# Patient Record
Sex: MALE | Race: WHITE | NOT HISPANIC OR LATINO | Employment: FULL TIME | URBAN - METROPOLITAN AREA
[De-identification: names, ages, dates, MRNs, and addresses within clinical notes are randomized per-mention and may not be internally consistent; named-entity substitution may affect disease eponyms.]

---

## 2021-06-16 PROBLEM — R10.9 ABDOMINAL PAIN: Status: ACTIVE | Noted: 2017-09-08

## 2024-01-03 ENCOUNTER — TRANSFERRED RECORDS (OUTPATIENT)
Dept: HEALTH INFORMATION MANAGEMENT | Facility: CLINIC | Age: 39
End: 2024-01-03

## 2024-09-23 ENCOUNTER — HOSPITAL ENCOUNTER (EMERGENCY)
Facility: CLINIC | Age: 39
Discharge: HOME | End: 2024-09-24
Attending: EMERGENCY MEDICINE | Admitting: EMERGENCY MEDICINE

## 2024-09-23 DIAGNOSIS — R11.2 NAUSEA AND VOMITING, UNSPECIFIED VOMITING TYPE: ICD-10-CM

## 2024-09-23 DIAGNOSIS — Z87.19 HISTORY OF CROHN'S DISEASE: ICD-10-CM

## 2024-09-23 DIAGNOSIS — R10.9 ABDOMINAL PAIN, UNSPECIFIED ABDOMINAL LOCATION: ICD-10-CM

## 2024-09-23 DIAGNOSIS — R45.851 PASSIVE SUICIDAL IDEATIONS: ICD-10-CM

## 2024-09-23 LAB
ALBUMIN SERPL BCG-MCNC: 4.2 G/DL (ref 3.5–5.2)
ALP SERPL-CCNC: 83 U/L (ref 40–150)
ALT SERPL W P-5'-P-CCNC: 11 U/L (ref 0–70)
ANION GAP SERPL CALCULATED.3IONS-SCNC: 15 MMOL/L (ref 7–15)
AST SERPL W P-5'-P-CCNC: 18 U/L (ref 0–45)
BASOPHILS # BLD AUTO: 0 10E3/UL (ref 0–0.2)
BASOPHILS NFR BLD AUTO: 0 %
BILIRUB SERPL-MCNC: 0.7 MG/DL
BUN SERPL-MCNC: 14.2 MG/DL (ref 6–20)
CALCIUM SERPL-MCNC: 9.3 MG/DL (ref 8.8–10.4)
CHLORIDE SERPL-SCNC: 104 MMOL/L (ref 98–107)
CREAT SERPL-MCNC: 1 MG/DL (ref 0.67–1.17)
EGFRCR SERPLBLD CKD-EPI 2021: >90 ML/MIN/1.73M2
EOSINOPHIL # BLD AUTO: 0 10E3/UL (ref 0–0.7)
EOSINOPHIL NFR BLD AUTO: 0 %
ERYTHROCYTE [DISTWIDTH] IN BLOOD BY AUTOMATED COUNT: 18.4 % (ref 10–15)
GLUCOSE SERPL-MCNC: 126 MG/DL (ref 70–99)
HCO3 SERPL-SCNC: 21 MMOL/L (ref 22–29)
HCT VFR BLD AUTO: 34.3 % (ref 40–53)
HGB BLD-MCNC: 10.3 G/DL (ref 13.3–17.7)
HOLD SPECIMEN: NORMAL
HOLD SPECIMEN: NORMAL
IMM GRANULOCYTES # BLD: 0 10E3/UL
IMM GRANULOCYTES NFR BLD: 0 %
LACTATE SERPL-SCNC: 1.3 MMOL/L (ref 0.7–2)
LYMPHOCYTES # BLD AUTO: 0.3 10E3/UL (ref 0.8–5.3)
LYMPHOCYTES NFR BLD AUTO: 2 %
MCH RBC QN AUTO: 21.8 PG (ref 26.5–33)
MCHC RBC AUTO-ENTMCNC: 30 G/DL (ref 31.5–36.5)
MCV RBC AUTO: 73 FL (ref 78–100)
MONOCYTES # BLD AUTO: 0.7 10E3/UL (ref 0–1.3)
MONOCYTES NFR BLD AUTO: 5 %
NEUTROPHILS # BLD AUTO: 12.1 10E3/UL (ref 1.6–8.3)
NEUTROPHILS NFR BLD AUTO: 92 %
NRBC # BLD AUTO: 0 10E3/UL
NRBC BLD AUTO-RTO: 0 /100
PLATELET # BLD AUTO: 476 10E3/UL (ref 150–450)
POTASSIUM SERPL-SCNC: 3.9 MMOL/L (ref 3.4–5.3)
PROT SERPL-MCNC: 7.5 G/DL (ref 6.4–8.3)
RBC # BLD AUTO: 4.72 10E6/UL (ref 4.4–5.9)
SODIUM SERPL-SCNC: 140 MMOL/L (ref 135–145)
WBC # BLD AUTO: 13.1 10E3/UL (ref 4–11)

## 2024-09-23 PROCEDURE — 85025 COMPLETE CBC W/AUTO DIFF WBC: CPT | Performed by: EMERGENCY MEDICINE

## 2024-09-23 PROCEDURE — 96361 HYDRATE IV INFUSION ADD-ON: CPT

## 2024-09-23 PROCEDURE — 83605 ASSAY OF LACTIC ACID: CPT | Performed by: STUDENT IN AN ORGANIZED HEALTH CARE EDUCATION/TRAINING PROGRAM

## 2024-09-23 PROCEDURE — 36415 COLL VENOUS BLD VENIPUNCTURE: CPT | Performed by: STUDENT IN AN ORGANIZED HEALTH CARE EDUCATION/TRAINING PROGRAM

## 2024-09-23 PROCEDURE — 36415 COLL VENOUS BLD VENIPUNCTURE: CPT | Performed by: EMERGENCY MEDICINE

## 2024-09-23 PROCEDURE — 84460 ALANINE AMINO (ALT) (SGPT): CPT | Performed by: EMERGENCY MEDICINE

## 2024-09-23 PROCEDURE — 99285 EMERGENCY DEPT VISIT HI MDM: CPT | Mod: 25

## 2024-09-23 PROCEDURE — 84155 ASSAY OF PROTEIN SERUM: CPT | Performed by: STUDENT IN AN ORGANIZED HEALTH CARE EDUCATION/TRAINING PROGRAM

## 2024-09-23 PROCEDURE — 83605 ASSAY OF LACTIC ACID: CPT | Performed by: EMERGENCY MEDICINE

## 2024-09-23 PROCEDURE — 250N000011 HC RX IP 250 OP 636: Performed by: STUDENT IN AN ORGANIZED HEALTH CARE EDUCATION/TRAINING PROGRAM

## 2024-09-23 PROCEDURE — 96374 THER/PROPH/DIAG INJ IV PUSH: CPT | Mod: 59

## 2024-09-23 PROCEDURE — 85025 COMPLETE CBC W/AUTO DIFF WBC: CPT | Performed by: STUDENT IN AN ORGANIZED HEALTH CARE EDUCATION/TRAINING PROGRAM

## 2024-09-23 RX ORDER — ONDANSETRON 4 MG/1
4 TABLET, ORALLY DISINTEGRATING ORAL ONCE
Status: COMPLETED | OUTPATIENT
Start: 2024-09-23 | End: 2024-09-23

## 2024-09-23 RX ADMIN — ONDANSETRON 4 MG: 4 TABLET, ORALLY DISINTEGRATING ORAL at 21:27

## 2024-09-23 ASSESSMENT — COLUMBIA-SUICIDE SEVERITY RATING SCALE - C-SSRS
2. HAVE YOU ACTUALLY HAD ANY THOUGHTS OF KILLING YOURSELF IN THE PAST MONTH?: YES
3. HAVE YOU BEEN THINKING ABOUT HOW YOU MIGHT KILL YOURSELF?: NO
5. HAVE YOU STARTED TO WORK OUT OR WORKED OUT THE DETAILS OF HOW TO KILL YOURSELF? DO YOU INTEND TO CARRY OUT THIS PLAN?: NO
4. HAVE YOU HAD THESE THOUGHTS AND HAD SOME INTENTION OF ACTING ON THEM?: YES
6. HAVE YOU EVER DONE ANYTHING, STARTED TO DO ANYTHING, OR PREPARED TO DO ANYTHING TO END YOUR LIFE?: YES
1. IN THE PAST MONTH, HAVE YOU WISHED YOU WERE DEAD OR WISHED YOU COULD GO TO SLEEP AND NOT WAKE UP?: YES

## 2024-09-24 ENCOUNTER — APPOINTMENT (OUTPATIENT)
Dept: CT IMAGING | Facility: CLINIC | Age: 39
End: 2024-09-24
Attending: EMERGENCY MEDICINE

## 2024-09-24 VITALS
BODY MASS INDEX: 19.62 KG/M2 | OXYGEN SATURATION: 99 % | RESPIRATION RATE: 20 BRPM | HEIGHT: 67 IN | SYSTOLIC BLOOD PRESSURE: 94 MMHG | HEART RATE: 55 BPM | TEMPERATURE: 98.8 F | WEIGHT: 125 LBS | DIASTOLIC BLOOD PRESSURE: 59 MMHG

## 2024-09-24 LAB
ALBUMIN SERPL BCG-MCNC: 4.3 G/DL (ref 3.5–5.2)
ALP SERPL-CCNC: 83 U/L (ref 40–150)
ALT SERPL W P-5'-P-CCNC: 10 U/L (ref 0–70)
ANION GAP SERPL CALCULATED.3IONS-SCNC: 15 MMOL/L (ref 7–15)
AST SERPL W P-5'-P-CCNC: 19 U/L (ref 0–45)
BASOPHILS # BLD AUTO: 0 10E3/UL (ref 0–0.2)
BASOPHILS NFR BLD AUTO: 0 %
BILIRUB SERPL-MCNC: 0.7 MG/DL
BUN SERPL-MCNC: 15.1 MG/DL (ref 6–20)
CALCIUM SERPL-MCNC: 9.4 MG/DL (ref 8.8–10.4)
CHLORIDE SERPL-SCNC: 104 MMOL/L (ref 98–107)
CREAT SERPL-MCNC: 1.05 MG/DL (ref 0.67–1.17)
EGFRCR SERPLBLD CKD-EPI 2021: >90 ML/MIN/1.73M2
EOSINOPHIL # BLD AUTO: 0 10E3/UL (ref 0–0.7)
EOSINOPHIL NFR BLD AUTO: 0 %
ERYTHROCYTE [DISTWIDTH] IN BLOOD BY AUTOMATED COUNT: 18.9 % (ref 10–15)
GLUCOSE SERPL-MCNC: 109 MG/DL (ref 70–99)
HCO3 SERPL-SCNC: 23 MMOL/L (ref 22–29)
HCT VFR BLD AUTO: 33.9 % (ref 40–53)
HGB BLD-MCNC: 10.3 G/DL (ref 13.3–17.7)
HOLD SPECIMEN: NORMAL
IMM GRANULOCYTES # BLD: 0.1 10E3/UL
IMM GRANULOCYTES NFR BLD: 1 %
LYMPHOCYTES # BLD AUTO: 0.4 10E3/UL (ref 0.8–5.3)
LYMPHOCYTES NFR BLD AUTO: 3 %
MCH RBC QN AUTO: 22 PG (ref 26.5–33)
MCHC RBC AUTO-ENTMCNC: 30.4 G/DL (ref 31.5–36.5)
MCV RBC AUTO: 72 FL (ref 78–100)
MONOCYTES # BLD AUTO: 0.7 10E3/UL (ref 0–1.3)
MONOCYTES NFR BLD AUTO: 5 %
NEUTROPHILS # BLD AUTO: 12.9 10E3/UL (ref 1.6–8.3)
NEUTROPHILS NFR BLD AUTO: 91 %
NRBC # BLD AUTO: 0 10E3/UL
NRBC BLD AUTO-RTO: 0 /100
PLATELET # BLD AUTO: 462 10E3/UL (ref 150–450)
POTASSIUM SERPL-SCNC: 3.7 MMOL/L (ref 3.4–5.3)
PROT SERPL-MCNC: 7.6 G/DL (ref 6.4–8.3)
RBC # BLD AUTO: 4.68 10E6/UL (ref 4.4–5.9)
SODIUM SERPL-SCNC: 142 MMOL/L (ref 135–145)
WBC # BLD AUTO: 14.1 10E3/UL (ref 4–11)

## 2024-09-24 PROCEDURE — 250N000011 HC RX IP 250 OP 636: Performed by: EMERGENCY MEDICINE

## 2024-09-24 PROCEDURE — 74177 CT ABD & PELVIS W/CONTRAST: CPT

## 2024-09-24 PROCEDURE — 258N000003 HC RX IP 258 OP 636: Performed by: EMERGENCY MEDICINE

## 2024-09-24 PROCEDURE — 250N000009 HC RX 250: Performed by: EMERGENCY MEDICINE

## 2024-09-24 RX ORDER — HYDROMORPHONE HYDROCHLORIDE 1 MG/ML
0.5 INJECTION, SOLUTION INTRAMUSCULAR; INTRAVENOUS; SUBCUTANEOUS ONCE
Status: COMPLETED | OUTPATIENT
Start: 2024-09-24 | End: 2024-09-24

## 2024-09-24 RX ORDER — IOPAMIDOL 755 MG/ML
500 INJECTION, SOLUTION INTRAVASCULAR ONCE
Status: COMPLETED | OUTPATIENT
Start: 2024-09-24 | End: 2024-09-24

## 2024-09-24 RX ORDER — ONDANSETRON 4 MG/1
4 TABLET, ORALLY DISINTEGRATING ORAL EVERY 8 HOURS PRN
Qty: 4 TABLET | Refills: 0 | Status: SHIPPED | OUTPATIENT
Start: 2024-09-24

## 2024-09-24 RX ORDER — ONDANSETRON 2 MG/ML
4 INJECTION INTRAMUSCULAR; INTRAVENOUS ONCE
Status: COMPLETED | OUTPATIENT
Start: 2024-09-24 | End: 2024-09-24

## 2024-09-24 RX ADMIN — ONDANSETRON 4 MG: 2 INJECTION INTRAMUSCULAR; INTRAVENOUS at 02:08

## 2024-09-24 RX ADMIN — SODIUM CHLORIDE 56 ML: 9 INJECTION, SOLUTION INTRAVENOUS at 01:11

## 2024-09-24 RX ADMIN — HYDROMORPHONE HYDROCHLORIDE 0.5 MG: 1 INJECTION, SOLUTION INTRAMUSCULAR; INTRAVENOUS; SUBCUTANEOUS at 02:09

## 2024-09-24 RX ADMIN — SODIUM CHLORIDE 1000 ML: 9 INJECTION, SOLUTION INTRAVENOUS at 02:08

## 2024-09-24 RX ADMIN — IOPAMIDOL 63 ML: 755 INJECTION, SOLUTION INTRAVENOUS at 01:11

## 2024-09-24 ASSESSMENT — ACTIVITIES OF DAILY LIVING (ADL)
ADLS_ACUITY_SCORE: 35

## 2024-09-24 NOTE — DISCHARGE INSTRUCTIONS
Please monitor symptoms closely.  Continue with a bland diet.    Continue to follow-up with gastroenterology as you have discussed.    You may use Zofran as needed for nausea or vomiting.    Follow-up with your outpatient therapy and psychiatry appointments.    Return to the ER if you develop worsening nausea, vomiting, decreased oral intake, or develop any concerns regarding your own safety.    Discharge Instructions  Abdominal Pain    Abdominal pain (belly pain) can be caused by many things. Your evaluation today does not show the exact cause for your pain. Your provider today has decided that it is unlikely your pain is due to a life threatening problem, or a problem requiring surgery or hospital admission. Sometimes those problems cannot be found right away, so it is very important that you follow up as directed.  Sometimes only the changes which occur over time allow the cause of your pain to be found.    Generally, every Emergency Department visit should have a follow-up clinic visit with either a primary or a specialty clinic/provider. Please follow-up as instructed by your emergency provider today. With abdominal pain, we often recommend very close follow-up, such as the following day.    ADULTS:  Return to the Emergency Department right away if:    You get an oral temperature above 102oF or as directed by your provider.  You have blood in your stools. This may be bright red or appear as black, tarry stools.    You keep vomiting (throwing up) or cannot drink liquids.  You see blood when you vomit.   You cannot have a bowel movement or you cannot pass gas.  Your stomach gets bloated or bigger.  Your skin or the whites of your eyes look yellow.  You faint.  You have bloody, frequent or painful urination (peeing).  You have new symptoms or anything that worries you.    CHILDREN:  Return to the Emergency Department right away if your child has any of the above-listed symptoms or the following:    Pushes your  hand away or screams/cries when his/her belly is touched.  You notice your child is very fussy or weak.  Your child is very tired and is too tired to eat or drink.  Your child is dehydrated.  Signs of dehydration can be:  Significant change in the amount of wet diapers/urine.  Your infant or child starts to have dry mouth and lips, or no saliva (spit) or tears.    PREGNANT WOMEN:  Return to the Emergency Department right away if you have any of the above-listed symptoms or the following:    You have bleeding, leaking fluid or passing tissue from the vagina.  You have worse pain or cramping, or pain in your shoulder or back.  You have vomiting that will not stop.  You have a temperature of 100oF or more.  Your baby is not moving as much as usual.  You faint.  You get a bad headache with or without eye problems and abdominal pain.  You have a seizure.  You have unusual discharge from your vagina and abdominal pain.    Abdominal pain is pretty common during pregnancy.  Your pain may or may not be related to your pregnancy. You should follow-up closely with your OB provider so they can evaluate you and your baby.  Until you follow-up with your regular provider, do the following:     Avoid sex and do not put anything in your vagina.  Drink clear fluids.  Only take medications approved by your provider.    MORE INFORMATION:    Appendicitis:  A possible cause of abdominal pain in any person who still has their appendix is acute appendicitis. Appendicitis is often hard to diagnose.  Testing does not always rule out early appendicitis or other causes of abdominal pain. Close follow-up with your provider and re-evaluations may be needed to figure out the reason for your abdominal pain.    Follow-up:  It is very important that you make an appointment with your clinic and go to the appointment.  If you do not follow-up with your primary provider, it may result in missing an important development which could result in permanent  "injury or disability and/or lasting pain.  If there is any problem keeping your appointment, call your provider or return to the Emergency Department.    Medications:  Take your medications as directed by your provider today.  Before using over-the-counter medications, ask your provider and make sure to take the medications as directed.  If you have any questions about medications, ask your provider.    Diet:  Resume your normal diet as much as possible, but do not eat fried, fatty or spicy foods while you have pain.  Do not drink alcohol or have caffeine.  Do not smoke tobacco.    Probiotics: If you have been given an antibiotic, you may want to also take a probiotic pill or eat yogurt with live cultures. Probiotics have \"good bacteria\" to help your intestines stay healthy. Studies have shown that probiotics help prevent diarrhea (loose stools) and other intestine problems (including C. diff infection) when you take antibiotics. You can buy these without a prescription in the pharmacy section of the store.     If you were given a prescription for medicine here today, be sure to read all of the information (including the package insert) that comes with your prescription.  This will include important information about the medicine, its side effects, and any warnings that you need to know about.  The pharmacist who fills the prescription can provide more information and answer questions you may have about the medicine.  If you have questions or concerns that the pharmacist cannot address, please call or return to the Emergency Department.       Remember that you can always come back to the Emergency Department if you are not able to see your regular provider in the amount of time listed above, if you get any new symptoms, or if there is anything that worries you.  Discharge Instructions  Mental Health Concerns    You were seen today for mental health concerns, such as depression, anxiety, or suicidal thinking. Your " provider feels that you do not require hospitalization at this time. However, your symptoms may become worse, and you may need to return to the Emergency Department. Most treatments of depression and suicidal thoughts are a process rather than a single intervention.  Medications and counseling can take several weeks or more to help.    Generally, every Emergency Department visit should have a follow-up clinic visit with either a primary or a specialty clinic/provider. Please follow-up as instructed by your emergency provider today.    By accepting these discharge instructions:  You promise to not harm yourself or others.  You agree that if you feel you are becoming unable to keep that promise, you will do something to help yourself before you do anything to harm yourself or others.   You agree to keep any safety plan arranged on your visit here today.  You agree to take any medication prescribed or recommended by your provider.  If you are getting worse, you can contact a friend or a family member, contact your counselor or family provider, contact a crisis line, or other options discussed with the provider or therapist today.  At any time, you can call 911 and return to the Emergency Department for more help.  You understand that follow-up is essential to your treatment, and you will make and keep appointments recommended on your visit today.    How to improve your mental health and prevent suicide:  Involve others by letting family, friends, counselors know.  Do not isolate yourself.  Avoid alcohol or drugs. Remove weapons, poisons from your home.  Try to stick to routines for eating, sleeping and getting regular exercise.    Try to get into sunlight. Bright natural light not only treats seasonal affective disorder but also depression.  Increase safe activities that you enjoy.    If you feel worse, contact [phone] ([phone]), or call 911, or your primary provider/counselor for additional assistance.     If you were given a prescription for medicine here today, be sure to read all of the information (including the package insert) that comes with your prescription.  This will include important information about the medicine, its side effects, and any warnings that you need to know about.  The pharmacist who fills the prescription can provide more information and answer questions you may have about the medicine.  If you have questions or concerns that the pharmacist cannot address, please call or return to the Emergency Department.   Remember that you can always come back to the Emergency Department if you are not able to see your regular provider in the amount of time listed above, if you get any new symptoms, or if there is anything that worries you.

## 2024-09-24 NOTE — ED PROVIDER NOTES
"  Emergency Department Note      History of Present Illness     Chief Complaint  Abdominal Pain    HPI  Joseph R Barthel is a 38 year old male with history of Crohn's disease who presents to the ED with his mother for evaluation of abdominal pain. He reports walking up this morning at 0525 with severe middle abdominal pain and cramps along with multiple episodes of vomiting.  He attempted to go to work, though given the persistence of his symptoms, was ultimately sent home.  Upon arriving home, he continued to experience nausea and vomiting, including an episode about every 20 minutes.  The symptoms make him feel quite dehydrated, weak, with fatigue, lack of appetite, and myalgias throughout his body.  He notes that during this time, he had been feeling of helplessness, noting thoughts of \"I do not want to do this anymore.\"  He acknowledges the thoughts were out of frustration that he could not adequately control his symptoms and was not sure what to do next.  Patient does not knowledge multiple episodes of flares similar to this in the years past, though it has been over 1 year since he has had a bout.  He denies any clear provoking factors including known sick contacts or recent antibiotic use.  Of note, patient had previously been residing in [place], before recently returning to [place] where he has been living lately.  He is in the process of establishing care with [facility] for his Crohn's disease.  Past surgical history significant for colectomy.  Current pain is rated at 6/10 on the pain scale.  He denies any associated diarrhea, nor hematemesis.  He has not had significant bowel movements throughout the day, and also acknowledges not eating much throughout the day.      Independent Historian  None    Review of External Notes  I reviewed a psychiatry visit note from 9/12/2024, where patient is followed for medication management.    I reviewed family medicine visit note from 9/12/2024, where I " "learned patient is on Truvada for high risk sexual behavior.    I reviewed a telemedicine psychiatry visit note from 9/5/2024, where patient does have a history of anxiety and panic attacks, oftentimes getting worked up because he continues to experience nightly vomiting.  Past Medical History   Medical History and Problem List  Crohn's disease   Panic attacks  Chronic anemia   Ulcerative colitis    Medications  Sertraline  Clonazepam  Zofran   Trazodone  Truvada  Minoxidil    Surgical History   Colectomy  Small intestine surgery  Anal fistulotomy   Physical Exam   Patient Vitals for the past 24 hrs:   BP Temp Temp src Pulse Resp SpO2 Height Weight   09/24/24 0259 94/59 -- -- 55 -- 99 % -- --   09/24/24 0230 95/61 -- -- 52 -- 99 % -- --   09/24/24 0200 95/58 -- -- 53 -- 99 % -- --   09/24/24 0150 99/63 -- -- -- -- 98 % -- --   09/24/24 0126 91/57 -- -- -- -- 90 % -- --   09/24/24 0106 -- -- -- -- -- 98 % -- --   09/24/24 0056 93/61 -- -- -- -- 97 % -- --   09/24/24 0002 102/63 -- -- 60 20 97 % -- --   09/23/24 2122 119/76 98.8  F (37.1  C) Oral 91 24 98 % 1.702 m (5' 7\") 56.7 kg (125 lb)     Physical Exam  General:              Well-nourished              Speaking in full sentences  Eyes:              Conjunctiva without injection or scleral icterus  ENT:              Moist mucous membranes              Nares patent              Pinnae normal  Neck:              Full ROM              No stiffness appreciated  Resp:              Lungs CTAB              No crackles, wheezing or audible rubs              Good air movement  CV:                    Normal rate, regular rhythm              S1 and S2 present              No murmur, gallop or rub  GI:              BS present              Abdomen soft without distention              Diffuse tenderness throughout              No palpable mass              No guarding or rebound tenderness  Skin:              Warm, dry, well perfused              No rashes or open wounds on " exposed skin  MSK:              Moves all extremities              No focal deformities or swelling  Neuro:              Alert              Answers questions appropriately              Moves all extremities equally              Gait stable  Psych:              Normal affect, normal mood              Denies suicidal plan    Diagnostics   Lab Results   Labs Ordered and Resulted from Time of ED Arrival to Time of ED Departure   COMPREHENSIVE METABOLIC PANEL - Abnormal       Result Value    Sodium 140      Potassium 3.9      Carbon Dioxide (CO2) 21 (*)     Anion Gap 15      Urea Nitrogen 14.2      Creatinine 1.00      GFR Estimate >90      Calcium 9.3      Chloride 104      Glucose 126 (*)     Alkaline Phosphatase 83      AST 18      ALT 11      Protein Total 7.5      Albumin 4.2      Bilirubin Total 0.7     CBC WITH PLATELETS AND DIFFERENTIAL - Abnormal    WBC Count 13.1 (*)     RBC Count 4.72      Hemoglobin 10.3 (*)     Hematocrit 34.3 (*)     MCV 73 (*)     MCH 21.8 (*)     MCHC 30.0 (*)     RDW 18.4 (*)     Platelet Count 476 (*)     % Neutrophils 92      % Lymphocytes 2      % Monocytes 5      % Eosinophils 0      % Basophils 0      % Immature Granulocytes 0      NRBCs per 100 WBC 0      Absolute Neutrophils 12.1 (*)     Absolute Lymphocytes 0.3 (*)     Absolute Monocytes 0.7      Absolute Eosinophils 0.0      Absolute Basophils 0.0      Absolute Immature Granulocytes 0.0      Absolute NRBCs 0.0     COMPREHENSIVE METABOLIC PANEL - Abnormal    Sodium 142      Potassium 3.7      Carbon Dioxide (CO2) 23      Anion Gap 15      Urea Nitrogen 15.1      Creatinine 1.05      GFR Estimate >90      Calcium 9.4      Chloride 104      Glucose 109 (*)     Alkaline Phosphatase 83      AST 19      ALT 10      Protein Total 7.6      Albumin 4.3      Bilirubin Total 0.7     CBC WITH PLATELETS AND DIFFERENTIAL - Abnormal    WBC Count 14.1 (*)     RBC Count 4.68      Hemoglobin 10.3 (*)     Hematocrit 33.9 (*)     MCV 72 (*)     MCH  22.0 (*)     MCHC 30.4 (*)     RDW 18.9 (*)     Platelet Count 462 (*)     % Neutrophils 91      % Lymphocytes 3      % Monocytes 5      % Eosinophils 0      % Basophils 0      % Immature Granulocytes 1      NRBCs per 100 WBC 0      Absolute Neutrophils 12.9 (*)     Absolute Lymphocytes 0.4 (*)     Absolute Monocytes 0.7      Absolute Eosinophils 0.0      Absolute Basophils 0.0      Absolute Immature Granulocytes 0.1      Absolute NRBCs 0.0     LACTIC ACID WHOLE BLOOD - Normal    Lactic Acid 1.3       Imaging  CT Abdomen Pelvis w Contrast   Final Result   IMPRESSION:    1.  No acute findings or inflammatory changes in the abdomen or pelvis.        Report per radiology    Independent Interpretation  None  ED Course    Medications Administered  Medications   ondansetron (ZOFRAN ODT) ODT tab 4 mg (4 mg Oral $Given 9/23/24 2127)   sodium chloride 0.9% BOLUS 1,000 mL (0 mLs Intravenous Stopped 9/24/24 0329)   HYDROmorphone (PF) (DILAUDID) injection 0.5 mg (0.5 mg Intravenous $Given 9/24/24 0209)   ondansetron (ZOFRAN) injection 4 mg (4 mg Intravenous $Given 9/24/24 0208)   iopamidol (ISOVUE-370) solution 500 mL (63 mLs Intravenous $Given 9/24/24 0111)   CT scan flush (56 mLs Intravenous $Given 9/24/24 0111)     Procedures  Procedures     Discussion of Management  None    Optional/Additional Documentation  None    ED Course  ED Course as of 09/24/24 0330   Mon Sep 23, 2024   2355 I obtained history and examined the patient as noted above.    e Sep 24, 2024   0314 I rechecked the patient and explained findings. Patient is no longer suicidal. We discussed being evaluated by DEC in the later morning but patient declined. He reports being well established and sees therapy every Friday. He can be safe at home.    0329 I prepared the patient to be discharged home.      Medical Decision Making / Diagnosis   CMS Diagnoses: None    MIPS     None    [facility]  Joseph Barthel is a 38-year-old male presenting to the ER for evaluation of  "abdominal pain, nausea, and vomiting.  VS on presentation reveal no acute abnormalities.  History obtained from patient as well as review of EMR.  Patient presenting with recurrent nausea and nonbloody emesis.  He was provided IV fluids and symptomatic cares, noting improvements in symptoms.  He was without ongoing vomiting during his ED course.  He does acknowledge a prior history of flares similar to this in the past, though it has been sometime.  History of Crohn's disease, as well as significant vomiting, advanced imaging was pursued.  Fortunately, no evidence of bowel obstruction or other acute intra-abdominal pathology is identified.  His laboratory evaluation does reveal unremarkable metabolic panel including LFTs, as well as mild leukocytosis and thrombocytosis which I suspect to be secondary to recurrent nausea and vomiting.  He does have mild anemia, though has had this in the past and is improved compared with most recent value available for comparison from 2017.  By history, no signs or symptoms of acute blood loss anemia.    From a mental health standpoint, patient did screen positive during the triage suicide screen questionnaire.  When questioned regarding suicidal ideation, patient responded by saying \"yes, I just wanted to stop\", adding that he is \"50/50\" referring to suicidal ideation.  In further discussion with the patient, he denies any active plan or true intent.  After his symptoms were controlled following the above interventions, he is feeling markedly improved and no longer having any thoughts of suicide.  He acknowledges that the stress of his recurrent nausea and vomiting ultimately led to increased levels of anxiety and likely a panic attack, contributing to these feelings.  I did offer for the patient to speak with DEC during his ED course.  Unfortunately, they were quite backed up and would not be available to meet with the patient till 8/10 in the morning.  On reassessment, patient is " wishing to return home and defer on the crisis team assessment.  He notes that he is well-established in the outpatient environment, seeing a therapist every Friday, as well as regular follow-up with a psychiatrist for medication management.  With reasonable clinical certainty I feel this to be a reasonable plan of care.  He does not appear to be an imminent threat to his own safety or the safety of others in the outpatient environment.  He is understanding that he may return to the ER in the meantime with any new or troubling symptoms such as recurrent nausea, vomiting, bloody stool, fevers, increasing abdominal pain or any other concerns.  All of his questions have been answered prior to discharge.  Mother at bedside and in agreement with plan of care.    Disposition  The patient was discharged.     ICD-10 Codes:    ICD-10-CM    1. Abdominal pain, unspecified abdominal location  R10.9       2. History of Crohn's disease  Z87.19       3. Nausea and vomiting, unspecified vomiting type  R11.2       4. Passive suicidal ideations  R45.851            Discharge Medications  New Prescriptions    ONDANSETRON (ZOFRAN ODT) 4 MG ODT TAB    Take 1 tablet (4 mg) by mouth every 8 hours as needed.     Scribe Disclosure:  I, Anna Pedro, am serving as a scribe at 12:11 AM on 9/24/2024 to document services personally performed by Maurilio Bolivar MD based on my observations and the provider's statements to me.      Maurilio Bolivar MD  09/24/24 4216

## 2024-09-24 NOTE — ED TRIAGE NOTES
"Patient arrives complaining of abdominal pain and vomiting. Pt states after the pain the panic attacks all day have been become worse. History of chrohn's and panic attacks. Pt is anxious and tearful in triage. When asked suicidal questions \"yes, I just want it to stop.\" When asked about suicide he said, \"50/50.\" \"I'm tired, thirsty and in pain.\"        "

## 2024-10-30 ENCOUNTER — OFFICE VISIT (OUTPATIENT)
Dept: URGENT CARE | Facility: URGENT CARE | Age: 39
End: 2024-10-30
Payer: COMMERCIAL

## 2024-10-30 ENCOUNTER — HOSPITAL ENCOUNTER (EMERGENCY)
Facility: CLINIC | Age: 39
Discharge: HOME | End: 2024-10-30
Attending: EMERGENCY MEDICINE | Admitting: EMERGENCY MEDICINE
Payer: COMMERCIAL

## 2024-10-30 VITALS
SYSTOLIC BLOOD PRESSURE: 121 MMHG | OXYGEN SATURATION: 95 % | HEIGHT: 66 IN | HEART RATE: 63 BPM | RESPIRATION RATE: 12 BRPM | BODY MASS INDEX: 18.81 KG/M2 | DIASTOLIC BLOOD PRESSURE: 72 MMHG | WEIGHT: 117.06 LBS | TEMPERATURE: 98.5 F

## 2024-10-30 VITALS
RESPIRATION RATE: 20 BRPM | OXYGEN SATURATION: 99 % | HEART RATE: 112 BPM | SYSTOLIC BLOOD PRESSURE: 118 MMHG | BODY MASS INDEX: 18.62 KG/M2 | DIASTOLIC BLOOD PRESSURE: 64 MMHG | HEIGHT: 67 IN | WEIGHT: 118.6 LBS | TEMPERATURE: 98.5 F

## 2024-10-30 DIAGNOSIS — K50.919 CROHN'S DISEASE WITH COMPLICATION, UNSPECIFIED GASTROINTESTINAL TRACT LOCATION (H): ICD-10-CM

## 2024-10-30 DIAGNOSIS — R11.2 NAUSEA AND VOMITING, UNSPECIFIED VOMITING TYPE: ICD-10-CM

## 2024-10-30 DIAGNOSIS — K50.90 ACUTE CROHN'S DISEASE WITHOUT COMPLICATION (H): ICD-10-CM

## 2024-10-30 DIAGNOSIS — F41.9 ANXIETY: ICD-10-CM

## 2024-10-30 DIAGNOSIS — R10.84 GENERALIZED ABDOMINAL PAIN: ICD-10-CM

## 2024-10-30 DIAGNOSIS — R11.10 ACUTE VOMITING: Primary | ICD-10-CM

## 2024-10-30 PROBLEM — F41.1 GENERALIZED ANXIETY DISORDER: Status: ACTIVE | Noted: 2023-11-02

## 2024-10-30 PROBLEM — D64.9 ANEMIA: Status: ACTIVE | Noted: 2017-08-25

## 2024-10-30 PROBLEM — K51.90 ULCERATIVE COLITIS (H): Status: ACTIVE | Noted: 2017-03-19

## 2024-10-30 PROBLEM — K52.9 INFLAMMATORY BOWEL DISEASE: Status: ACTIVE | Noted: 2023-09-07

## 2024-10-30 LAB
ALBUMIN SERPL BCG-MCNC: 4.2 G/DL (ref 3.5–5.2)
ALP SERPL-CCNC: 81 U/L (ref 40–150)
ALT SERPL W P-5'-P-CCNC: 13 U/L (ref 0–70)
ANION GAP SERPL CALCULATED.3IONS-SCNC: 12 MMOL/L (ref 7–15)
AST SERPL W P-5'-P-CCNC: 18 U/L (ref 0–45)
BASOPHILS # BLD AUTO: 0 10E3/UL (ref 0–0.2)
BASOPHILS NFR BLD AUTO: 0 %
BILIRUB SERPL-MCNC: 0.8 MG/DL
BUN SERPL-MCNC: 10.6 MG/DL (ref 6–20)
CALCIUM SERPL-MCNC: 9.3 MG/DL (ref 8.8–10.4)
CHLORIDE SERPL-SCNC: 100 MMOL/L (ref 98–107)
CREAT SERPL-MCNC: 0.98 MG/DL (ref 0.67–1.17)
EGFRCR SERPLBLD CKD-EPI 2021: >90 ML/MIN/1.73M2
EOSINOPHIL # BLD AUTO: 0.5 10E3/UL (ref 0–0.7)
EOSINOPHIL NFR BLD AUTO: 4 %
ERYTHROCYTE [DISTWIDTH] IN BLOOD BY AUTOMATED COUNT: 19.2 % (ref 10–15)
GLUCOSE SERPL-MCNC: 102 MG/DL (ref 70–99)
HCO3 SERPL-SCNC: 25 MMOL/L (ref 22–29)
HCT VFR BLD AUTO: 33.7 % (ref 40–53)
HGB BLD-MCNC: 10.2 G/DL (ref 13.3–17.7)
HOLD SPECIMEN: NORMAL
IMM GRANULOCYTES # BLD: 0.1 10E3/UL
IMM GRANULOCYTES NFR BLD: 1 %
LIPASE SERPL-CCNC: 23 U/L (ref 13–60)
LYMPHOCYTES # BLD AUTO: 0.4 10E3/UL (ref 0.8–5.3)
LYMPHOCYTES NFR BLD AUTO: 3 %
MCH RBC QN AUTO: 22.1 PG (ref 26.5–33)
MCHC RBC AUTO-ENTMCNC: 30.3 G/DL (ref 31.5–36.5)
MCV RBC AUTO: 73 FL (ref 78–100)
MONOCYTES # BLD AUTO: 0.7 10E3/UL (ref 0–1.3)
MONOCYTES NFR BLD AUTO: 6 %
NEUTROPHILS # BLD AUTO: 10.8 10E3/UL (ref 1.6–8.3)
NEUTROPHILS NFR BLD AUTO: 86 %
NRBC # BLD AUTO: 0 10E3/UL
NRBC BLD AUTO-RTO: 0 /100
PLATELET # BLD AUTO: 416 10E3/UL (ref 150–450)
POTASSIUM SERPL-SCNC: 4 MMOL/L (ref 3.4–5.3)
PROT SERPL-MCNC: 7.1 G/DL (ref 6.4–8.3)
RBC # BLD AUTO: 4.61 10E6/UL (ref 4.4–5.9)
SODIUM SERPL-SCNC: 137 MMOL/L (ref 135–145)
WBC # BLD AUTO: 12.5 10E3/UL (ref 4–11)

## 2024-10-30 PROCEDURE — 36415 COLL VENOUS BLD VENIPUNCTURE: CPT | Performed by: EMERGENCY MEDICINE

## 2024-10-30 PROCEDURE — 96375 TX/PRO/DX INJ NEW DRUG ADDON: CPT

## 2024-10-30 PROCEDURE — 99203 OFFICE O/P NEW LOW 30 MIN: CPT | Performed by: PHYSICIAN ASSISTANT

## 2024-10-30 PROCEDURE — 250N000011 HC RX IP 250 OP 636: Performed by: EMERGENCY MEDICINE

## 2024-10-30 PROCEDURE — 96374 THER/PROPH/DIAG INJ IV PUSH: CPT

## 2024-10-30 PROCEDURE — 99284 EMERGENCY DEPT VISIT MOD MDM: CPT | Mod: 25

## 2024-10-30 PROCEDURE — 82947 ASSAY GLUCOSE BLOOD QUANT: CPT | Performed by: EMERGENCY MEDICINE

## 2024-10-30 PROCEDURE — 93005 ELECTROCARDIOGRAM TRACING: CPT

## 2024-10-30 PROCEDURE — 85025 COMPLETE CBC W/AUTO DIFF WBC: CPT | Performed by: EMERGENCY MEDICINE

## 2024-10-30 PROCEDURE — 83690 ASSAY OF LIPASE: CPT | Performed by: EMERGENCY MEDICINE

## 2024-10-30 RX ORDER — ONDANSETRON 4 MG/1
4 TABLET, ORALLY DISINTEGRATING ORAL EVERY 8 HOURS PRN
Qty: 12 TABLET | Refills: 0 | Status: SHIPPED | OUTPATIENT
Start: 2024-10-30 | End: 2024-11-08

## 2024-10-30 RX ORDER — KETOROLAC TROMETHAMINE 15 MG/ML
15 INJECTION, SOLUTION INTRAMUSCULAR; INTRAVENOUS ONCE
Status: COMPLETED | OUTPATIENT
Start: 2024-10-30 | End: 2024-10-30

## 2024-10-30 RX ORDER — DIPHENHYDRAMINE HYDROCHLORIDE 50 MG/ML
25 INJECTION INTRAMUSCULAR; INTRAVENOUS ONCE
Status: COMPLETED | OUTPATIENT
Start: 2024-10-30 | End: 2024-10-30

## 2024-10-30 RX ORDER — DROPERIDOL 2.5 MG/ML
0.62 INJECTION, SOLUTION INTRAMUSCULAR; INTRAVENOUS ONCE
Status: COMPLETED | OUTPATIENT
Start: 2024-10-30 | End: 2024-10-30

## 2024-10-30 RX ORDER — LORAZEPAM 2 MG/ML
1 INJECTION INTRAMUSCULAR ONCE
Status: COMPLETED | OUTPATIENT
Start: 2024-10-30 | End: 2024-10-30

## 2024-10-30 RX ORDER — KETOROLAC TROMETHAMINE 10 MG/1
10 TABLET, FILM COATED ORAL EVERY 6 HOURS PRN
Qty: 12 TABLET | Refills: 0 | Status: SHIPPED | OUTPATIENT
Start: 2024-10-30 | End: 2024-11-08

## 2024-10-30 RX ADMIN — LORAZEPAM 1 MG: 2 INJECTION INTRAMUSCULAR; INTRAVENOUS at 20:12

## 2024-10-30 RX ADMIN — DROPERIDOL 0.62 MG: 2.5 INJECTION, SOLUTION INTRAMUSCULAR; INTRAVENOUS at 19:09

## 2024-10-30 RX ADMIN — KETOROLAC TROMETHAMINE 15 MG: 15 INJECTION, SOLUTION INTRAMUSCULAR; INTRAVENOUS at 20:12

## 2024-10-30 RX ADMIN — DIPHENHYDRAMINE HYDROCHLORIDE 25 MG: 50 INJECTION, SOLUTION INTRAMUSCULAR; INTRAVENOUS at 19:07

## 2024-10-30 ASSESSMENT — ACTIVITIES OF DAILY LIVING (ADL)
ADLS_ACUITY_SCORE: 0

## 2024-10-30 ASSESSMENT — ENCOUNTER SYMPTOMS
BLOOD IN STOOL: 0
DIARRHEA: 0
FEVER: 0
COUGH: 0
VOMITING: 1
ABDOMINAL PAIN: 1
RHINORRHEA: 0
NAUSEA: 1
SORE THROAT: 0

## 2024-10-30 ASSESSMENT — COLUMBIA-SUICIDE SEVERITY RATING SCALE - C-SSRS
6. HAVE YOU EVER DONE ANYTHING, STARTED TO DO ANYTHING, OR PREPARED TO DO ANYTHING TO END YOUR LIFE?: NO
2. HAVE YOU ACTUALLY HAD ANY THOUGHTS OF KILLING YOURSELF IN THE PAST MONTH?: NO
1. IN THE PAST MONTH, HAVE YOU WISHED YOU WERE DEAD OR WISHED YOU COULD GO TO SLEEP AND NOT WAKE UP?: NO

## 2024-10-30 NOTE — ED TRIAGE NOTES
Pt arrives for nausea/vomiting and generalized abdominal cramping starting this afternoon. Hx crohns. Pt concerned about dehydration. He reports he ate fruit and salad at lunch that was too fibrous and upset his crohns. Pt does not have a colon. Took zofran at home around 1600 but threw up immediately. Pt appears very uncomfortable in triage.

## 2024-10-30 NOTE — PROGRESS NOTES
Assessment & Plan:        ICD-10-CM    1. Acute vomiting  R11.10       2. Crohn's disease with complication, unspecified gastrointestinal tract location (H)  K50.919             Plan/Clinical Decision Making:    Patient presents to clinic with vomiting and persistent heaving. Has hx of Crohn's. Patient has concerns about dehydration. Previous issues in past with acute vomiting episode. Has generalized abdominal pain. Not severe. Discussed being seen in ED for IV fluids, labs and further treatment.   Establishing with PCP soon and hoping to get connected with GI.      Patient understood and agreed to plan. Patient was stable to go directly to ED for treatment.         Arlette Porter PA-C on 10/30/2024 at 5:22 PM          Subjective:     HPI:    Truman is a 39 year old male who presents to clinic today for the following health issues:  Chief Complaint   Patient presents with    Urgent Care     Vomiting and abdominal cramping since 2 pm this afternoon.  He vomited a dozen times .  He has chrones and he is worried about inflammation and dehydration     HPI    Patient with history of Crohn's disease presents with persistent vomiting since 2pm.   Vomiting started after lunch today. No blood in vomit or stool. Having generalized abdominal pain. Afebrile. No diarrhea. He was worried about getting dehydrated.   Patient has upcoming appointment with new PCP on 11/8/24.   Took a Zofran at 2pm today and not improving symptoms.     Seen in ED on 9/24/24 for abdominal pain. Has has repeated issues in past.     Past surgical history of colectomy.       On Sunday morning had some sweats. Had some vomiting at that time. Had symptoms Sunday night into Monday. That resolved. Did take home covid test and negative.       Review of Systems   Constitutional:  Negative for fever.   HENT:  Negative for congestion, rhinorrhea and sore throat.    Respiratory:  Negative for cough.    Gastrointestinal:  Positive for abdominal pain  "(generalized), nausea and vomiting. Negative for blood in stool and diarrhea.         Patient Active Problem List   Diagnosis    Abdominal pain    Anemia    Generalized anxiety disorder    Inflammatory bowel disease    Ulcerative colitis (H)        No past medical history on file.    Social History     Tobacco Use    Smoking status: Not on file    Smokeless tobacco: Not on file   Substance Use Topics    Alcohol use: Not on file             Objective:     Vitals:    10/30/24 1710   BP: 118/64   Pulse: 112   Resp: 20   Temp: 98.5  F (36.9  C)   TempSrc: Tympanic   SpO2: 99%   Weight: 53.8 kg (118 lb 9.6 oz)   Height: 1.702 m (5' 7\")         Physical Exam   EXAM:   Pleasant, alert, appropriate appearance. Appears in mild distress with heaving and vomiting.   Head Exam: Normocephalic, atraumatic.  Eye Exam:   non icteric/injection.    Ext/musculoskeletal: Grossly intact,   Neuro: CN II-XII intact grossly intact.  Skin: no rash or lesion.      Results:  Results for orders placed or performed during the hospital encounter of 10/30/24   Comprehensive metabolic panel     Status: Abnormal   Result Value Ref Range    Sodium 137 135 - 145 mmol/L    Potassium 4.0 3.4 - 5.3 mmol/L    Carbon Dioxide (CO2) 25 22 - 29 mmol/L    Anion Gap 12 7 - 15 mmol/L    Urea Nitrogen 10.6 6.0 - 20.0 mg/dL    Creatinine 0.98 0.67 - 1.17 mg/dL    GFR Estimate >90 >60 mL/min/1.73m2    Calcium 9.3 8.8 - 10.4 mg/dL    Chloride 100 98 - 107 mmol/L    Glucose 102 (H) 70 - 99 mg/dL    Alkaline Phosphatase 81 40 - 150 U/L    AST 18 0 - 45 U/L    ALT 13 0 - 70 U/L    Protein Total 7.1 6.4 - 8.3 g/dL    Albumin 4.2 3.5 - 5.2 g/dL    Bilirubin Total 0.8 <=1.2 mg/dL   [place] Draw     Status: None    Narrative    The following orders were created for panel order [place] Draw.  Procedure                               Abnormality         Status                     ---------                               -----------         ------                     Extra " Blue Top Tube[[id number]]                              Final result               Extra Red Top Tube[[id number]]                                                            Please view results for these tests on the individual orders.   CBC with platelets and differential     Status: Abnormal   Result Value Ref Range    WBC Count 12.5 (H) 4.0 - 11.0 10e3/uL    RBC Count 4.61 4.40 - 5.90 10e6/uL    Hemoglobin 10.2 (L) 13.3 - 17.7 g/dL    Hematocrit 33.7 (L) 40.0 - 53.0 %    MCV 73 (L) 78 - 100 fL    MCH 22.1 (L) 26.5 - 33.0 pg    MCHC 30.3 (L) 31.5 - 36.5 g/dL    RDW 19.2 (H) 10.0 - 15.0 %    Platelet Count 416 150 - 450 10e3/uL    % Neutrophils 86 %    % Lymphocytes 3 %    % Monocytes 6 %    % Eosinophils 4 %    % Basophils 0 %    % Immature Granulocytes 1 %    NRBCs per 100 WBC 0 <1 /100    Absolute Neutrophils 10.8 (H) 1.6 - 8.3 10e3/uL    Absolute Lymphocytes 0.4 (L) 0.8 - 5.3 10e3/uL    Absolute Monocytes 0.7 0.0 - 1.3 10e3/uL    Absolute Eosinophils 0.5 0.0 - 0.7 10e3/uL    Absolute Basophils 0.0 0.0 - 0.2 10e3/uL    Absolute Immature Granulocytes 0.1 <=0.4 10e3/uL    Absolute NRBCs 0.0 10e3/uL   Extra Blue Top Tube     Status: None   Result Value Ref Range    Hold Specimen JIC    Lipase     Status: Normal   Result Value Ref Range    Lipase 23 13 - 60 U/L   EKG 12-lead, tracing only     Status: None   Result Value Ref Range    Systolic Blood Pressure  mmHg    Diastolic Blood Pressure  mmHg    Ventricular Rate 67 BPM    Atrial Rate 67 BPM    DE Interval 96 ms    QRS Duration 84 ms     ms    QTc 429 ms    P Axis 55 degrees    R AXIS 81 degrees    T Axis 77 degrees    Interpretation ECG       Sinus rhythm with short DE  Otherwise normal ECG  No previous ECGs available  Unconfirmed report - interpretation of this ECG is computer generated - see medical record for final interpretation  Confirmed by - EMERGENCY ROOM, PHYSICIAN ([id number]),  SANDRA GILL ([id number]) on 10/31/2024 7:12:31 AM     CBC with Platelets &  Differential     Status: Abnormal    Narrative    The following orders were created for panel order CBC with Platelets & Differential.  Procedure                               Abnormality         Status                     ---------                               -----------         ------                     CBC with platelets and d...[[id number]]  Abnormal            Final result                 Please view results for these tests on the individual orders.

## 2024-10-31 LAB
ATRIAL RATE - MUSE: 67 BPM
DIASTOLIC BLOOD PRESSURE - MUSE: NORMAL MMHG
INTERPRETATION ECG - MUSE: NORMAL
P AXIS - MUSE: 55 DEGREES
PR INTERVAL - MUSE: 96 MS
QRS DURATION - MUSE: 84 MS
QT - MUSE: 406 MS
QTC - MUSE: 429 MS
R AXIS - MUSE: 81 DEGREES
SYSTOLIC BLOOD PRESSURE - MUSE: NORMAL MMHG
T AXIS - MUSE: 77 DEGREES
VENTRICULAR RATE- MUSE: 67 BPM

## 2024-10-31 NOTE — ED PROVIDER NOTES
Emergency Department Note      History of Present Illness     Chief Complaint   Abdominal Pain and Nausea & Vomiting      HPI   Joseph R Barthel is a 39 year old male with a history of Crohn's disease as well as chronic nausea and vomiting who presents with abdominal discomfort which is generalized with vomiting.  He is very anxious in his appearance as well.  He states that sometimes he gets cottony cycles.  This does not feel like his Crohn's flare.  He states he had a bowel obstruction before does not feel similar to that either.  He has had multiple CT scans and is looking for symptom relief at this time.  No recent fevers.  He does have loose stool output which is normal.  No cough, cold, runny nose or sore throat.    Independent Historian   None    Review of External Notes   I reviewed his urgent care visit from earlier today.    Past Medical History     Medical History and Problem List   No past medical history on file.    Medications   ketorolac (TORADOL) 10 MG tablet  ondansetron (ZOFRAN ODT) 4 MG ODT tab  acetaminophen (TYLENOL) 500 MG tablet  adalimumab (HUMIRA) 40 mg/0.8 mL injection  ferrous gluconate 324 mg (37.5 mg iron) Tab  HYDROmorphone (DILAUDID) 2 MG tablet  methotrexate 2.5 MG tablet  nortriptyline (PAMELOR) 25 MG capsule  ondansetron (ZOFRAN ODT) 4 MG ODT tab  prenatal vitamin iron-folic acid 27mg-0.8mg (PRENATAL S) 27 mg iron- 800 mcg Tab tablet        Surgical History   No past surgical history on file.    Physical Exam     Patient Vitals for the past 24 hrs:   BP Temp Temp src Pulse Resp SpO2 Height Weight   10/30/24 2154 -- -- -- -- -- 95 % -- --   10/30/24 2152 -- -- -- -- -- 95 % -- --   10/30/24 2151 -- -- -- -- -- 95 % -- --   10/30/24 2150 -- -- -- -- -- 95 % -- --   10/30/24 2148 -- -- -- -- -- 95 % -- --   10/30/24 2032 -- -- -- 63 12 95 % -- --   10/30/24 2030 121/72 -- -- 63 -- -- -- --   10/30/24 2014 -- -- -- 93 21 99 % -- --   10/30/24 2004 130/80 -- -- 98 27 100 % -- --  "  10/30/24 1954 -- -- -- 67 -- 99 % -- --   10/30/24 1944 -- -- -- 63 22 94 % -- --   10/30/24 1934 117/76 -- -- 64 23 94 % -- --   10/30/24 1924 -- -- -- 64 10 100 % -- --   10/30/24 1923 -- -- -- 61 18 100 % -- --   10/30/24 1922 -- -- -- 71 22 100 % -- --   10/30/24 1903 118/72 -- -- -- -- 100 % -- --   10/30/24 1812 130/85 98.5  F (36.9  C) Temporal 115 20 100 % 1.676 m (5' 6\") 53.1 kg (117 lb 1 oz)     Physical Exam  Constitutional: Vital signs reviewed as above  General: Alert  HEENT: Moist mucous membranes  Eyes: Conjunctiva normal.   Neck: Normal range of motion  Cardiovascular: Tachycardic rate, Regular rhythm and normal heart sounds.  No MRG  Pulmonary/Chest: Effort normal and breath sounds normal. No respiratory distress. Patient has no wheezes. Patient has no rales.   Abdominal: Soft.  Mild diffuse tenderness.  No masses rebound or guarding.  Musculoskeletal/Extremities: Full ROM.  Endo: No pitting edema  Neurological: Alert, no focal deficits.  Skin: Skin is warm and dry.   Psychiatric: Anxious      Diagnostics     Lab Results   Labs Ordered and Resulted from Time of ED Arrival to Time of ED Departure   COMPREHENSIVE METABOLIC PANEL - Abnormal       Result Value    Sodium 137      Potassium 4.0      Carbon Dioxide (CO2) 25      Anion Gap 12      Urea Nitrogen 10.6      Creatinine 0.98      GFR Estimate >90      Calcium 9.3      Chloride 100      Glucose 102 (*)     Alkaline Phosphatase 81      AST 18      ALT 13      Protein Total 7.1      Albumin 4.2      Bilirubin Total 0.8     CBC WITH PLATELETS AND DIFFERENTIAL - Abnormal    WBC Count 12.5 (*)     RBC Count 4.61      Hemoglobin 10.2 (*)     Hematocrit 33.7 (*)     MCV 73 (*)     MCH 22.1 (*)     MCHC 30.3 (*)     RDW 19.2 (*)     Platelet Count 416      % Neutrophils 86      % Lymphocytes 3      % Monocytes 6      % Eosinophils 4      % Basophils 0      % Immature Granulocytes 1      NRBCs per 100 WBC 0      Absolute Neutrophils 10.8 (*)     " Absolute Lymphocytes 0.4 (*)     Absolute Monocytes 0.7      Absolute Eosinophils 0.5      Absolute Basophils 0.0      Absolute Immature Granulocytes 0.1      Absolute NRBCs 0.0     LIPASE - Normal    Lipase 23         Imaging   No orders to display       EKG   ECG results from 10/30/24   EKG 12-lead, tracing only     Value    Systolic Blood Pressure     Diastolic Blood Pressure     Ventricular Rate 67    Atrial Rate 67    CT Interval 96    QRS Duration 84        QTc 429    P Axis 55    R AXIS 81    T Axis 77    Interpretation ECG      Sinus rhythm with short CT  Otherwise normal ECG  No previous ECGs available             Independent Interpretation   EKG from today shows sinus rhythm, rate of 67, no ischemic changes    ED Course      Medications Administered   Medications   droPERidol (INAPSINE) injection 0.625 mg (0.625 mg Intravenous $Given 10/30/24 1909)   diphenhydrAMINE (BENADRYL) injection 25 mg (25 mg Intravenous $Given 10/30/24 1907)   ketorolac (TORADOL) injection 15 mg (15 mg Intravenous $Given 10/30/24 2012)   LORazepam (ATIVAN) injection 1 mg (1 mg Intravenous $Given 10/30/24 2012)       Procedures   Procedures     Discussion of Management   None    ED Course        Additional Documentation  None    Medical Decision Making / Diagnosis     CMS Diagnoses: None    MIPS       None    MDM   Joseph R Barthel is a 39 year old male with history of Crohn's who presents with above-mentioned concerns.  He is have this happen several times.  He does not feel that this is consistent with a Crohn's flare or bowel obstruction.  His abdominal exam here is benign.  He is quite anxious.  He was initially given droperidol and Benadryl and the nausea was much better.  He continued to have some abdominal pain.  I then gave him Toradol and Ativan and he is resting comfortably.  Repeat abdominal exam is completely benign.  We will continue to forego CT imaging as this is unlikely to  given the fact that  his belly exam is unremarkable and his labs as above were also nonconcerning.  He does have a leukocytosis of 12.5 which is likely from the vomiting.  This actually lower than it has been the last few times has been here.  I will discharge her with Toradol and Zofran.  Follow-up as needed.    Disposition   The patient was discharged.     Diagnosis     ICD-10-CM    1. Generalized abdominal pain  R10.84       2. Nausea and vomiting, unspecified vomiting type  R11.2       3. Anxiety  F41.9       4. Acute Crohn's disease without complication (H)  K50.90            Discharge Medications   Discharge Medication List as of 10/30/2024 10:19 PM            MD Donte Schuster Brent Aaron, MD  10/30/24 [id number]

## 2024-11-08 ENCOUNTER — OFFICE VISIT (OUTPATIENT)
Dept: FAMILY MEDICINE | Facility: CLINIC | Age: 39
End: 2024-11-08
Payer: COMMERCIAL

## 2024-11-08 VITALS
SYSTOLIC BLOOD PRESSURE: 125 MMHG | RESPIRATION RATE: 14 BRPM | HEART RATE: 100 BPM | BODY MASS INDEX: 17.89 KG/M2 | TEMPERATURE: 97.1 F | WEIGHT: 114 LBS | OXYGEN SATURATION: 100 % | HEIGHT: 67 IN | DIASTOLIC BLOOD PRESSURE: 76 MMHG

## 2024-11-08 DIAGNOSIS — R53.83 OTHER FATIGUE: ICD-10-CM

## 2024-11-08 DIAGNOSIS — K50.119 CROHN'S DISEASE OF LARGE INTESTINE WITH COMPLICATION (H): ICD-10-CM

## 2024-11-08 DIAGNOSIS — Z13.1 SCREENING FOR DIABETES MELLITUS: ICD-10-CM

## 2024-11-08 DIAGNOSIS — Z11.4 SCREENING FOR HIV (HUMAN IMMUNODEFICIENCY VIRUS): ICD-10-CM

## 2024-11-08 DIAGNOSIS — Z00.00 ROUTINE GENERAL MEDICAL EXAMINATION AT A HEALTH CARE FACILITY: Primary | ICD-10-CM

## 2024-11-08 DIAGNOSIS — L65.9 HAIR LOSS: ICD-10-CM

## 2024-11-08 DIAGNOSIS — Z11.59 NEED FOR HEPATITIS C SCREENING TEST: ICD-10-CM

## 2024-11-08 DIAGNOSIS — K52.9 INFLAMMATORY BOWEL DISEASE: ICD-10-CM

## 2024-11-08 DIAGNOSIS — R68.82 DECREASED LIBIDO: ICD-10-CM

## 2024-11-08 DIAGNOSIS — Z72.51 HIGH RISK SEXUAL BEHAVIOR, UNSPECIFIED TYPE: ICD-10-CM

## 2024-11-08 PROBLEM — R11.0 CHRONIC NAUSEA: Status: ACTIVE | Noted: 2017-03-19

## 2024-11-08 PROBLEM — R11.10 CHRONIC VOMITING: Status: ACTIVE | Noted: 2017-03-19

## 2024-11-08 PROBLEM — K51.90 ULCERATIVE COLITIS (H): Status: RESOLVED | Noted: 2017-03-19 | Resolved: 2024-11-08

## 2024-11-08 LAB
ERYTHROCYTE [DISTWIDTH] IN BLOOD BY AUTOMATED COUNT: 19.8 % (ref 10–15)
FASTING STATUS PATIENT QL REPORTED: YES
FERRITIN SERPL-MCNC: 8 NG/ML (ref 31–409)
GLUCOSE SERPL-MCNC: 90 MG/DL (ref 70–99)
HCT VFR BLD AUTO: 36.3 % (ref 40–53)
HGB BLD-MCNC: 10.7 G/DL (ref 13.3–17.7)
HIV 1+2 AB+HIV1 P24 AG SERPL QL IA: NONREACTIVE
HOLD SPECIMEN: NORMAL
IRON BINDING CAPACITY (ROCHE): 469 UG/DL (ref 240–430)
IRON SATN MFR SERPL: 4 % (ref 15–46)
IRON SERPL-MCNC: 18 UG/DL (ref 61–157)
MCH RBC QN AUTO: 22.3 PG (ref 26.5–33)
MCHC RBC AUTO-ENTMCNC: 29.5 G/DL (ref 31.5–36.5)
MCV RBC AUTO: 76 FL (ref 78–100)
PLATELET # BLD AUTO: 450 10E3/UL (ref 150–450)
RBC # BLD AUTO: 4.8 10E6/UL (ref 4.4–5.9)
SHBG SERPL-SCNC: 72 NMOL/L (ref 11–80)
TSH SERPL DL<=0.005 MIU/L-ACNC: 1.09 UIU/ML (ref 0.3–4.2)
WBC # BLD AUTO: 10.6 10E3/UL (ref 4–11)

## 2024-11-08 PROCEDURE — 99395 PREV VISIT EST AGE 18-39: CPT

## 2024-11-08 PROCEDURE — 82728 ASSAY OF FERRITIN: CPT

## 2024-11-08 PROCEDURE — 87389 HIV-1 AG W/HIV-1&-2 AB AG IA: CPT

## 2024-11-08 PROCEDURE — 85027 COMPLETE CBC AUTOMATED: CPT

## 2024-11-08 PROCEDURE — 84270 ASSAY OF SEX HORMONE GLOBUL: CPT

## 2024-11-08 PROCEDURE — 83540 ASSAY OF IRON: CPT

## 2024-11-08 PROCEDURE — 36415 COLL VENOUS BLD VENIPUNCTURE: CPT

## 2024-11-08 PROCEDURE — 82947 ASSAY GLUCOSE BLOOD QUANT: CPT

## 2024-11-08 PROCEDURE — 84443 ASSAY THYROID STIM HORMONE: CPT

## 2024-11-08 PROCEDURE — 83550 IRON BINDING TEST: CPT

## 2024-11-08 PROCEDURE — 99214 OFFICE O/P EST MOD 30 MIN: CPT | Mod: 25

## 2024-11-08 PROCEDURE — 84403 ASSAY OF TOTAL TESTOSTERONE: CPT

## 2024-11-08 RX ORDER — MINOXIDIL 2.5 MG/1
2.5 TABLET ORAL DAILY
COMMUNITY
Start: 2024-06-12 | End: 2024-11-08

## 2024-11-08 RX ORDER — TRAZODONE HYDROCHLORIDE 100 MG/1
100 TABLET ORAL AT BEDTIME
COMMUNITY

## 2024-11-08 RX ORDER — EMTRICITABINE AND TENOFOVIR DISOPROXIL FUMARATE 200; 300 MG/1; MG/1
1 TABLET, FILM COATED ORAL DAILY
COMMUNITY
End: 2024-11-08

## 2024-11-08 RX ORDER — EMTRICITABINE AND TENOFOVIR DISOPROXIL FUMARATE 200; 300 MG/1; MG/1
1 TABLET, FILM COATED ORAL DAILY
Qty: 90 TABLET | Refills: 2 | Status: SHIPPED | OUTPATIENT
Start: 2024-11-08

## 2024-11-08 RX ORDER — SERTRALINE HYDROCHLORIDE 100 MG/1
100 TABLET, FILM COATED ORAL DAILY
COMMUNITY

## 2024-11-08 RX ORDER — MINOXIDIL 2.5 MG/1
2.5 TABLET ORAL DAILY
Qty: 30 TABLET | Refills: 11 | Status: SHIPPED | OUTPATIENT
Start: 2024-11-08

## 2024-11-08 SDOH — HEALTH STABILITY: PHYSICAL HEALTH: ON AVERAGE, HOW MANY DAYS PER WEEK DO YOU ENGAGE IN MODERATE TO STRENUOUS EXERCISE (LIKE A BRISK WALK)?: 3 DAYS

## 2024-11-08 ASSESSMENT — SOCIAL DETERMINANTS OF HEALTH (SDOH): HOW OFTEN DO YOU GET TOGETHER WITH FRIENDS OR RELATIVES?: THREE TIMES A WEEK

## 2024-11-08 ASSESSMENT — PAIN SCALES - GENERAL: PAINLEVEL_OUTOF10: NO PAIN (0)

## 2024-11-08 NOTE — PROGRESS NOTES
Preventive Care Visit  [facility]  ZAYDA Dumont CNP, Family Medicine  Nov 8, 2024    Here to establish care. History of PrEP, good compliance  Crohn's with colectomy and J Pouch; continues to have nasuea and vomiting; goes to ER for antinausea and hydration  Assessment & Plan   Routine general medical examination   Inflammatory bowel disease  Crohn's disease of large intestine with complication (H)  J pouch, vomiting; struggles with lack of appetite  - Adult GI  Referral - Consult Only  - Adult Nutrition  Referral      Screening for diabetes mellitus  - Glucose  - Glucose    High risk sexual behavior, unspecified type  Screening for HIV (human immunodeficiency virus)  Prep, has good compliance and monitoring; plan for labs every 3 months and in person/video visit with labs every 6 months  - emtricitabine-tenofovir (TRUVADA) 200-300 MG per tablet  Dispense: 90 tablet; Refill: 2  - HIV Antigen Antibody Combo  - HIV Antigen Antibody Combo    Other fatigue  Nutrition vs hormone cause  - TSH with free T4 reflex  - CBC with Platelets and Reflex to Iron Studies  - TSH with free T4 reflex  - CBC with Platelets and Reflex to Iron Studies  - Iron & Iron Binding Capacity  - Ferritin    Hair loss  Improving with minoxidil  - minoxidil (LONITEN) 2.5 MG tablet  Dispense: 30 tablet; Refill: 11    Decreased libido  - Testosterone Free and Total  - Testosterone Free and Total    Crohn's   September ER anemia with crohns, still low  Stress related flares; vomiting at night, abdominal pain with that;   Last scopes spring 2022    Panic  Wondering if vomiting is related to anxiety and panic    Hormones  Fatigue, low sex drive  [place]-patient will have labs checked every three months with in person appointment every 6 months            MED REC REQUIRED  Post Medication Reconciliation Status:     Nicotine/Tobacco Cessation  He reports that he has been smoking cigarettes. He has never  used smokeless tobacco.  Nicotine/Tobacco Cessation Plan  Information offered: Patient not interested at this time      Counseling  Appropriate preventive services were addressed with this patient via screening, questionnaire, or discussion as appropriate for fall prevention, nutrition, physical activity, Tobacco-use cessation, social engagement, weight loss and cognition.  Checklist reviewing preventive services available has been given to the patient.  Reviewed patient's diet, addressing concerns and/or questions.   He is at risk for lack of exercise and has been provided with information to increase physical activity for the benefit of his well-being.           Marcelino Laughlin is a 39 year old, presenting for the following:  Physical (Here today for his Routine Medical Exam. Fasting for labs. Establish Care with new PCP, Needs Medications.  Requesting to have a blood panel done, along with his hormone levels. ), ER F/U (Was in the ER 2 times since September - panic attacks, vomiting, dehydration, triggers his Crohn's. Needs referral to a GI Specialist.  ), and Referral (Wondering about a referral to a Nutritionist due to poor diet due to Crohn's triggers. )        11/8/2024     1:47 PM   Additional Questions   Roomed by Palmira Quintana CMA   Accompanied by Self          HPI    Medication Follow Up  Taking Medication as prescribed: yes  Side Effects:  None  Medication Helping Symptoms:  yes      Health Care Directive  Patient does not have a Health Care Directive: Discussed advance care planning with patient; however, patient declined at this time.      11/8/2024   General Health   How would you rate your overall physical health? (!) POOR   Feel stress (tense, anxious, or unable to sleep) Very much      (!) STRESS CONCERN      11/8/2024   Nutrition   Three or more servings of calcium each day? (!) NO   Diet: Breakfast skipped    Other   If other, please elaborate: crohn's   How many servings of fruit and  vegetables per day? (!) 0-1   How many sweetened beverages each day? 0-1       Multiple values from one day are sorted in reverse-chronological order         11/8/2024   Exercise   Days per week of moderate/strenous exercise 3 days            11/8/2024   Social Factors   Frequency of gathering with friends or relatives Three times a week   Worry food won't last until get money to buy more No   Food not last or not have enough money for food? No   Do you have housing? (Housing is defined as stable permanent housing and does not include staying ouside in a car, in a tent, in an abandoned building, in an overnight shelter, or couch-surfing.) Yes   Are you worried about losing your housing? Yes   Lack of transportation? No   Unable to get utilities (heat,electricity)? No   Want help with housing or utility concern? No      (!) HOUSING CONCERN PRESENT      11/8/2024   Dental   Dentist two times every year? Yes            11/8/2024   TB Screening   Were you born outside of the [place]? No            Today's PHQ-2 Score:       11/8/2024     1:41 PM   PHQ-2 ( 1999 [employer])   Q1: Little interest or pleasure in doing things 1    Q2: Feeling down, depressed or hopeless 1    PHQ-2 Score 2    Q1: Little interest or pleasure in doing things Several days   Q2: Feeling down, depressed or hopeless Several days   PHQ-2 Score 2       Patient-reported           11/8/2024   Substance Use   Alcohol more than 3/day or more than 7/wk No   Do you use any other substances recreationally? (!) OTHER    (!) DECLINE       Multiple values from one day are sorted in reverse-chronological order     Social History     Tobacco Use    Smoking status: Some Days     Types: Cigarettes    Smokeless tobacco: Never   Vaping Use    Vaping status: Never Used             11/8/2024   One time HIV Screening   Previous HIV test? Yes          11/8/2024   STI Screening   New sexual partner(s) since last STI/HIV test? No            11/8/2024   Contraception/Family  "Planning   Questions about contraception or family planning No           Reviewed and updated as needed this visit by Provider                      Review of Systems   Constitutional:  Positive for fatigue.   Gastrointestinal:  Positive for abdominal pain, nausea (To ER multiple times for nausea, vomiting, and dehydration; would like referral for nutrition and GI) and vomiting. Negative for blood in stool.   Genitourinary:  Negative for penile pain, penile swelling, scrotal swelling and testicular pain.        Decreased libido   Psychiatric/Behavioral:  The patient is nervous/anxious (wondering if nausea is releated to anxiety; unclear if anxiety leads to nasuea or if nausea leads to anxiety).    All other systems reviewed and are negative.         Objective    Exam  /76 (BP Location: Right arm, Patient Position: Sitting, Cuff Size: Adult Regular)   Pulse 100   Temp 97.1  F (36.2  C) (Oral)   Resp 14   Ht 1.702 m (5' 7\")   Wt 51.7 kg (114 lb)   SpO2 100%   BMI 17.85 kg/m     Estimated body mass index is 17.85 kg/m  as calculated from the following:    Height as of this encounter: 1.702 m (5' 7\").    Weight as of this encounter: 51.7 kg (114 lb).    Physical Exam  Vitals and nursing note reviewed.   Constitutional:       General: He is not in acute distress.     Appearance: Normal appearance. He is normal weight. He is not ill-appearing, toxic-appearing or diaphoretic.   HENT:      Head: Normocephalic and atraumatic.      Right Ear: Tympanic membrane, ear canal and external ear normal.      Left Ear: Tympanic membrane, ear canal and external ear normal.      Nose: Nose normal.      Mouth/Throat:      Mouth: Mucous membranes are moist.      Pharynx: No oropharyngeal exudate or posterior oropharyngeal erythema.   Eyes:      Extraocular Movements: Extraocular movements intact.      Conjunctiva/sclera: Conjunctivae normal.      Pupils: Pupils are equal, round, and reactive to light.   Cardiovascular:      " Rate and Rhythm: Normal rate and regular rhythm.      Pulses: Normal pulses.      Heart sounds: Normal heart sounds.   Pulmonary:      Effort: Pulmonary effort is normal.      Breath sounds: Normal breath sounds.   Abdominal:      General: Abdomen is flat. A surgical scar is present. Bowel sounds are normal. There is no distension.      Palpations: Abdomen is soft. There is no mass.      Tenderness: There is no abdominal tenderness. There is no guarding or rebound.      Hernia: No hernia is present.   Musculoskeletal:         General: Normal range of motion.      Cervical back: Normal range of motion and neck supple.   Skin:     General: Skin is warm and dry.      Capillary Refill: Capillary refill takes less than 2 seconds.   Neurological:      General: No focal deficit present.      Mental Status: He is alert.   Psychiatric:         Mood and Affect: Mood normal.         Behavior: Behavior normal.         Thought Content: Thought content normal.         Judgment: Judgment normal.               Signed Electronically by: ZAYDA Dumont CNP

## 2024-11-08 NOTE — LETTER
November 14, 2024      Joseph R Barthel  [id number] [facility]   [facility], [place] [address]        Dear Mr.Barthel,    We are writing to inform you of your test results.    I've attached your lab results. Your iron is definitely low, but your hemoglobin is improving. Keep taking the multivitamin if you tolerate it and we'll recheck this with your next set of labs.     HIV was negative.     Your thyroid function is normal.     Your blood sugar was also normal, so no worries about diabetes at this time.     Your testosterone is in the normal range.     Reach out with questions.     Resulted Orders   HIV Antigen Antibody Combo   Result Value Ref Range    HIV Antigen Antibody Combo Nonreactive Nonreactive      Comment:      Negative HIV-1 p24 antigen and HIV-1/2 antibody screening test results usually indicate the absence of HIV-1 and HIV-2 infection. However, such negative results do not rule-out acute HIV infection.  If acute HIV-1 or HIV-2 infection is suspected, detection of HIV-1 or HIV-2 RNA  is recommended. This result is obtained using the Roche Elecsys HIV Duo method on the brett e801 immunoassay analyzer.   TSH with free T4 reflex   Result Value Ref Range    TSH 1.09 0.30 - 4.20 uIU/mL   Glucose   Result Value Ref Range    Glucose 90 70 - 99 mg/dL    Patient Fasting > 8hrs? Yes    Sex Hormone Binding Globulin   Result Value Ref Range    Sex Hormone Binding Globulin 72 11 - 80 nmol/L   Testosterone Free and Total   Result Value Ref Range    Free Testosterone Calculated 6.60 ng/dL      Comment:      Male John Ranges:  John Stage I: Less than or equal to 0.37 ng/dL  John Stage II: 0.03-2.1 ng/dL  John Stage III: 0.10-9.8 ng/dL  John Stage IV: 3.5-16.9 ng/dL  John Stage V: 4.1-23.9 ng/dL    Testosterone Total 541 240 - 950 ng/dL    Narrative    This test was developed and its performance characteristics determined by the [facility],  [facility]. It has not been  cleared or approved by the FDA. The laboratory is regulated under CLIA as qualified to perform high-complexity testing. This test is used for clinical purposes. It should not be regarded as investigational or for research.   CBC with Platelets and Reflex to Iron Studies   Result Value Ref Range    WBC Count 10.6 4.0 - 11.0 10e3/uL    RBC Count 4.80 4.40 - 5.90 10e6/uL    Hemoglobin 10.7 (L) 13.3 - 17.7 g/dL    Hematocrit 36.3 (L) 40.0 - 53.0 %    MCV 76 (L) 78 - 100 fL    MCH 22.3 (L) 26.5 - 33.0 pg    MCHC 29.5 (L) 31.5 - 36.5 g/dL    RDW 19.8 (H) 10.0 - 15.0 %    Platelet Count 450 150 - 450 10e3/uL   Extra Green Top (Lithium Heparin) Tube   Result Value Ref Range    Hold Specimen [facility]    Iron & Iron Binding Capacity   Result Value Ref Range    Iron 18 (L) 61 - 157 ug/dL    Iron Binding Capacity 469 (H) 240 - 430 ug/dL    Iron Sat Index 4 (L) 15 - 46 %   Ferritin   Result Value Ref Range    Ferritin 8 (L) 31 - 409 ng/mL       If you have any questions or concerns, please call the clinic at the number listed above.       Sincerely,      ZAYDA Jackman CNP

## 2024-11-09 ASSESSMENT — ENCOUNTER SYMPTOMS
FATIGUE: 1
VOMITING: 1
ABDOMINAL PAIN: 1
BLOOD IN STOOL: 0
NERVOUS/ANXIOUS: 1
NAUSEA: 1

## 2024-11-10 ENCOUNTER — APPOINTMENT (OUTPATIENT)
Dept: CT IMAGING | Facility: CLINIC | Age: 39
End: 2024-11-10
Attending: EMERGENCY MEDICINE
Payer: COMMERCIAL

## 2024-11-10 ENCOUNTER — HOSPITAL ENCOUNTER (EMERGENCY)
Facility: CLINIC | Age: 39
Discharge: HOME | End: 2024-11-10
Attending: EMERGENCY MEDICINE | Admitting: EMERGENCY MEDICINE
Payer: COMMERCIAL

## 2024-11-10 VITALS
HEART RATE: 77 BPM | SYSTOLIC BLOOD PRESSURE: 109 MMHG | RESPIRATION RATE: 22 BRPM | HEIGHT: 67 IN | TEMPERATURE: 98.4 F | BODY MASS INDEX: 18.05 KG/M2 | DIASTOLIC BLOOD PRESSURE: 65 MMHG | WEIGHT: 115 LBS | OXYGEN SATURATION: 100 %

## 2024-11-10 DIAGNOSIS — R11.2 NAUSEA AND VOMITING: ICD-10-CM

## 2024-11-10 LAB
ALBUMIN SERPL BCG-MCNC: 4.1 G/DL (ref 3.5–5.2)
ALP SERPL-CCNC: 80 U/L (ref 40–150)
ALT SERPL W P-5'-P-CCNC: 11 U/L (ref 0–70)
ANION GAP SERPL CALCULATED.3IONS-SCNC: 17 MMOL/L (ref 7–15)
AST SERPL W P-5'-P-CCNC: 18 U/L (ref 0–45)
BASOPHILS # BLD AUTO: 0.1 10E3/UL (ref 0–0.2)
BASOPHILS NFR BLD AUTO: 1 %
BILIRUB SERPL-MCNC: 0.8 MG/DL
BUN SERPL-MCNC: 13.8 MG/DL (ref 6–20)
CALCIUM SERPL-MCNC: 9.1 MG/DL (ref 8.8–10.4)
CHLORIDE SERPL-SCNC: 103 MMOL/L (ref 98–107)
CREAT SERPL-MCNC: 0.98 MG/DL (ref 0.67–1.17)
CRP SERPL-MCNC: <3 MG/L
EGFRCR SERPLBLD CKD-EPI 2021: >90 ML/MIN/1.73M2
EOSINOPHIL # BLD AUTO: 0.1 10E3/UL (ref 0–0.7)
EOSINOPHIL NFR BLD AUTO: 1 %
ERYTHROCYTE [DISTWIDTH] IN BLOOD BY AUTOMATED COUNT: 19.4 % (ref 10–15)
ERYTHROCYTE [SEDIMENTATION RATE] IN BLOOD BY WESTERGREN METHOD: 16 MM/HR (ref 0–15)
GLUCOSE SERPL-MCNC: 137 MG/DL (ref 70–99)
HCO3 SERPL-SCNC: 20 MMOL/L (ref 22–29)
HCT VFR BLD AUTO: 35.4 % (ref 40–53)
HGB BLD-MCNC: 10.7 G/DL (ref 13.3–17.7)
HOLD SPECIMEN: NORMAL
HOLD SPECIMEN: NORMAL
IMM GRANULOCYTES # BLD: 0.1 10E3/UL
IMM GRANULOCYTES NFR BLD: 0 %
LIPASE SERPL-CCNC: 21 U/L (ref 13–60)
LYMPHOCYTES # BLD AUTO: 0.6 10E3/UL (ref 0.8–5.3)
LYMPHOCYTES NFR BLD AUTO: 5 %
MAGNESIUM SERPL-MCNC: 2 MG/DL (ref 1.7–2.3)
MCH RBC QN AUTO: 22.1 PG (ref 26.5–33)
MCHC RBC AUTO-ENTMCNC: 30.2 G/DL (ref 31.5–36.5)
MCV RBC AUTO: 73 FL (ref 78–100)
MONOCYTES # BLD AUTO: 0.8 10E3/UL (ref 0–1.3)
MONOCYTES NFR BLD AUTO: 6 %
NEUTROPHILS # BLD AUTO: 11.2 10E3/UL (ref 1.6–8.3)
NEUTROPHILS NFR BLD AUTO: 87 %
NRBC # BLD AUTO: 0 10E3/UL
NRBC BLD AUTO-RTO: 0 /100
PLATELET # BLD AUTO: 514 10E3/UL (ref 150–450)
POTASSIUM SERPL-SCNC: 4.2 MMOL/L (ref 3.4–5.3)
PROT SERPL-MCNC: 7.2 G/DL (ref 6.4–8.3)
RBC # BLD AUTO: 4.84 10E6/UL (ref 4.4–5.9)
SODIUM SERPL-SCNC: 140 MMOL/L (ref 135–145)
WBC # BLD AUTO: 12.8 10E3/UL (ref 4–11)

## 2024-11-10 PROCEDURE — 83735 ASSAY OF MAGNESIUM: CPT | Performed by: EMERGENCY MEDICINE

## 2024-11-10 PROCEDURE — 96361 HYDRATE IV INFUSION ADD-ON: CPT

## 2024-11-10 PROCEDURE — 86140 C-REACTIVE PROTEIN: CPT | Performed by: EMERGENCY MEDICINE

## 2024-11-10 PROCEDURE — 36415 COLL VENOUS BLD VENIPUNCTURE: CPT | Performed by: EMERGENCY MEDICINE

## 2024-11-10 PROCEDURE — 99285 EMERGENCY DEPT VISIT HI MDM: CPT | Mod: 25

## 2024-11-10 PROCEDURE — 83690 ASSAY OF LIPASE: CPT | Performed by: EMERGENCY MEDICINE

## 2024-11-10 PROCEDURE — 250N000011 HC RX IP 250 OP 636: Performed by: EMERGENCY MEDICINE

## 2024-11-10 PROCEDURE — 96374 THER/PROPH/DIAG INJ IV PUSH: CPT | Mod: 59

## 2024-11-10 PROCEDURE — 250N000011 HC RX IP 250 OP 636

## 2024-11-10 PROCEDURE — 85004 AUTOMATED DIFF WBC COUNT: CPT | Performed by: EMERGENCY MEDICINE

## 2024-11-10 PROCEDURE — 74177 CT ABD & PELVIS W/CONTRAST: CPT

## 2024-11-10 PROCEDURE — 85652 RBC SED RATE AUTOMATED: CPT | Performed by: EMERGENCY MEDICINE

## 2024-11-10 PROCEDURE — 258N000003 HC RX IP 258 OP 636: Performed by: EMERGENCY MEDICINE

## 2024-11-10 PROCEDURE — 80053 COMPREHEN METABOLIC PANEL: CPT | Performed by: EMERGENCY MEDICINE

## 2024-11-10 PROCEDURE — 96375 TX/PRO/DX INJ NEW DRUG ADDON: CPT

## 2024-11-10 RX ORDER — ONDANSETRON 2 MG/ML
4 INJECTION INTRAMUSCULAR; INTRAVENOUS ONCE
Status: COMPLETED | OUTPATIENT
Start: 2024-11-10 | End: 2024-11-10

## 2024-11-10 RX ORDER — HYDROMORPHONE HYDROCHLORIDE 1 MG/ML
0.5 INJECTION, SOLUTION INTRAMUSCULAR; INTRAVENOUS; SUBCUTANEOUS
Status: COMPLETED | OUTPATIENT
Start: 2024-11-10 | End: 2024-11-10

## 2024-11-10 RX ORDER — PROMETHAZINE HYDROCHLORIDE 25 MG/1
25 TABLET ORAL EVERY 6 HOURS PRN
Qty: 30 TABLET | Refills: 0 | Status: SHIPPED | OUTPATIENT
Start: 2024-11-10

## 2024-11-10 RX ORDER — IOPAMIDOL 755 MG/ML
500 INJECTION, SOLUTION INTRAVASCULAR ONCE
Status: COMPLETED | OUTPATIENT
Start: 2024-11-10 | End: 2024-11-10

## 2024-11-10 RX ORDER — KETOROLAC TROMETHAMINE 15 MG/ML
15 INJECTION, SOLUTION INTRAMUSCULAR; INTRAVENOUS ONCE
Status: COMPLETED | OUTPATIENT
Start: 2024-11-10 | End: 2024-11-10

## 2024-11-10 RX ADMIN — ONDANSETRON 4 MG: 2 INJECTION INTRAMUSCULAR; INTRAVENOUS at 04:54

## 2024-11-10 RX ADMIN — HYDROMORPHONE HYDROCHLORIDE 0.5 MG: 1 INJECTION, SOLUTION INTRAMUSCULAR; INTRAVENOUS; SUBCUTANEOUS at 05:22

## 2024-11-10 RX ADMIN — IOPAMIDOL 58 ML: 755 INJECTION, SOLUTION INTRAVENOUS at 05:53

## 2024-11-10 RX ADMIN — KETOROLAC TROMETHAMINE 15 MG: 15 INJECTION, SOLUTION INTRAMUSCULAR; INTRAVENOUS at 05:24

## 2024-11-10 RX ADMIN — SODIUM CHLORIDE 1000 ML: 9 INJECTION, SOLUTION INTRAVENOUS at 04:58

## 2024-11-10 RX ADMIN — PROCHLORPERAZINE EDISYLATE 10 MG: 5 INJECTION INTRAMUSCULAR; INTRAVENOUS at 07:35

## 2024-11-10 ASSESSMENT — COLUMBIA-SUICIDE SEVERITY RATING SCALE - C-SSRS
2. HAVE YOU ACTUALLY HAD ANY THOUGHTS OF KILLING YOURSELF IN THE PAST MONTH?: NO
1. IN THE PAST MONTH, HAVE YOU WISHED YOU WERE DEAD OR WISHED YOU COULD GO TO SLEEP AND NOT WAKE UP?: NO
6. HAVE YOU EVER DONE ANYTHING, STARTED TO DO ANYTHING, OR PREPARED TO DO ANYTHING TO END YOUR LIFE?: NO

## 2024-11-10 ASSESSMENT — ACTIVITIES OF DAILY LIVING (ADL)
ADLS_ACUITY_SCORE: 0

## 2024-11-10 NOTE — DISCHARGE INSTRUCTIONS
I sent you a prescription for nausea, please take this medication as prescribed.   You stated that you have a referral for gastroenterology, please contact them to schedule an appointment.     Discharge Instructions  Abdominal Pain    Abdominal pain (belly pain) can be caused by many things. Your evaluation today does not show the exact cause for your pain. Your provider today has decided that it is unlikely your pain is due to a life threatening problem, or a problem requiring surgery or hospital admission. Sometimes those problems cannot be found right away, so it is very important that you follow up as directed.  Sometimes only the changes which occur over time allow the cause of your pain to be found.    Generally, every Emergency Department visit should have a follow-up clinic visit with either a primary or a specialty clinic/provider. Please follow-up as instructed by your emergency provider today. With abdominal pain, we often recommend very close follow-up, such as the following day.    ADULTS:  Return to the Emergency Department right away if:    You get an oral temperature above 102oF or as directed by your provider.  You have blood in your stools. This may be bright red or appear as black, tarry stools.    You keep vomiting (throwing up) or cannot drink liquids.  You see blood when you vomit.   You cannot have a bowel movement or you cannot pass gas.  Your stomach gets bloated or bigger.  Your skin or the whites of your eyes look yellow.  You faint.  You have bloody, frequent or painful urination (peeing).  You have new symptoms or anything that worries you.    CHILDREN:  Return to the Emergency Department right away if your child has any of the above-listed symptoms or the following:    Pushes your hand away or screams/cries when his/her belly is touched.  You notice your child is very fussy or weak.  Your child is very tired and is too tired to eat or drink.  Your child is dehydrated.  Signs of  dehydration can be:  Significant change in the amount of wet diapers/urine.  Your infant or child starts to have dry mouth and lips, or no saliva (spit) or tears.    PREGNANT WOMEN:  Return to the Emergency Department right away if you have any of the above-listed symptoms or the following:    You have bleeding, leaking fluid or passing tissue from the vagina.  You have worse pain or cramping, or pain in your shoulder or back.  You have vomiting that will not stop.  You have a temperature of 100oF or more.  Your baby is not moving as much as usual.  You faint.  You get a bad headache with or without eye problems and abdominal pain.  You have a seizure.  You have unusual discharge from your vagina and abdominal pain.    Abdominal pain is pretty common during pregnancy.  Your pain may or may not be related to your pregnancy. You should follow-up closely with your OB provider so they can evaluate you and your baby.  Until you follow-up with your regular provider, do the following:     Avoid sex and do not put anything in your vagina.  Drink clear fluids.  Only take medications approved by your provider.    MORE INFORMATION:    Appendicitis:  A possible cause of abdominal pain in any person who still has their appendix is acute appendicitis. Appendicitis is often hard to diagnose.  Testing does not always rule out early appendicitis or other causes of abdominal pain. Close follow-up with your provider and re-evaluations may be needed to figure out the reason for your abdominal pain.    Follow-up:  It is very important that you make an appointment with your clinic and go to the appointment.  If you do not follow-up with your primary provider, it may result in missing an important development which could result in permanent injury or disability and/or lasting pain.  If there is any problem keeping your appointment, call your provider or return to the Emergency Department.    Medications:  Take your medications as directed  "by your provider today.  Before using over-the-counter medications, ask your provider and make sure to take the medications as directed.  If you have any questions about medications, ask your provider.    Diet:  Resume your normal diet as much as possible, but do not eat fried, fatty or spicy foods while you have pain.  Do not drink alcohol or have caffeine.  Do not smoke tobacco.    Probiotics: If you have been given an antibiotic, you may want to also take a probiotic pill or eat yogurt with live cultures. Probiotics have \"good bacteria\" to help your intestines stay healthy. Studies have shown that probiotics help prevent diarrhea (loose stools) and other intestine problems (including C. diff infection) when you take antibiotics. You can buy these without a prescription in the pharmacy section of the store.     If you were given a prescription for medicine here today, be sure to read all of the information (including the package insert) that comes with your prescription.  This will include important information about the medicine, its side effects, and any warnings that you need to know about.  The pharmacist who fills the prescription can provide more information and answer questions you may have about the medicine.  If you have questions or concerns that the pharmacist cannot address, please call or return to the Emergency Department.       Remember that you can always come back to the Emergency Department if you are not able to see your regular provider in the amount of time listed above, if you get any new symptoms, or if there is anything that worries you.   "

## 2024-11-10 NOTE — ED TRIAGE NOTES
Here for concern of n/v since 8:30pm associated with diffuse abdominal pain. Stated unintentional lost of about 20lbs in the past 6 months. Tried zofran around midnight but not helping. ABCs intact     Triage Assessment (Adult)       Row Name 11/10/24 0447          Triage Assessment    Airway WDL WDL        Respiratory WDL    Respiratory WDL WDL        Cardiac WDL    Cardiac WDL WDL

## 2024-11-10 NOTE — ED PROVIDER NOTES
ED APC SUPERVISION NOTE:   I evaluated this patient in conjunction with Addi Cheek NP  I have participated in the care of the patient and personally performed key elements of the history, exam, and medical decision making.      HPI:   Joseph R Barthel is a 39 year old male with a history of Crohn's disease who presents with nausea, vomiting, and abdominal pain. The patient reports that he has been vomiting for a few days with generalized abdominal pain reminiscent of previous Crohn's flares. States that he also has new joint pains which he has not had with previous flares. He does use THC occasionally but had these flares before he started using THC. He is meeting with a new gastroenterologist later this week.       Independent Historian:   None    Review of External Notes:   I reviewed primary care office visit from 11/8/2024.  This reviewed his history of Crohn's disease with a J-pouch and recurrent vomiting.  He was referred to GI at that point.     EXAM:   Physical Exam  Vitals and nursing note reviewed.   Constitutional:       General: He is not in acute distress.     Appearance: He is not ill-appearing.   HENT:      Head: Normocephalic and atraumatic.      Right Ear: External ear normal.      Left Ear: External ear normal.      Nose: Nose normal.   Eyes:      Conjunctiva/sclera: Conjunctivae normal.   Cardiovascular:      Rate and Rhythm: Normal rate and regular rhythm.      Heart sounds: No murmur heard.  Pulmonary:      Effort: Pulmonary effort is normal. No respiratory distress.      Breath sounds: No wheezing, rhonchi or rales.   Abdominal:      General: Abdomen is flat. There is no distension.      Palpations: Abdomen is soft.      Tenderness: There is generalized abdominal tenderness (mild). There is no guarding or rebound.   Musculoskeletal:         General: No swelling or deformity.      Cervical back: Normal range of motion and neck supple.   Skin:     General: Skin is warm and dry.       Findings: No rash.   Neurological:      Mental Status: He is alert and oriented to person, place, and time.   Psychiatric:         Behavior: Behavior normal.           Independent Interpretation (X-rays, CTs, rhythm strip):  None    Consultations/Discussion of Management or Tests:  None     MEDICAL DECISION MAKING/ASSESSMENT AND PLAN:   39-year-old male presenting with nausea and vomiting with abdominal pain.  He has history of Crohn's and has had episodes similar to this in the past when he has been seen in the ER.  Differential diagnosis includes Crohn's flare, bowel obstruction, pancreatitis, gastroenteritis, etc.  His abdominal exam is relatively benign.  Labs show mild leukocytosis which is likely from vomiting and the labs also are consistent with mild dehydration.  His inflammatory markers are otherwise not significantly elevated so very low suspicion for a Crohn's flare at this point.  CT was performed and showed possible enteritis but no other acute findings.  Patient's nausea improved and he was able to tolerate p.o. in the ED.  He is supposed to follow-up with GI later this week and we encouraged him to do this as planned.  We will prescribe additional antiemetics for home for the patient.    The patient was discharged.      DIAGNOSIS:     ICD-10-CM    1. Nausea and vomiting  R11.2         Scribe Disclosure:  Camille HIGGINBOTHAM, am serving as a scribe at 6:44 AM on 11/10/2024 to document services personally performed by Ryder Mcmanus MD based on my observations and the provider's statements to me.     11/10/2024  [facility] EMERGENCY DEPT     Ryder Mcmanus MD  11/10/24 3264

## 2024-11-10 NOTE — PATIENT INSTRUCTIONS
Patient Education   Preventive Care Advice   This is general advice given by our system to help you stay healthy. However, your care team may have specific advice just for you. Please talk to your care team about your preventive care needs.  Nutrition  Eat 5 or more servings of fruits and vegetables each day.  Try wheat bread, brown rice and whole grain pasta (instead of white bread, rice, and pasta).  Get enough calcium and vitamin D. Check the label on foods and aim for 100% of the RDA (recommended daily allowance).  Lifestyle  Exercise at least 150 minutes each week  (30 minutes a day, 5 days a week).  Do muscle strengthening activities 2 days a week. These help control your weight and prevent disease.  No smoking.  Wear sunscreen to prevent skin cancer.  Have a dental exam and cleaning every 6 months.  Yearly exams  See your health care team every year to talk about:  Any changes in your health.  Any medicines your care team has prescribed.  Preventive care, family planning, and ways to prevent chronic diseases.  Shots (vaccines)   HPV shots (up to age 26), if you've never had them before.  Hepatitis B shots (up to age 59), if you've never had them before.  COVID-19 shot: Get this shot when it's due.  Flu shot: Get a flu shot every year.  Tetanus shot: Get a tetanus shot every 10 years.  Pneumococcal, hepatitis A, and RSV shots: Ask your care team if you need these based on your risk.  Shingles shot (for age 50 and up)  General health tests  Diabetes screening:  Starting at age 35, Get screened for diabetes at least every 3 years.  If you are younger than age 35, ask your care team if you should be screened for diabetes.  Cholesterol test: At age 39, start having a cholesterol test every 5 years, or more often if advised.  Bone density scan (DEXA): At age 50, ask your care team if you should have this scan for osteoporosis (brittle bones).  Hepatitis C: Get tested at least once in your life.  STIs (sexually  transmitted infections)  Before age 24: Ask your care team if you should be screened for STIs.  After age 24: Get screened for STIs if you're at risk. You are at risk for STIs (including HIV) if:  You are sexually active with more than one person.  You don't use condoms every time.  You or a partner was diagnosed with a sexually transmitted infection.  If you are at risk for HIV, ask about PrEP medicine to prevent HIV.  Get tested for HIV at least once in your life, whether you are at risk for HIV or not.  Cancer screening tests  Cervical cancer screening: If you have a cervix, begin getting regular cervical cancer screening tests starting at age 21.  Breast cancer scan (mammogram): If you've ever had breasts, begin having regular mammograms starting at age 40. This is a scan to check for breast cancer.  Colon cancer screening: It is important to start screening for colon cancer at age 45.  Have a colonoscopy test every 10 years (or more often if you're at risk) Or, ask your provider about stool tests like a FIT test every year or Cologuard test every 3 years.  To learn more about your testing options, visit:   .  For help making a decision, visit:   https://bit.ly/xv55372.  Prostate cancer screening test: If you have a prostate, ask your care team if a prostate cancer screening test (PSA) at age 55 is right for you.  Lung cancer screening: If you are a current or former smoker ages 50 to 80, ask your care team if ongoing lung cancer screenings are right for you.  For informational purposes only. Not to replace the advice of your health care provider. Copyright   2023 [facility]. All rights reserved. Clinically reviewed by the [facility]. [employer] [id number] - REV 01/24.  Learning About Stress  What is stress?     Stress is your body's response to a hard situation. Your body can have a physical, emotional, or mental response. Stress is a fact of life for most people, and it  affects everyone differently. What causes stress for you may not be stressful for someone else.  A lot of things can cause stress. You may feel stress when you go on a job interview, take a test, or run a race. This kind of short-term stress is normal and even useful. It can help you if you need to work hard or react quickly. For example, stress can help you finish an important job on time.  Long-term stress is caused by ongoing stressful situations or events. Examples of long-term stress include long-term health problems, ongoing problems at work, or conflicts in your family. Long-term stress can harm your health.  How does stress affect your health?  When you are stressed, your body responds as though you are in danger. It makes hormones that speed up your heart, make you breathe faster, and give you a burst of energy. This is called the fight-or-flight stress response. If the stress is over quickly, your body goes back to normal and no harm is done.  But if stress happens too often or lasts too long, it can have bad effects. Long-term stress can make you more likely to get sick, and it can make symptoms of some diseases worse. If you tense up when you are stressed, you may develop neck, shoulder, or low back pain. Stress is linked to high blood pressure and heart disease.  Stress also harms your emotional health. It can make you taveras, tense, or depressed. Your relationships may suffer, and you may not do well at work or school.  What can you do to manage stress?  You can try these things to help manage stress:   Do something active. Exercise or activity can help reduce stress. Walking is a great way to get started. Even everyday activities such as housecleaning or yard work can help.  Try yoga or shruti chi. These techniques combine exercise and meditation. You may need some training at first to learn them.  Do something you enjoy. For example, listen to music or go to a movie. Practice your hobby or do volunteer  "work.  Meditate. This can help you relax, because you are not worrying about what happened before or what may happen in the future.  Do guided imagery. Imagine yourself in any setting that helps you feel calm. You can use online videos, books, or a teacher to guide you.  Do breathing exercises. For example:  From a standing position, bend forward from the waist with your knees slightly bent. Let your arms dangle close to the floor.  Breathe in slowly and deeply as you return to a standing position. Roll up slowly and lift your head last.  Hold your breath for just a few seconds in the standing position.  Breathe out slowly and bend forward from the waist.  Let your feelings out. Talk, laugh, cry, and express anger when you need to. Talking with supportive friends or family, a counselor, or a nayeli leader about your feelings is a healthy way to relieve stress. Avoid discussing your feelings with people who make you feel worse.  Write. It may help to write about things that are bothering you. This helps you find out how much stress you feel and what is causing it. When you know this, you can find better ways to cope.  What can you do to prevent stress?  You might try some of these things to help prevent stress:  Manage your time. This helps you find time to do the things you want and need to do.  Get enough sleep. Your body recovers from the stresses of the day while you are sleeping.  Get support. Your family, friends, and community can make a difference in how you experience stress.  Limit your news feed. Avoid or limit time on social media or news that may make you feel stressed.  Do something active. Exercise or activity can help reduce stress. Walking is a great way to get started.  Where can you learn more?  Go to https://www.[employer].net/patiented  Enter N032 in the search box to learn more about \"Learning About Stress.\"  Current as of: October 24, 2023  Content Version: 14.2 2024 [employer]. "   Care instructions adapted under license by your healthcare professional. If you have questions about a medical condition or this instruction, always ask your healthcare professional. Health[employer], [employer] disclaims any warranty or liability for your use of this information.    Substance Use Disorder: Care Instructions  Overview     You can improve your life and health by stopping your use of alcohol or drugs. When you don't drink or use drugs, you may feel and sleep better. You may get along better with your family, friends, and coworkers. There are medicines and programs that can help with substance use disorder.  How can you care for yourself at home?  Here are some ways to help you stay sober and prevent relapse.  If you have been given medicine to help keep you sober or reduce your cravings, be sure to take it exactly as prescribed.  Talk to your doctor about programs that can help you stop using drugs or drinking alcohol.  Do not keep alcohol or drugs in your home.  Plan ahead. Think about what you'll say if other people ask you to drink or use drugs. Try not to spend time with people who drink or use drugs.  Use the time and money spent on drinking or drugs to do something that's important to you.  Preventing a relapse  Have a plan to deal with relapse. Learn to recognize changes in your thinking that lead you to drink or use drugs. Get help before you start to drink or use drugs again.  Try to stay away from situations, friends, or places that may lead you to drink or use drugs.  If you feel the need to drink alcohol or use drugs again, seek help right away. Call a trusted friend or family member. Some people get support from organizations such as Narcotics Anonymous or [employer] or from treatment facilities.  If you relapse, get help as soon as you can. Some people make a plan with another person that outlines what they want that person to do for them if they relapse. The plan usually includes how  to handle the relapse and who to notify in case of relapse.  Don't give up. Remember that a relapse doesn't mean that you have failed. Use the experience to learn the triggers that lead you to drink or use drugs. Then quit again. Recovery is a lifelong process. Many people have several relapses before they are able to quit for good.  Follow-up care is a key part of your treatment and safety. Be sure to make and go to all appointments, and call your doctor if you are having problems. It's also a good idea to know your test results and keep a list of the medicines you take.  When should you call for help?   Call 911  anytime you think you may need emergency care. For example, call if you or someone else:    Has overdosed or has withdrawal signs. Be sure to tell the emergency workers that you are or someone else is using or trying to quit using drugs. Overdose or withdrawal signs may include:  Losing consciousness.  Seizure.  Seeing or hearing things that aren't there (hallucinations).     Is thinking or talking about suicide or harming others.   Where to get help 24 hours a day, 7 days a week   If you or someone you know talks about suicide, self-harm, a mental health crisis, a substance use crisis, or any other kind of emotional distress, get help right away. You can:    Call the Suicide and Crisis Lifeline at 028.     Call 2-016-250-TALK (1-150.756.4567).     Text HOME to 545951 to access the Crisis Text Line.   Consider saving these numbers in your phone.  Go to [employer] for more information or to chat online.  Call your doctor now or seek immediate medical care if:    You are having withdrawal symptoms. These may include nausea or vomiting, sweating, shakiness, and anxiety.   Watch closely for changes in your health, and be sure to contact your doctor if:    You have a relapse.     You need more help or support to stop.   Where can you learn more?  Go to https://www.healthwise.net/patiented  Enter H573 in  "the search box to learn more about \"Substance Use Disorder: Care Instructions.\"  Current as of: November 15, 2023  Content Version: 14.2 2024 [facility] [employer], [facility].   Care instructions adapted under license by your healthcare professional. If you have questions about a medical condition or this instruction, always ask your healthcare professional. Healthwise[employer] disclaims any warranty or liability for your use of this information.       "

## 2024-11-10 NOTE — ED NOTES
Symptoms improved with IV Compazine, says last emesis was at 0500. PO challenge started after the administration of medication as was still felling nauseated. Pt able to keep fluids christina, comfortable discharging, mom transporting and assisting as needed.

## 2024-11-10 NOTE — ED PROVIDER NOTES
"  Emergency Department Note      History of Present Illness     Chief Complaint   Nausea & Vomiting      HPI   Joseph R Barthel is a 39 year old mal who presents emergency department further evaluation of nausea and vomiting.  Past medical history includes Crohn's disease and generalized anxiety disorder.  Patient reports that his symptoms started yesterday at approximately 8:30 PM.  Since then he has been unable to keep any food or liquids down.  Denies any recent fevers, chills.  He states that he does have some diarrhea.  He denies any urinary symptoms.  He denies any drug use.  He states that he has had abdominal pain associated with nausea and vomiting in the past related to his Crohn's disease, however this feels different when compared to his Crohn's flares.  He states that he has an active referral in place to gastroenterology however has not called them yet.  He states that his symptoms might have been associated with a panic attack.    Independent Historian   None    Review of External Notes   None    Past Medical History     Medical History and Problem List   No past medical history on file.    Medications   emtricitabine-tenofovir (TRUVADA) 200-300 MG per tablet  minoxidil (LONITEN) 2.5 MG tablet  ondansetron (ZOFRAN ODT) 4 MG ODT tab  promethazine (PHENERGAN) 25 MG tablet  sertraline (ZOLOFT) 100 MG tablet  traZODone (DESYREL) 100 MG tablet        Surgical History   No past surgical history on file.    Physical Exam     Patient Vitals for the past 24 hrs:   BP Temp Temp src Pulse Resp SpO2 Height Weight   11/10/24 0447 108/83 97.8  F (36.6  C) Temporal 104 22 100 % 1.702 m (5' 7\") 52.2 kg (115 lb)     Physical Exam  General: Awake, alert, non-toxic.  Head:  Scalp is NC/AT  Eyes:  Conjunctiva normal, PERRL  ENT:  The external nose and ears are normal.     Oropharynx clear, uvula midline.  Neck:  Normal range of motion without rigidity.  CV:  Regular rate and rhythm    No pathologic murmur, rubs, or " gallops.  Resp:  Breath sounds are clear bilaterally    Non-labored, no retractions or accessory muscle use  Abdomen: Abdomen is soft, no distension, no masses. Tenderness to palpation on RUQ, RLQ, and LUQ. No CVA tenderness.  MS:  No lower extremity edema/swelling. No midline cervical, thoracic, or lumbar tenderness.  Extremities without joint swelling or redness.  Skin:  Warm and dry, No rash or lesions noted.  Neuro:  Alert and oriented.  GCS 15 Moves all extremities normal.  No facial asymmetry. Gait normal.  Psych: Awake. Alert. Normal affect. Appropriate interactions.     Diagnostics     Lab Results   Labs Ordered and Resulted from Time of ED Arrival to Time of ED Departure   COMPREHENSIVE METABOLIC PANEL - Abnormal       Result Value    Sodium 140      Potassium 4.2      Carbon Dioxide (CO2) 20 (*)     Anion Gap 17 (*)     Urea Nitrogen 13.8      Creatinine 0.98      GFR Estimate >90      Calcium 9.1      Chloride 103      Glucose 137 (*)     Alkaline Phosphatase 80      AST 18      ALT 11      Protein Total 7.2      Albumin 4.1      Bilirubin Total 0.8     CBC WITH PLATELETS AND DIFFERENTIAL - Abnormal    WBC Count 12.8 (*)     RBC Count 4.84      Hemoglobin 10.7 (*)     Hematocrit 35.4 (*)     MCV 73 (*)     MCH 22.1 (*)     MCHC 30.2 (*)     RDW 19.4 (*)     Platelet Count 514 (*)     % Neutrophils 87      % Lymphocytes 5      % Monocytes 6      % Eosinophils 1      % Basophils 1      % Immature Granulocytes 0      NRBCs per 100 WBC 0      Absolute Neutrophils 11.2 (*)     Absolute Lymphocytes 0.6 (*)     Absolute Monocytes 0.8      Absolute Eosinophils 0.1      Absolute Basophils 0.1      Absolute Immature Granulocytes 0.1      Absolute NRBCs 0.0     ERYTHROCYTE SEDIMENTATION RATE AUTO - Abnormal    Erythrocyte Sedimentation Rate 16 (*)    LIPASE - Normal    Lipase 21     MAGNESIUM - Normal    Magnesium 2.0     CRP INFLAMMATION - Normal    CRP Inflammation <3.00         Imaging   CT Abdomen Pelvis w  Contrast   Final Result   IMPRESSION:    1.  Status post colectomy. Underdistention versus wall thickening of distal small bowel and ileal pouch. Enteritis not excluded.   2.  No bowel obstruction or abscess.              Independent Interpretation   None    ED Course      Medications Administered   Medications   ondansetron (ZOFRAN) injection 4 mg (4 mg Intravenous $Given 11/10/24 0454)   sodium chloride 0.9% BOLUS 1,000 mL (0 mLs Intravenous Stopped 11/10/24 0529)   HYDROmorphone (PF) (DILAUDID) injection 0.5 mg (0.5 mg Intravenous $Given 11/10/24 0522)   ketorolac (TORADOL) injection 15 mg (15 mg Intravenous $Given 11/10/24 0524)   sodium chloride (PF) 0.9% PF flush 100 mL (54 mLs Intravenous $Given 11/10/24 0553)   iopamidol (ISOVUE-370) solution 500 mL (58 mLs Intravenous $Given 11/10/24 0553)       Procedures   Procedures     Discussion of Management   None    ED Course        Additional Documentation  None    Medical Decision Making / Diagnosis     CMS Diagnoses: None    MIPS       None    [facility]   Joseph R Barthel is a 39 year old male who presents emergency department for the evaluation of nausea, vomiting, and abdominal pain.  Past medical history includes Crohn's disease and generalized anxiety disorder.  Abdominal exam did reveal focal tenderness to the right upper quadrant, right lower quadrant, and left upper quadrant.  CT scan was obtained out of concern for cholecystitis, and appendicitis, diverticulitis or other advanced pathologies.  CT scan was negative for any acute findings.  I did discuss the sometimes vague initial constellation of symptoms early in the course of acute abdominal processes, and the need for immediate return should pain or other concerning symptoms develop.  Symptoms are improved after IV fluids and symptomatic treatment.  CBC did reveal a mild leukocytosis, I believe this is in the setting of vomiting and dehydration.  Patient was given 1 L of normal saline here in the emergency  department.  Patient also received IV Zofran as well as Toradol for pain control.  Upon reexamination patient states that he feels better.  He also has an active referral to gastroenterology which I encouraged him to reach out to to schedule appointment. There has been no travel or antibiotic exposure to suggest more concerning cause of diarrhea, and there has been no hematemesis or BRBPR/melena.   I believe patient is safe for discharge in improved condition at this time with strict return precautions for recurrent vomiting, pain, fever or any other concerning symptoms.  All questions and concerns were addressed and patient verbalized understanding.  All lab values and radiological findings were discussed with the patient and patient verbalized understanding.  Red flag symptoms and return criteria were discussed and patient verbalized understanding.  Prescription for oral Phenergan for nausea was sent.  Patient was discharged.       Disposition   The patient was discharged.     Diagnosis     ICD-10-CM    1. Nausea and vomiting  R11.2            Discharge Medications   New Prescriptions    PROMETHAZINE (PHENERGAN) 25 MG TABLET    Take 1 tablet (25 mg) by mouth every 6 hours as needed for nausea.         ZAYDA Germain CNP, [place], APRN CNP  11/10/24 0703

## 2024-11-12 ENCOUNTER — TELEPHONE (OUTPATIENT)
Dept: GASTROENTEROLOGY | Facility: CLINIC | Age: 39
End: 2024-11-12
Payer: COMMERCIAL

## 2024-11-12 LAB
TESTOST FREE SERPL-MCNC: 6.6 NG/DL
TESTOST SERPL-MCNC: 541 NG/DL (ref 240–950)

## 2024-11-12 NOTE — TELEPHONE ENCOUNTER
[facility] Call Center    Phone Message    May a detailed message be left on voicemail: Yes    Reason for Call: Other: Patient is currently scheduled on 12/19, as visit type New IBD Urgent . This is outside the expected timeline for this referral. Patient has been added to the waitlist.      Action Taken: Message routed to:  Other: GI REFERRAL TRIAGE POOL     Travel Screening: Not Applicable

## 2024-11-19 ENCOUNTER — VIRTUAL VISIT (OUTPATIENT)
Dept: GASTROENTEROLOGY | Facility: CLINIC | Age: 39
End: 2024-11-19
Payer: COMMERCIAL

## 2024-11-19 VITALS — WEIGHT: 115 LBS | BODY MASS INDEX: 18.05 KG/M2 | HEIGHT: 67 IN

## 2024-11-19 DIAGNOSIS — R10.9 ABDOMINAL PAIN: ICD-10-CM

## 2024-11-19 DIAGNOSIS — K52.9 INFLAMMATORY BOWEL DISEASE: Primary | ICD-10-CM

## 2024-11-19 DIAGNOSIS — R11.10 CHRONIC VOMITING: ICD-10-CM

## 2024-11-19 PROCEDURE — 99207 PR NO CHARGE LOS: CPT | Mod: 95 | Performed by: DIETITIAN, REGISTERED

## 2024-11-19 ASSESSMENT — PAIN SCALES - GENERAL: PAINLEVEL_OUTOF10: NO PAIN (0)

## 2024-11-19 NOTE — NURSING NOTE
Current patient location: Patient declined to provide     Is the patient currently in the state of [place]? YES    Visit mode:VIDEO    If the visit is dropped, the patient can be reconnected by:VIDEO VISIT: Text to cell phone:   Telephone Information:   Mobile [phone]       Will anyone else be joining the visit? NO  (If patient encounters technical issues they should call [phone]    Are changes needed to the allergy or medication list? No    Are refills needed on medications prescribed by this physician? NO    Rooming Documentation:  Patient declined to complete questionnaire(s)    Reason for visit: Consult (Crohn's)    Ledy PICKARD

## 2024-11-19 NOTE — PATIENT INSTRUCTIONS
It was nice meeting you today. Below are the nutrition recommendations we discussed at your visit.    Please let me know if you have any additional questions.    Nutrition Recommendations    Aim for eating multiple smaller meals per day such as 4-6 smaller meals per day.     --For example you could eat a small breakfast, lunch, afternoon snack and small dinner meals to get 4 smaller meals per day.     Include some good sources of soluble fiber in your diet (which may be helpful in giving some form to your stools).    --here are some examples of foods that have some soluble fiber: oats, oatmeal, barley, avocado, sweet potato, potato, brussels sprouts, broccoli, oranges, apples, figs, benas and legumes (such as black beans, kidney beans and especially navy beans, connie seeds and flaxseeds (you mind better tolerated them ground or if you mix them into a food to soak for a little bit before eating them.     Drink at least 1 or more nutrition drinks per day. These drinks count as a smaller meals.     --Some ready to drink options include: Boost Plus or Original or Ensure Plus or Original or [employer] drinks.    --OR you can make your own homemade protein smoothies. To make your own smoothie you could use a protein powder and blend it with a lactose free milk or non dairy milk substitute and blend with some fruit and vegetables. OR you could use a lactose free milk such as [employer]life lactose free milk and blend with some fruit or with fruit and vegetables. (The [employer] lactose free milk has 13 grams of protein per cup, so this would give you some protein without having to add a protein powder into it.)    Here are some specific ideas we discussed for some smaller meals:    --For breakfast, you could have 1-2 oatmeal packets and add in some sliced banana. For some protein, you could add in some lactose free milk into it.    --Or for breakfast, or other small meal/snack, you could make overnight oats by adding about 1  tablespoon of oats in your yogurt cup (mixed together, recover and keep in the refrigerator overnight, then in the morning you could add fruit into the yogurt, oat mixture or have some fruit on the side.     --You could have nutrition drink for breakfast (or any meal/snack). For example if not eating much lunch, you could have the nutrition drink or if you ate a bigger lunch, then could have the nutrition drink for dinner instead of skipping this meal.     --I didn't mention this option at our visit, but you could make up some hard boiled eggs ahead of time and grab a couple to eat for breakfast (either at home or at work) along a banana or along with an oatmeal packet as a couple examples.    Avoid sugar alcohols which are sugar substitutes such as sorbitol, xylitol, mannitol, maltitol, erythritol, isomalt, lactitol. These can be found in some sugar free candies, drinks, gums and sugar free protein bar such as vitamin water zero, Calli drinks, some sugar free gums such as Trident and some protein bars such as Quest protein bars. (Double check that the vitamin water you drink occasionally doesn't have any sugar alcohols as this may lead to more diarrhea).    Wait at least 3 hours after eating before lying down and going to sleep.     Continue to drink at least 48 oz-64 oz or more water per day.    Follow up in 2-3 months.    For follow up appointments, please call [phone].    Thank you,    Tammi Chester, MS, RD, LD

## 2024-11-19 NOTE — LETTER
11/19/2024      Joseph R Barthel  [address]  [facility] [address]      Dear Colleague,    Thank you for referring your patient, Joseph R Barthel, to the [facility] [place]. Please see a copy of my visit note below.    Virtual Visit Details    Type of service:  Video Visit     Originating Location (pt. Location): Home  Distant Location (provider location):  Off-site  Platform used for Video Visit: [facility] Outpatient Medical Nutrition Therapy      Time Spent:  61 minutes  Session Type:  Initial   Referring Physician:  Gina Strong CNP  Reason for RD Visit:   Crohn's/IBD     Nutrition Assessment:  Patient is a 39 year old male with history that includes Crohn's disease with J pouch and recurrent vomiting, abdominal pain. He stated that he got his J pouch in 2000, but in 5150-0980, he started having issues with vomiting and abdominal pain which got better for a couple of years but then restarted more regularly again in 2022. He used to follow with GI in [facility] but moved to [place] last year. He met with his PCP and an RD at that time. He has an upcoming appt with GI on 12/19/24 as a new patient. He stated that he tends to have issues with vomiting and pain after eating. It could be about 4 hours after eating sometimes 2 hours after eating where he gets symptoms. He may go to bed and wake up about 11 PM-12 AM and have vomiting. If he eats a snack such as bagel with CC then he feels okay but if he eats more of a full meal tends to have issues with pain and vomiting. He stated that he doesn't have much nausea but will have vomiting unexpectedly. Sometimes he sees whole food in vomit and other times it's liquid or saliva. In general he tends to eat 2x/day. If he eats a bigger lunch then may not eat dinner or vice versa. He generally skips breakfast or may have something small such as bagel with CC or just a banana. He will have coffee about 5  "mornings per week. All of his BMs are watery, none have any form. Depending on where he is in the day, he has to be careful when he is eating. Based on conversation today for example, eating 6 times per day wouldn't work for him d/t issues with watery stools and other symptoms as well. He has been losing weight and his UBW is 135 lbs, in Sept 2024, his weight was 125 lbs and now at recent ED visit last week, he was 115 lbs.    Height:   Ht Readings from Last 1 Encounters:   11/19/24 1.702 m (5' 7\")   ]  Weight: his UBW was 135 lbs last year. Down 10 lbs over the past 2 months and 20 lbs over the past year. BMI of 18.01, underweight.  Wt Readings from Last 10 Encounters:   11/19/24 52.2 kg (115 lb)   11/10/24 52.2 kg (115 lb)   11/08/24 51.7 kg (114 lb)   10/30/24 53.1 kg (117 lb 1 oz)   10/30/24 53.8 kg (118 lb 9.6 oz)   09/23/24 56.7 kg (125 lb)     BMI: Estimated body mass index is 18.01 kg/m  as calculated from the following:    Height as of this encounter: 1.702 m (5' 7\").    Weight as of this encounter: 52.2 kg (115 lb).    Diet Recall:  (some usual/recent meals/snacks/beverages):if eats a bigger lunch then doesn't eat dinner. Eats about 2x/day. \"Goes in spurts\" of meals/foods he has/eats. He doesn't tolerate grape or apple juice or [employer] coffee drinks as these go right through him and cause more rapid watery stools.    Meal Food    Breakfast Skips or today had a bagel and cream cheese   Lunch Bagel with cream cheese or gets lunch at work: chicken caesar salad or chicken sandwich or at cafe pizza or potatoes, cucumbers or bagel turkey/ham sandwich or earlier in autumn was making jam sandwiches   Dinner Soup or other meal he cooks (meat/eggs, starch, veg) or last night 4 sl frozen pizza or skips if ate meal at work.   Snacks Occas at night but every night, only if hungry, chips or candy/chocolates or hard candies   Beverages ~5 AM/week black Coffee, 1 can every other day squirt/strawberry lemon soda, occas " vitamin water, ~48 oz or more water, last night hot chocolate but not daily   Alcohol Intake ~couple times per month may have Wine or beer 2 drinks but over this past weekend has 5 beeris with visitors      Labs:    Last Comprehensive Metabolic Panel:  Sodium   Date Value Ref Range Status   11/10/2024 140 135 - 145 mmol/L Final     Potassium   Date Value Ref Range Status   11/10/2024 4.2 3.4 - 5.3 mmol/L Final     Chloride   Date Value Ref Range Status   11/10/2024 103 98 - 107 mmol/L Final     Carbon Dioxide (CO2)   Date Value Ref Range Status   11/10/2024 20 (L) 22 - 29 mmol/L Final     Anion Gap   Date Value Ref Range Status   11/10/2024 17 (H) 7 - 15 mmol/L Final     Glucose   Date Value Ref Range Status   11/10/2024 137 (H) 70 - 99 mg/dL Final     Urea Nitrogen   Date Value Ref Range Status   11/10/2024 13.8 6.0 - 20.0 mg/dL Final     Creatinine   Date Value Ref Range Status   11/10/2024 0.98 0.67 - 1.17 mg/dL Final     GFR Estimate   Date Value Ref Range Status   11/10/2024 >90 >60 mL/min/1.73m2 Final     Comment:     eGFR calculated using 2021 CKD-EPI equation.     Calcium   Date Value Ref Range Status   11/10/2024 9.1 8.8 - 10.4 mg/dL Final     Comment:     Reference intervals for this test were updated on 7/16/2024 to reflect our healthy population more accurately. There may be differences in the flagging of prior results with similar values performed with this method. Those prior results can be interpreted in the context of the updated reference intervals.     CBC RESULTS:   Recent Labs   Lab Test 11/10/24  0453   WBC 12.8*   RBC 4.84   HGB 10.7*   HCT 35.4*   MCV 73*   MCH 22.1*   MCHC 30.2*   RDW 19.4*   *       Pertinent Medications/vitamin and mineral supplements:      Current Outpatient Medications   Medication Sig Dispense Refill     emtricitabine-tenofovir (TRUVADA) 200-300 MG per tablet Take 1 tablet by mouth daily. 90 tablet 2     minoxidil (LONITEN) 2.5 MG tablet Take 1 tablet (2.5 mg) by  mouth daily. 30 tablet 11     ondansetron (ZOFRAN ODT) 4 MG ODT tab Take 1 tablet (4 mg) by mouth every 8 hours as needed. 4 tablet 0     promethazine (PHENERGAN) 25 MG tablet Take 1 tablet (25 mg) by mouth every 6 hours as needed for nausea. (Patient not taking: Reported on 11/19/2024) 30 tablet 0     sertraline (ZOLOFT) 100 MG tablet Take 100 mg by mouth daily.       traZODone (DESYREL) 100 MG tablet Take 100 mg by mouth at bedtime. (Patient not taking: Reported on 11/19/2024)       No current facility-administered medications for this visit.       Food Allergies:  NKFA     MALNUTRITION:  % Weight Loss:  > 7.5% in 3 months (severe malnutrition)  % Intake:  </= 75% for >/= 1 month (severe malnutrition)  Subcutaneous Fat Loss:  None observed  Muscle Loss:  None observed  Fluid Retention:  None noted    Malnutrition Diagnosis: Severe malnutrition  In Context of:  Chronic illness or disease    Nutrition Prescription: Nutrition Education     Nutrition Intervention      Provided diet education for Crohn's, J pouch, unintentional weight loss, vomiting and abdominal pain. See recs below.     Answered patient's questions. Patient verbalized understanding of education provided. See Goals below.     Goals:        Aim for eating multiple smaller meals per day such as 4-6 smaller meals per day.     --For example you could eat a small breakfast, lunch, afternoon snack and small dinner meals to get 4 smaller meals per day.     Include some good sources of soluble fiber in your diet (which may be helpful in giving some form to your stools).    --here are some examples of foods that have some soluble fiber: oats, oatmeal, barley, avocado, sweet potato, potato, brussels sprouts, broccoli, oranges, apples, figs, benas and legumes (such as black beans, kidney beans and especially navy beans, connie seeds and flaxseeds (you mind better tolerated them ground or if you mix them into a food to soak for a little bit before eating them.      Drink at least 1 or more nutrition drinks per day. These drinks count as a smaller meals.     --Some ready to drink options include: Boost Plus or Original or Ensure Plus or Original or [employer] drinks.    --OR you can make your own homemade protein smoothies. To make your own smoothie you could use a protein powder and blend it with a lactose free milk or non dairy milk substitute and blend with some fruit and vegetables. OR you could use a lactose free milk such as [employer] lactose free milk and blend with some fruit or with fruit and vegetables. (The [employer] lactose free milk has 13 grams of protein per cup, so this would give you some protein without having to add a protein powder into it.)    Here are some specific ideas we discussed for some smaller meals:    --For breakfast, you could have 1-2 oatmeal packets and add in some sliced banana. For some protein, you could add in some lactose free milk into it.    --Or for breakfast, or other small meal/snack, you could make overnight oats by adding about 1 tablespoon of oats in your yogurt cup (mixed together, recover and keep in the refrigerator overnight, then in the morning you could add fruit into the yogurt, oat mixture or have some fruit on the side.     --You could have nutrition drink for breakfast (or any meal/snack). For example if not eating much lunch, you could have the nutrition drink or if you ate a bigger lunch, then could have the nutrition drink for dinner instead of skipping this meal.     --I didn't mention this option at our visit, but you could make up some hard boiled eggs ahead of time and grab a couple to eat for breakfast (either at home or at work) along a banana or along with an oatmeal packet as a couple examples.    Avoid sugar alcohols which are sugar substitutes such as sorbitol, xylitol, mannitol, maltitol, erythritol, isomalt, lactitol. These can be found in some sugar free candies, drinks, gums and sugar free protein bar  such as vitamin water zero, Calli drinks, some sugar free gums such as Trident and some protein bars such as Quest protein bars. (Double check that the vitamin water you drink occasionally doesn't have any sugar alcohols as this may lead to more diarrhea).    Wait at least 3 hours after eating before lying down and going to sleep.     Continue to drink at least 48 oz-64 oz or more water per day.    Nutrition Monitoring and Evaluation: Will monitor adherence to nutrition recommendations at future RD visits.     Further Medical Nutrition Therapy:  Follow-up in 2-3 months. He will plan to f/up in the next 2-3 months after meeting with gastroenterologist and any further testing he may get.    Patient was encouraged to contact RD with any further questions.    Tammi Chester, MS, RD, LD        Again, thank you for allowing me to participate in the care of your patient.        Sincerely,        Tammi Chester RD

## 2024-11-19 NOTE — PROGRESS NOTES
Virtual Visit Details    Type of service:  Video Visit     Originating Location (pt. Location): Home  Distant Location (provider location):  Off-site  Platform used for Video Visit: [facility] Outpatient Medical Nutrition Therapy      Time Spent:  61 minutes  Session Type:  Initial   Referring Physician:  Gina Strong CNP  Reason for RD Visit:   Crohn's/IBD     Nutrition Assessment:  Patient is a 39 year old male with history that includes Crohn's disease with J pouch and recurrent vomiting, abdominal pain. He stated that he got his J pouch in 2000, but in [id number], he started having issues with vomiting and abdominal pain which got better for a couple of years but then restarted more regularly again in 2022. He used to follow with GI in [facility] but moved to [place] last year. He met with his PCP and an RD at that time. He has an upcoming appt with GI on 12/19/24 as a new patient. He stated that he tends to have issues with vomiting and pain after eating. It could be about 4 hours after eating sometimes 2 hours after eating where he gets symptoms. He may go to bed and wake up about 11 PM-12 AM and have vomiting. If he eats a snack such as bagel with CC then he feels okay but if he eats more of a full meal tends to have issues with pain and vomiting. He stated that he doesn't have much nausea but will have vomiting unexpectedly. Sometimes he sees whole food in vomit and other times it's liquid or saliva. In general he tends to eat 2x/day. If he eats a bigger lunch then may not eat dinner or vice versa. He generally skips breakfast or may have something small such as bagel with CC or just a banana. He will have coffee about 5 mornings per week. All of his BMs are watery, none have any form. Depending on where he is in the day, he has to be careful when he is eating. Based on conversation today for example, eating 6 times per day wouldn't work for him d/t issues with watery stools and  "other symptoms as well. He has been losing weight and his UBW is 135 lbs, in Sept 2024, his weight was 125 lbs and now at recent ED visit last week, he was 115 lbs.    Height:   Ht Readings from Last 1 Encounters:   11/19/24 1.702 m (5' 7\")   ]  Weight: his UBW was 135 lbs last year. Down 10 lbs over the past 2 months and 20 lbs over the past year. BMI of 18.01, underweight.  Wt Readings from Last 10 Encounters:   11/19/24 52.2 kg (115 lb)   11/10/24 52.2 kg (115 lb)   11/08/24 51.7 kg (114 lb)   10/30/24 53.1 kg (117 lb 1 oz)   10/30/24 53.8 kg (118 lb 9.6 oz)   09/23/24 56.7 kg (125 lb)     BMI: Estimated body mass index is 18.01 kg/m  as calculated from the following:    Height as of this encounter: 1.702 m (5' 7\").    Weight as of this encounter: 52.2 kg (115 lb).    Diet Recall:  (some usual/recent meals/snacks/beverages):if eats a bigger lunch then doesn't eat dinner. Eats about 2x/day. \"Goes in spurts\" of meals/foods he has/eats. He doesn't tolerate grape or apple juice or [employer] coffee drinks as these go right through him and cause more rapid watery stools.    Meal Food    Breakfast Skips or today had a bagel and cream cheese   Lunch Bagel with cream cheese or gets lunch at work: chicken caesar salad or chicken sandwich or at cafe pizza or potatoes, cucumbers or bagel turkey/ham sandwich or earlier in autumn was making jam sandwiches   Dinner Soup or other meal he cooks (meat/eggs, starch, veg) or last night 4 sl frozen pizza or skips if ate meal at work.   Snacks Occas at night but every night, only if hungry, chips or candy/chocolates or hard candies   Beverages ~5 AM/week black Coffee, 1 can every other day squirt/strawberry lemon soda, occas vitamin water, ~48 oz or more water, last night hot chocolate but not daily   Alcohol Intake ~couple times per month may have Wine or beer 2 drinks but over this past weekend has 5 beeris with visitors      Labs:    Last Comprehensive Metabolic Panel:  Sodium "   Date Value Ref Range Status   11/10/2024 140 135 - 145 mmol/L Final     Potassium   Date Value Ref Range Status   11/10/2024 4.2 3.4 - 5.3 mmol/L Final     Chloride   Date Value Ref Range Status   11/10/2024 103 98 - 107 mmol/L Final     Carbon Dioxide (CO2)   Date Value Ref Range Status   11/10/2024 20 (L) 22 - 29 mmol/L Final     Anion Gap   Date Value Ref Range Status   11/10/2024 17 (H) 7 - 15 mmol/L Final     Glucose   Date Value Ref Range Status   11/10/2024 137 (H) 70 - 99 mg/dL Final     Urea Nitrogen   Date Value Ref Range Status   11/10/2024 13.8 6.0 - 20.0 mg/dL Final     Creatinine   Date Value Ref Range Status   11/10/2024 0.98 0.67 - 1.17 mg/dL Final     GFR Estimate   Date Value Ref Range Status   11/10/2024 >90 >60 mL/min/1.73m2 Final     Comment:     eGFR calculated using 2021 CKD-EPI equation.     Calcium   Date Value Ref Range Status   11/10/2024 9.1 8.8 - 10.4 mg/dL Final     Comment:     Reference intervals for this test were updated on 7/16/2024 to reflect our healthy population more accurately. There may be differences in the flagging of prior results with similar values performed with this method. Those prior results can be interpreted in the context of the updated reference intervals.     CBC RESULTS:   Recent Labs   Lab Test 11/10/24  0453   WBC 12.8*   RBC 4.84   HGB 10.7*   HCT 35.4*   MCV 73*   MCH 22.1*   MCHC 30.2*   RDW 19.4*   *       Pertinent Medications/vitamin and mineral supplements:      Current Outpatient Medications   Medication Sig Dispense Refill    emtricitabine-tenofovir (TRUVADA) 200-300 MG per tablet Take 1 tablet by mouth daily. 90 tablet 2    minoxidil (LONITEN) 2.5 MG tablet Take 1 tablet (2.5 mg) by mouth daily. 30 tablet 11    ondansetron (ZOFRAN ODT) 4 MG ODT tab Take 1 tablet (4 mg) by mouth every 8 hours as needed. 4 tablet 0    promethazine (PHENERGAN) 25 MG tablet Take 1 tablet (25 mg) by mouth every 6 hours as needed for nausea. (Patient not taking:  Reported on 11/19/2024) 30 tablet 0    sertraline (ZOLOFT) 100 MG tablet Take 100 mg by mouth daily.      traZODone (DESYREL) 100 MG tablet Take 100 mg by mouth at bedtime. (Patient not taking: Reported on 11/19/2024)       No current facility-administered medications for this visit.       Food Allergies:  NKFA     MALNUTRITION:  % Weight Loss:  > 7.5% in 3 months (severe malnutrition)  % Intake:  </= 75% for >/= 1 month (severe malnutrition)  Subcutaneous Fat Loss:  None observed  Muscle Loss:  None observed  Fluid Retention:  None noted    Malnutrition Diagnosis: Severe malnutrition  In Context of:  Chronic illness or disease    Nutrition Prescription: Nutrition Education     Nutrition Intervention      Provided diet education for Crohn's, J pouch, unintentional weight loss, vomiting and abdominal pain. See recs below.     Answered patient's questions. Patient verbalized understanding of education provided. See Goals below.     Goals:        Aim for eating multiple smaller meals per day such as 4-6 smaller meals per day.     --For example you could eat a small breakfast, lunch, afternoon snack and small dinner meals to get 4 smaller meals per day.     Include some good sources of soluble fiber in your diet (which may be helpful in giving some form to your stools).    --here are some examples of foods that have some soluble fiber: oats, oatmeal, barley, avocado, sweet potato, potato, brussels sprouts, broccoli, oranges, apples, figs, benas and legumes (such as black beans, kidney beans and especially navy beans, connie seeds and flaxseeds (you mind better tolerated them ground or if you mix them into a food to soak for a little bit before eating them.     Drink at least 1 or more nutrition drinks per day. These drinks count as a smaller meals.     --Some ready to drink options include: Boost Plus or Original or Ensure Plus or Original or [employer] drinks.    --OR you can make your own homemade protein smoothies. To  make your own smoothie you could use a protein powder and blend it with a lactose free milk or non dairy milk substitute and blend with some fruit and vegetables. OR you could use a lactose free milk such as Fairlife lactose free milk and blend with some fruit or with fruit and vegetables. (The Fairlife lactose free milk has 13 grams of protein per cup, so this would give you some protein without having to add a protein powder into it.)    Here are some specific ideas we discussed for some smaller meals:    --For breakfast, you could have 1-2 oatmeal packets and add in some sliced banana. For some protein, you could add in some lactose free milk into it.    --Or for breakfast, or other small meal/snack, you could make overnight oats by adding about 1 tablespoon of oats in your yogurt cup (mixed together, recover and keep in the refrigerator overnight, then in the morning you could add fruit into the yogurt, oat mixture or have some fruit on the side.     --You could have nutrition drink for breakfast (or any meal/snack). For example if not eating much lunch, you could have the nutrition drink or if you ate a bigger lunch, then could have the nutrition drink for dinner instead of skipping this meal.     --I didn't mention this option at our visit, but you could make up some hard boiled eggs ahead of time and grab a couple to eat for breakfast (either at home or at work) along a banana or along with an oatmeal packet as a couple examples.    Avoid sugar alcohols which are sugar substitutes such as sorbitol, xylitol, mannitol, maltitol, erythritol, isomalt, lactitol. These can be found in some sugar free candies, drinks, gums and sugar free protein bar such as vitamin water zero, Calli drinks, some sugar free gums such as Trident and some protein bars such as Quest protein bars. (Double check that the vitamin water you drink occasionally doesn't have any sugar alcohols as this may lead to more diarrhea).    Wait at least  3 hours after eating before lying down and going to sleep.     Continue to drink at least 48 oz-64 oz or more water per day.    Nutrition Monitoring and Evaluation: Will monitor adherence to nutrition recommendations at future RD visits.     Further Medical Nutrition Therapy:  Follow-up in 2-3 months. He will plan to f/up in the next 2-3 months after meeting with gastroenterologist and any further testing he may get.    Patient was encouraged to contact RD with any further questions.    Tammi Chester MS, RD, LD

## 2024-11-20 NOTE — TELEPHONE ENCOUNTER
Patient is now scheduled within the appropriate time frame of one month for urgent triaged referrals. No rescheduling is needed anymore.    Scheduled with Gastroenterology (Jam David MD)  12/19/2024 at 4:20 PM     Closing encounter.      Maria G Saunders [facility]

## 2024-12-19 ENCOUNTER — LAB (OUTPATIENT)
Dept: LAB | Facility: CLINIC | Age: 39
End: 2024-12-19
Payer: COMMERCIAL

## 2024-12-19 ENCOUNTER — PATIENT OUTREACH (OUTPATIENT)
Dept: GASTROENTEROLOGY | Facility: CLINIC | Age: 39
End: 2024-12-19

## 2024-12-19 DIAGNOSIS — K50.119 CROHN'S DISEASE OF LARGE INTESTINE WITH COMPLICATION (H): ICD-10-CM

## 2024-12-19 LAB
HBV CORE AB SERPL QL IA: NONREACTIVE
HBV SURFACE AB SERPL IA-ACNC: <3.5 M[IU]/ML
HBV SURFACE AB SERPL IA-ACNC: NONREACTIVE M[IU]/ML
HBV SURFACE AG SERPL QL IA: NONREACTIVE
HCV AB SERPL QL IA: NONREACTIVE

## 2024-12-19 PROCEDURE — 86706 HEP B SURFACE ANTIBODY: CPT | Performed by: INTERNAL MEDICINE

## 2024-12-19 PROCEDURE — 86704 HEP B CORE ANTIBODY TOTAL: CPT | Performed by: INTERNAL MEDICINE

## 2024-12-19 PROCEDURE — 87340 HEPATITIS B SURFACE AG IA: CPT | Performed by: INTERNAL MEDICINE

## 2024-12-19 PROCEDURE — 84433 ASY THIOPURIN S-MTHYLTRNSFRS: CPT | Mod: 90 | Performed by: PATHOLOGY

## 2024-12-19 PROCEDURE — 82784 ASSAY IGA/IGD/IGG/IGM EACH: CPT | Performed by: INTERNAL MEDICINE

## 2024-12-19 PROCEDURE — 82607 VITAMIN B-12: CPT | Performed by: INTERNAL MEDICINE

## 2024-12-19 PROCEDURE — 86481 TB AG RESPONSE T-CELL SUSP: CPT | Performed by: INTERNAL MEDICINE

## 2024-12-19 PROCEDURE — 36415 COLL VENOUS BLD VENIPUNCTURE: CPT | Performed by: PATHOLOGY

## 2024-12-19 PROCEDURE — 99000 SPECIMEN HANDLING OFFICE-LAB: CPT | Performed by: PATHOLOGY

## 2024-12-19 PROCEDURE — 86803 HEPATITIS C AB TEST: CPT | Performed by: INTERNAL MEDICINE

## 2024-12-19 PROCEDURE — 86364 TISS TRNSGLTMNASE EA IG CLAS: CPT | Performed by: INTERNAL MEDICINE

## 2024-12-19 NOTE — TELEPHONE ENCOUNTER
Attempted to contact the patient to offer an earlier appointment slot this afternoon, however no answer. Left detailed voicemail requesting callback. [employer] message also sent.

## 2024-12-20 ENCOUNTER — MYC MEDICAL ADVICE (OUTPATIENT)
Dept: GASTROENTEROLOGY | Facility: CLINIC | Age: 39
End: 2024-12-20
Payer: COMMERCIAL

## 2024-12-20 LAB
GAMMA INTERFERON BACKGROUND BLD IA-ACNC: 0 IU/ML
IGA SERPL-MCNC: 355 MG/DL (ref 84–499)
M TB IFN-G BLD-IMP: NEGATIVE
M TB IFN-G CD4+ BCKGRND COR BLD-ACNC: 10 IU/ML
MITOGEN IGNF BCKGRD COR BLD-ACNC: 0 IU/ML
MITOGEN IGNF BCKGRD COR BLD-ACNC: 0 IU/ML
QUANTIFERON MITOGEN: 10 IU/ML
QUANTIFERON NIL TUBE: 0 IU/ML
QUANTIFERON TB1 TUBE: 0 IU/ML
QUANTIFERON TB2 TUBE: 0
TTG IGA SER-ACNC: 0.7 U/ML
TTG IGG SER-ACNC: <0.6 U/ML
VIT B12 SERPL-MCNC: 322 PG/ML (ref 232–1245)

## 2024-12-24 LAB — TPMT BLD-CCNC: 33.3 U/ML

## 2024-12-24 NOTE — TELEPHONE ENCOUNTER
Contacted patient as next steps were not yet scheduled, and mychart message unread. Patient expressed understanding and reports he has access to his mychart, but has not yet had time to review the messages.     Reports he will review next steps and get them scheduled later this week.

## 2025-01-02 ENCOUNTER — TELEPHONE (OUTPATIENT)
Dept: GASTROENTEROLOGY | Facility: CLINIC | Age: 40
End: 2025-01-02
Payer: COMMERCIAL

## 2025-01-02 NOTE — TELEPHONE ENCOUNTER
CHEY link sent to the patient.    ----- Message from Anastasia RICHARDS sent at 1/2/2025  1:31 PM CST -----  Regarding: Records  Good afternoon,    Can we please request records from [facility] in [place]?    We are mainly interested in the iron infusion records.     Thanks!  Davion

## 2025-01-06 ENCOUNTER — PATIENT OUTREACH (OUTPATIENT)
Dept: GASTROENTEROLOGY | Facility: CLINIC | Age: 40
End: 2025-01-06
Payer: COMMERCIAL

## 2025-01-06 ENCOUNTER — DOCUMENTATION ONLY (OUTPATIENT)
Dept: GASTROENTEROLOGY | Facility: CLINIC | Age: 40
End: 2025-01-06
Payer: COMMERCIAL

## 2025-01-06 DIAGNOSIS — K50.119 CROHN'S DISEASE OF LARGE INTESTINE WITH COMPLICATION (H): Primary | ICD-10-CM

## 2025-01-06 NOTE — PROGRESS NOTES
Faxed request for medical records to [facility] , fax # [phone].     PH: [phone]  Fax: [phone]      Adela OCONNOR MA

## 2025-01-06 NOTE — TELEPHONE ENCOUNTER
Per Dr. David -    Frandy iron studies and CBC in February 2025.    Orders placed; patient updated via [employer].

## 2025-01-09 ENCOUNTER — DOCUMENTATION ONLY (OUTPATIENT)
Dept: GASTROENTEROLOGY | Facility: CLINIC | Age: 40
End: 2025-01-09
Payer: COMMERCIAL

## 2025-01-09 NOTE — PROGRESS NOTES
Called [facility] for request on records and sent fax .      Adela OCONNOR MA

## 2025-01-10 ENCOUNTER — TRANSFERRED RECORDS (OUTPATIENT)
Dept: HEALTH INFORMATION MANAGEMENT | Facility: CLINIC | Age: 40
End: 2025-01-10
Payer: COMMERCIAL

## 2025-01-28 ENCOUNTER — HOSPITAL ENCOUNTER (OUTPATIENT)
Dept: MRI IMAGING | Facility: CLINIC | Age: 40
Discharge: HOME | End: 2025-01-28
Attending: INTERNAL MEDICINE
Payer: COMMERCIAL

## 2025-01-28 DIAGNOSIS — K50.119 CROHN'S DISEASE OF LARGE INTESTINE WITH COMPLICATION (H): ICD-10-CM

## 2025-01-28 PROCEDURE — A9585 GADOBUTROL INJECTION: HCPCS | Performed by: INTERNAL MEDICINE

## 2025-01-28 PROCEDURE — 255N000002 HC RX 255 OP 636: Performed by: INTERNAL MEDICINE

## 2025-01-28 PROCEDURE — 72197 MRI PELVIS W/O & W/DYE: CPT

## 2025-01-28 PROCEDURE — 250N000011 HC RX IP 250 OP 636: Mod: JZ | Performed by: INTERNAL MEDICINE

## 2025-01-28 PROCEDURE — 74183 MRI ABD W/O CNTR FLWD CNTR: CPT

## 2025-01-28 RX ORDER — GADOBUTROL 604.72 MG/ML
5 INJECTION INTRAVENOUS ONCE
Status: COMPLETED | OUTPATIENT
Start: 2025-01-28 | End: 2025-01-28

## 2025-01-28 RX ADMIN — GLUCAGON 1 MG: 1 INJECTION, POWDER, LYOPHILIZED, FOR SOLUTION INTRAMUSCULAR; INTRAVENOUS at 12:29

## 2025-01-28 RX ADMIN — GADOBUTROL 5 ML: 604.72 INJECTION INTRAVENOUS at 12:28

## 2025-01-30 ENCOUNTER — TELEPHONE (OUTPATIENT)
Dept: GASTROENTEROLOGY | Facility: CLINIC | Age: 40
End: 2025-01-30
Payer: COMMERCIAL

## 2025-01-30 NOTE — TELEPHONE ENCOUNTER
Pre visit planning completed.      Procedure details:    Patient scheduled for  EGD with pouchoscopy  on 2/13/25.     Arrival time: 1215. Procedure time 1315    Facility location: [facility]; [address] [place] [address], [address] Floor, [place] [address]. Check in location: 5th Floor.    Sedation type: MAC    Pre op exam needed? No.    Indication for procedure: crohns, vomiting      Chart review:     Electronic implanted devices? No    Recent diagnosis of diverticulitis within the last 6 weeks? No      Medication review:    Diabetic? No    Anticoagulants? No    Weight loss medication/injectable? No GLP-1 medication per patient's medication list. Nursing to verify with pre-assessment call.    Other medication HOLDING recommendations:  N/A      Prep for procedure:     Bowel prep recommendation: Enemas  Due to: standard bowel prep    Procedure information and instructions sent via bakari Gage RN  Endoscopy Procedure Pre Assessment   [phone] option 2

## 2025-01-30 NOTE — TELEPHONE ENCOUNTER
Pre assessment completed for upcoming procedure.      Procedure details:    Patient scheduled for  EGD with pouchoscopy  on 2/13/25.     Arrival time: 1215. Procedure time 1315     Facility location: [facility]; [address] [place] [address], [address] Floor, [place] [address]. Check in location: 5th Floor.     Sedation type: MAC     Pre op exam needed? No.     Indication for procedure: crohns, vomiting        Chart review:      Electronic implanted devices? No     Recent diagnosis of diverticulitis within the last 6 weeks? No    Designated  policy reviewed. Instructed to have someone stay 24 hours post procedure.       Chart review:     Electronic implanted devices? No    Recent diagnosis of diverticulitis within the last 6 weeks?  No      Medication review:    Diabetic? No    Anticoagulants? No    Weight loss medication/injectable? No.    Other medication HOLDING recommendations:  N/A      Prep for procedure:     Bowel prep recommendation: Enemas  Due to: standard bowel prep    Patient verbalized understanding and had no questions or concerns at this time.        Constance Edouard LPN  Endoscopy Procedure Pre Assessment   [phone] option 2

## 2025-02-12 ENCOUNTER — ANESTHESIA EVENT (OUTPATIENT)
Dept: SURGERY | Facility: AMBULATORY SURGERY CENTER | Age: 40
End: 2025-02-12
Payer: COMMERCIAL

## 2025-02-13 ENCOUNTER — LAB (OUTPATIENT)
Dept: LAB | Facility: CLINIC | Age: 40
End: 2025-02-13
Payer: COMMERCIAL

## 2025-02-13 ENCOUNTER — ANESTHESIA (OUTPATIENT)
Dept: SURGERY | Facility: AMBULATORY SURGERY CENTER | Age: 40
End: 2025-02-13
Payer: COMMERCIAL

## 2025-02-13 VITALS — HEART RATE: 54 BPM

## 2025-02-13 DIAGNOSIS — K50.119 CROHN'S DISEASE OF LARGE INTESTINE WITH COMPLICATION (H): ICD-10-CM

## 2025-02-13 LAB
BASOPHILS # BLD AUTO: 0 10E3/UL (ref 0–0.2)
BASOPHILS NFR BLD AUTO: 0 %
EOSINOPHIL # BLD AUTO: 0.8 10E3/UL (ref 0–0.7)
EOSINOPHIL NFR BLD AUTO: 9 %
ERYTHROCYTE [DISTWIDTH] IN BLOOD BY AUTOMATED COUNT: 16.4 % (ref 10–15)
FERRITIN SERPL-MCNC: 14 NG/ML (ref 31–409)
HCT VFR BLD AUTO: 34.9 % (ref 40–53)
HGB BLD-MCNC: 10.9 G/DL (ref 13.3–17.7)
IMM GRANULOCYTES # BLD: 0 10E3/UL
IMM GRANULOCYTES NFR BLD: 0 %
IRON BINDING CAPACITY (ROCHE): 355 UG/DL (ref 240–430)
IRON SATN MFR SERPL: 4 % (ref 15–46)
IRON SERPL-MCNC: 14 UG/DL (ref 61–157)
LYMPHOCYTES # BLD AUTO: 0.4 10E3/UL (ref 0.8–5.3)
LYMPHOCYTES NFR BLD AUTO: 5 %
MCH RBC QN AUTO: 25.3 PG (ref 26.5–33)
MCHC RBC AUTO-ENTMCNC: 31.2 G/DL (ref 31.5–36.5)
MCV RBC AUTO: 81 FL (ref 78–100)
MONOCYTES # BLD AUTO: 0.7 10E3/UL (ref 0–1.3)
MONOCYTES NFR BLD AUTO: 8 %
NEUTROPHILS # BLD AUTO: 6.9 10E3/UL (ref 1.6–8.3)
NEUTROPHILS NFR BLD AUTO: 78 %
NRBC # BLD AUTO: 0 10E3/UL
NRBC BLD AUTO-RTO: 0 /100
PLATELET # BLD AUTO: 367 10E3/UL (ref 150–450)
RBC # BLD AUTO: 4.31 10E6/UL (ref 4.4–5.9)
WBC # BLD AUTO: 8.9 10E3/UL (ref 4–11)

## 2025-02-13 PROCEDURE — 88342 IMHCHEM/IMCYTCHM 1ST ANTB: CPT | Mod: 26 | Performed by: STUDENT IN AN ORGANIZED HEALTH CARE EDUCATION/TRAINING PROGRAM

## 2025-02-13 PROCEDURE — 85025 COMPLETE CBC W/AUTO DIFF WBC: CPT | Performed by: PATHOLOGY

## 2025-02-13 PROCEDURE — 36415 COLL VENOUS BLD VENIPUNCTURE: CPT | Performed by: PATHOLOGY

## 2025-02-13 PROCEDURE — 82728 ASSAY OF FERRITIN: CPT | Performed by: PATHOLOGY

## 2025-02-13 PROCEDURE — 83550 IRON BINDING TEST: CPT | Performed by: PATHOLOGY

## 2025-02-13 PROCEDURE — 88305 TISSUE EXAM BY PATHOLOGIST: CPT | Mod: 26 | Performed by: STUDENT IN AN ORGANIZED HEALTH CARE EDUCATION/TRAINING PROGRAM

## 2025-02-13 PROCEDURE — 83540 ASSAY OF IRON: CPT | Performed by: PATHOLOGY

## 2025-02-13 PROCEDURE — 88305 TISSUE EXAM BY PATHOLOGIST: CPT | Mod: TC | Performed by: INTERNAL MEDICINE

## 2025-02-13 RX ORDER — LIDOCAINE HYDROCHLORIDE 20 MG/ML
INJECTION, SOLUTION INFILTRATION; PERINEURAL PRN
Status: DISCONTINUED | OUTPATIENT
Start: 2025-02-13 | End: 2025-02-13

## 2025-02-13 RX ORDER — PROPOFOL 10 MG/ML
INJECTION, EMULSION INTRAVENOUS PRN
Status: DISCONTINUED | OUTPATIENT
Start: 2025-02-13 | End: 2025-02-13

## 2025-02-13 RX ORDER — PROPOFOL 10 MG/ML
INJECTION, EMULSION INTRAVENOUS CONTINUOUS PRN
Status: DISCONTINUED | OUTPATIENT
Start: 2025-02-13 | End: 2025-02-13

## 2025-02-13 RX ADMIN — PROPOFOL 20 MG: 10 INJECTION, EMULSION INTRAVENOUS at 13:37

## 2025-02-13 RX ADMIN — SODIUM CHLORIDE, SODIUM LACTATE, POTASSIUM CHLORIDE, CALCIUM CHLORIDE: 600; 310; 30; 20 INJECTION, SOLUTION INTRAVENOUS at 12:18

## 2025-02-13 RX ADMIN — LIDOCAINE HYDROCHLORIDE 100 MG: 20 INJECTION, SOLUTION INFILTRATION; PERINEURAL at 13:33

## 2025-02-13 RX ADMIN — PROPOFOL 200 MCG/KG/MIN: 10 INJECTION, EMULSION INTRAVENOUS at 13:33

## 2025-02-13 RX ADMIN — PROPOFOL 30 MG: 10 INJECTION, EMULSION INTRAVENOUS at 13:42

## 2025-02-13 RX ADMIN — PROPOFOL 80 MG: 10 INJECTION, EMULSION INTRAVENOUS at 13:35

## 2025-02-13 NOTE — ANESTHESIA CARE TRANSFER NOTE
Patient: Joseph R Barthel    Procedure: Procedure(s):  Esophagoscopy, gastroscopy, duodenoscopy (EGD), biopsy  Pouchoscopy with biopsy       Diagnosis: Crohn's disease of large intestine with complication (H) [K50.119]  Diagnosis Additional Information: No value filed.    Anesthesia Type:   MAC     Note:    Oropharynx: oropharynx clear of all foreign objects and spontaneously breathing  Level of Consciousness: awake  Oxygen Supplementation: room air    Independent Airway: airway patency satisfactory and stable  Dentition: dentition unchanged  Vital Signs Stable: post-procedure vital signs reviewed and stable  Report to RN Given: handoff report given  Patient transferred to: Phase II  Comments: Uneventful transport   Report to RN  Exchanging well; color natl  Pt responds appropriately to command  IV patent  Lips/teeth/dentition as preop status  Questions answered      Handoff Report: Identifed the Patient, Identified the Reponsible Provider, Reviewed the pertinent medical history, Discussed the surgical course, Reviewed Intra-OP anesthesia mangement and issues during anesthesia, Set expectations for post-procedure period and Allowed opportunity for questions and acknowledgement of understanding      Vitals:  Vitals Value Taken Time   /75    Temp 97.8    Pulse 58    Resp 16    SpO2 9999 % 02/13/25 1435   Vitals shown include unfiled device data.    Electronically Signed By: ZAYDA RODRIGUEZ CRNA  February 13, 2025  2:36 PM

## 2025-02-13 NOTE — ANESTHESIA PREPROCEDURE EVALUATION
"Anesthesia Pre-Procedure Evaluation    Patient: Joseph R Barthel   MRN: [id number] : 1985        Procedure : Procedure(s):  Esophagoscopy, gastroscopy, duodenoscopy (EGD), combined  Pouchoscopy          No past medical history on file.   No past surgical history on file.   Allergies   Allergen Reactions    Ferumoxytol Shortness Of Breath and Other (See Comments)     Loss of consciousness     Morphine Shortness Of Breath     Itching     Morpholine Salicylate Hives      Social History     Tobacco Use    Smoking status: Some Days     Types: Cigarettes    Smokeless tobacco: Never   Substance Use Topics    Alcohol use: Not on file      Wt Readings from Last 1 Encounters:   25 52.2 kg (115 lb)           Physical Exam    Airway        Mallampati: I   TM distance: > 3 FB   Neck ROM: full   Mouth opening: > 3 cm    Respiratory Devices and Support         Dental       (+) Completely normal teeth      Cardiovascular   cardiovascular exam normal          Pulmonary   pulmonary exam normal                OUTSIDE LABS:  CBC:   Lab Results   Component Value Date    WBC 12.8 (H) 11/10/2024    WBC 10.6 2024    HGB 10.7 (L) 11/10/2024    HGB 10.7 (L) 2024    HCT 35.4 (L) 11/10/2024    HCT 36.3 (L) 2024     (H) 11/10/2024     2024     BMP:   Lab Results   Component Value Date     11/10/2024     10/30/2024    POTASSIUM 4.2 11/10/2024    POTASSIUM 4.0 10/30/2024    CHLORIDE 103 11/10/2024    CHLORIDE 100 10/30/2024    CO2 20 (L) 11/10/2024    CO2 25 10/30/2024    BUN 13.8 11/10/2024    BUN 10.6 10/30/2024    CR 0.98 11/10/2024    CR 0.98 10/30/2024     (H) 11/10/2024    GLC 90 2024     COAGS: No results found for: \"PTT\", \"INR\", \"FIBR\"  POC: No results found for: \"BGM\", \"HCG\", \"HCGS\"  HEPATIC:   Lab Results   Component Value Date    ALBUMIN 4.1 11/10/2024    PROTTOTAL 7.2 11/10/2024    ALT 11 11/10/2024    AST 18 11/10/2024    ALKPHOS 80 11/10/2024    BILITOTAL " 0.8 11/10/2024     OTHER:   Lab Results   Component Value Date    LACT 1.3 09/23/2024    JEOVANY 9.1 11/10/2024    MAG 2.0 11/10/2024    LIPASE 21 11/10/2024    TSH 1.09 11/08/2024    SED 16 (H) 11/10/2024       Anesthesia Plan    ASA Status:  2    NPO Status:  NPO Appropriate    Anesthesia Type: MAC.     - Reason for MAC: straight local not clinically adequate   Induction: Intravenous, Propofol.   Maintenance: TIVA.        Consents    Anesthesia Plan(s) and associated risks, benefits, and realistic alternatives discussed. Questions answered and patient/representative(s) expressed understanding.     - Discussed: Risks, Benefits and Alternatives for BOTH SEDATION and the PROCEDURE were discussed     - Discussed with:  Patient, Parent (Mother and/or Father)      - Extended Intubation/Ventilatory Support Discussed: No.      - Patient is DNR/DNI Status: No     Use of blood products discussed: No .     Postoperative Care       PONV prophylaxis: Ondansetron (or other 5HT-3), Background Propofol Infusion     Comments:               Wilfrido Lilly MD    I have reviewed the pertinent notes and labs in the chart from the past 30 days and (re)examined the patient.  Any updates or changes from those notes are reflected in this note.    Clinically Significant Risk Factors Present on Admission

## 2025-02-13 NOTE — ANESTHESIA POSTPROCEDURE EVALUATION
Patient: Joseph R Barthel    Procedure: Procedure(s):  Esophagoscopy, gastroscopy, duodenoscopy (EGD), biopsy  Pouchoscopy with biopsy       Anesthesia Type:  MAC    Note:  Disposition: Outpatient   Postop Pain Control: Uneventful            Sign Out: Well controlled pain   PONV: No   Neuro/Psych: Uneventful            Sign Out: Acceptable/Baseline neuro status   Airway/Respiratory: Uneventful            Sign Out: Acceptable/Baseline resp. status   CV/Hemodynamics: Uneventful            Sign Out: Acceptable CV status; No obvious hypovolemia; No obvious fluid overload   Other NRE: NONE   DID A NON-ROUTINE EVENT OCCUR?            Last vitals:  Vitals Value Taken Time   BP 95/64 02/13/25 1515   Temp 36.6  C (97.8  F) 02/13/25 1515   Pulse 53 02/13/25 1515   Resp 14 02/13/25 1515   SpO2 100 % 02/13/25 1517   Vitals shown include unfiled device data.    Electronically Signed By: Christopher Walden MD  February 13, 2025  3:36 PM

## 2025-02-21 ENCOUNTER — TELEPHONE (OUTPATIENT)
Dept: GASTROENTEROLOGY | Facility: CLINIC | Age: 40
End: 2025-02-21

## 2025-02-21 NOTE — TELEPHONE ENCOUNTER
Per Dr. David, patient to be overbooked into clinic within the next month to discuss results of recent pouchoscopy. Called patient to offer appointment on 2/26/25; no answer. Left voicemail with callback number.

## 2025-02-21 NOTE — TELEPHONE ENCOUNTER
Patient returned call. Confirmed appointment with Dr. David on Wednesday, February 26th @ 0800 for a virtual visit.

## 2025-02-26 ENCOUNTER — MYC MEDICAL ADVICE (OUTPATIENT)
Dept: FAMILY MEDICINE | Facility: CLINIC | Age: 40
End: 2025-02-26

## 2025-02-26 ENCOUNTER — VIRTUAL VISIT (OUTPATIENT)
Dept: GASTROENTEROLOGY | Facility: CLINIC | Age: 40
End: 2025-02-26
Payer: COMMERCIAL

## 2025-02-26 VITALS — WEIGHT: 115 LBS | HEIGHT: 67 IN | BODY MASS INDEX: 18.05 KG/M2

## 2025-02-26 DIAGNOSIS — K50.119 CROHN'S DISEASE OF LARGE INTESTINE WITH COMPLICATION (H): Primary | ICD-10-CM

## 2025-02-26 DIAGNOSIS — R10.84 GENERALIZED ABDOMINAL PAIN: ICD-10-CM

## 2025-02-26 RX ORDER — DICYCLOMINE HYDROCHLORIDE 10 MG/1
10 CAPSULE ORAL 4 TIMES DAILY PRN
Qty: 240 CAPSULE | Refills: 0 | Status: SHIPPED | OUTPATIENT
Start: 2025-02-26 | End: 2025-04-27

## 2025-02-26 ASSESSMENT — PAIN SCALES - GENERAL: PAINLEVEL_OUTOF10: MILD PAIN (3)

## 2025-02-26 NOTE — PROGRESS NOTES
Virtual Visit Details    Type of service:  Video Visit     Originating Location (pt. Location): Home    Distant Location (provider location):  On-site  Platform used for Video Visit: [employer]    Video start: 8:02 AM  Video end: 8: 40 AM

## 2025-02-26 NOTE — TELEPHONE ENCOUNTER
Patient seen once only for routine, requesting medication likely for sleep, patient informed to schedule appointment. Tammi Carrillo R.N.

## 2025-02-26 NOTE — PROGRESS NOTES
Medication Therapy Management (MTM) Encounter    ASSESSMENT:                            Medication Adherence/Access: No issues identified.    Crohn's Disease:  Provided education on Skyrizi today including dosing, general administration, side effects (both common/serious), precautions, monitoring and time to efficacy. Discussed data on malignancy and risk of serious infection in depth. Encouraged indicated non-live vaccines and avoidance of live vaccines. Discussed potential need to hold therapy in the setting of signs/symptoms of active infection. Encouraged him to contact the gastroenterology clinic in the event he has questions on this.     Truman would benefit from starting treatment with Skyrizi 600 mg IV at weeks 0, 4, and 8, followed by 360 mg subcutaneous at week 12 and every 8 weeks thereafter. He is up to date on routine maintenance labs. He is up to date on annual tuberculosis screening. He is indicated for a few vaccinations which were recommended to them. He is not getting adequate calcium in his diet and supplementation was recommended.     Iron deficiency anemia:     Truman would benefit IV iron replacement. Given his previous reaction was to Feraheme, I would recommend Venofer 200 mg IV for 5 doses. Truman would benefit from premedication with methylprednisolone and famotidine prior to each Venofer dose.     PLAN:                            Recommend starting Skyrizi 600 mg IV at weeks 0, 4, and 8, followed by 360 mg subcutaneous at week 12 and every 8 weeks thereafter once approved by insurance  To start the infusion approval process you will need to call to scheduled your infusion appointments. [facility] center: [phone]   You can sign up for Skyrizi complete to enroll in their copay assistance card. [email or web address]  Truman will consider the following vaccines:  Hepatitis B vaccine (3-dose series)  Pneumococcal pneumonia (Prevnar-20)  Shingles   I recommend  starting IV replacement with iron sucrose 200 mg IV for 5 doses. Given your history of reaction to iron infusions, I recommend we pre-medicate you with Methylprednisone and famotidine prior to each infusion. I will review this plan with Dr. David and Dr. Becerra.  Consider starting a calcium supplement. I recommend calcium 600 mg/vitamin D 400 units 1 tablet by mouth once daily with food. If this is tolerated after 1-2 weeks you can increase to 1 tablet by mouth twice daily with food.     Follow-up: 5/2/2025 10:00 AM phone visit    SUBJECTIVE/OBJECTIVE:                          Joseph Barthel is a 39 year old male seen for a follow-up visit.       Reason for visit: Skyrizi start, hx of iron infusion reactions.    Allergies/ADRs: Reviewed in chart  Past Medical History: Reviewed in chart  Tobacco: He reports that he has been smoking cigarettes. He has never used smokeless tobacco.Nicotine/Tobacco Cessation Plan  Information offered: Truman reports he only smokes in social situations and denies need for quit support at this time    Alcohol: occasional  THC/CBD: yes    Medication Adherence/Access: no issues reported.    Crohn's Disease:   Skyrizi 600 mg IV at weeks 0, 4, and 8, followed by 360 mg subcutaneous at week 12 and every 8 weeks thereafter (not started)  Dicyclomine 10 mg four times a day as needed    Truman has history of ulcerative colitis that was refractory to medication therapy. He had colectomy with subsequent IPAA formation. He was then found to have Crohn's disease of his pouch. He was previously treated for this was adalimumab with no improvement in symptoms. He is not currently on treatment for his Crohn's disease.     Still experiecing abdominal pain after meals - usually vomits after he eats   Has not started dicyclomine    Last provider visit: 2/26/2025 - Jam David MD  Next provider visit: 4/15/2025 - Andrey Weinstein PA-C  Last Quantiferon: 12/19/2024    Last pouchoscopy  2/13/2025:    Impression:    - Perianal skin tags found on perianal exam.                          - Inflammation was found in the distal pre-pouch ileum                          and pouch inlet secondary to Crohn's disease with                          ileitis. Biopsied.                          - Stricture with ulceration at the pouch inlet.                          Biopsied.                          - The ileoanal pouch is otherwise normal. Biopsied.                          - Rectal cuff with healthy appearing mucosa seen.                          Biopsied.                          There is inflammation in the distal ileum and pouch                          inlet conistent with Crohn's Disease. There is severe                          stricturing at the inlet that was dilated to 10 mm                          today. Will discuss starting biologic therapy and                          repeat pouchoscopy for further dilation.     IBD Health Maintenance    Vaccinations:  All patients on immunosuppression should avoid live vaccines unless specifically indicated.    -- Influenza (last dose) 10/2/2024  -- Tetanus booster (last dose) 9/7/2023  -- Pneumococcal Pneumonia               PCV-13: not on file              PPSV-23: not on file              PCV-20/PCV-21: not on file  -- COVID-19 (last dose): 10/11/2024    One time confirmation of immunity or serologies:  -- Hepatitis A (serologies or immunizations) serologies indicate immunity (9/7/2023)  -- Hepatitis B (serologies or immunizations) serologies do not indicate immunity  -- Varicella/Zoster               Varicella: presumed exposure based on age              Zoster: not on file  -- MMR 3/16/1987  -- Meningococcal meningitis (all patients at risk for meningitis)-- not on file    Due to the immunosuppression in this patient, I would not advise administration of live vaccines such as varicella/VZV, intranasal influenza, MMR, or yellow fever vaccine (if traveling).       Immunosuppressive Screening:    Lab Results   Component Value Date    AUSAB <3.50 12/19/2024    HEPBANG Nonreactive 12/19/2024    HBCAB Nonreactive 12/19/2024    HCVAB Nonreactive 12/19/2024    TBRES Negative 12/19/2024       Bone mineral density screening   -- Recommend all patients supplement with calcium and vitamin D  -- No DEXA on file    Cancer Screening:    Skin cancer screening: Recommended periodically due to patient's immunosuppression and diagnosis of IBD.    Depression Screening:    PHQ-2 Score:         2/26/2025     7:53 AM 11/19/2024    11:12 AM   PHQ-2 ( 1999 [employer])   Q1: Little interest or pleasure in doing things 0 0   Q2: Feeling down, depressed or hopeless 0 0   PHQ-2 Score 0 0       Research:  Are you interested in being contacted about enrollment in clinical research studies? Yes    Would you like to receive a quarterly newsletter on research via email. YES [email or web address]       Misc:  -- Avoid tobacco use  -- Avoid NSAIDs as there is potentially a 25% chance of causing an IBD flare    Iron deficiency anemia:  Flinstones multivitamin w/ iron 1 tablet daily    Truamn has been on a Flinstone vitamin with iron since November. Recent iron studies still indicated iron deficiency anemia. Truman does have history of serious infusion reaction to Feraheme. He notes that his symptoms happened quickly after the infusion started and he reports tunnel vision followed by loss of consciousness.     Given recent iron studies, we discussed IV iron with an alternative iron product and premedication. Truman is in agreement to IV iron replacement at this time.     ----------------    I spent 55 minutes with this patient today. All changes were made via collaborative practice agreement with Jam David.     A summary of these recommendations was sent via [employer].    Elysia Hannah PharmD, BCPS  MTM Pharmacist   [facility]   Phone: [phone]    Telemedicine Visit  Details  The patient's medications can be safely assessed via a telemedicine encounter.  Type of service:  Telephone visit  Originating Location (pt. Location): Home    Distant Location (provider location):  Off-site  Start Time:  10:01 AM  End Time: 10:56 AM     Medication Therapy Recommendations  Crohn's disease of small intestine (H)   1 Rationale: Preventive therapy - Needs additional medication therapy - Indication   Recommendation: Order Vaccine - Prevnar 20 0.5 ML Kait   Status: Accepted per CPA   Identified Date: 2/27/2025         2 Rationale: Preventive therapy - Needs additional medication therapy - Indication   Recommendation: Start Medication - Calcium 600+D High Potency 600-10 MG-MCG Tabs   Status: Patient Agreed - Adherence/Education   Identified Date: 2/27/2025

## 2025-02-26 NOTE — PROGRESS NOTES
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 Not on Crohn's therapy currently but he has been on TNF inhibitors in the past. Today we discussed starting biologic therapy and decided on Skyrizi. We will given Skyrizi 6 months and then plan to repeat colonoscopy to assess disease response and also assess inlet pouch stricture. While there is likely some inflammation contributing to stricture, I suspect this is mostly scar tissue. We will ask our advanced endoscopy team about placing stent for stricture management.     - Plan to start Skyrizi  - Meet with [facility] pharmacy to discuss medication more.   - Colonoscopy in 6 months to assess disease response and likely stent placement for stricture management (with advanced endoscopy).     2. Iron deficiency anemia   Likely due to ongoing inflammation from Crohn's disease. Minimal improvement of iron studies on oral iron. He has had infusion reaction with previous IV iron (passed out at onset of infusion, no angioedema or respiratory symptoms). Would like to explore other IV iron formulations with pharmacy as I don't think oral supplementation is adequate and may be causing some GI upset.   - Discuss alternative IV iron formulations and pretreatment meds with [facility] pharmacy    3. Vomiting and abdominal pain  Vomiting 3-4 times per week which has been chronic issue for patient and he feels is driven by anxiety. No upper GI findings on EGD or MRE to explain symptoms. Abdominal discomfort may be drive by Crohn's activity but  less likely that vomiting is. Antiemetics have not been helpful in the past.   - Dicyclomine for cramping abdominal pain   - Treat active Crohn's disease as above  - If ongoing symptoms in future may consider gastric emptying study to evaluate for gastroparesis and/or GI health psychologist given anxiety triggers    IBD Health Care Maintenance:  - Following with MTM      Misc:  -- Avoid tobacco use  -- Avoid NSAIDs as there is potentially a 25% chance of causing an IBD flare    Return to clinic in 6 months     Patient seen with Dr. David.     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  Current CD medications: none  Prior IBD surgeries:   -total colectomy 2001/2002  -IPAA formation  -multiple abdominal surgeries due to small bowel obstruction - s/p partial small bowel removal  Prior IBD Medications:  -prednisone - prior to colectomy  -mesalamine products - did not respond - prior to colectomy  -?azathiprine prior to colectomy - he is not sure - if he tried it, it did not control his UC  -infliximab (Remicade) infusions - did not respond (prior to colectomy)  -adalimumab (Humira) [id number] - treated for 1 year after pouchoscopy in 2017 that showed concern for Crohn's of the pouch and pre-pouch ileum. He reports this did not change his chronic vomiting    DRUG MONITORING  TPMT enzyme activity: pending    6-TGN/6-MMPN levels: NA    Biologic concentration: NA    DISEASE ASSESSMENT  Labs  Recent Labs   Lab Test 02/13/25  1137 11/10/24  0453   SED  --  16*   HGB 10.9* 10.7*     Fecal calprotectin: none  Endoscopy:   - Pouchoscopy 2/2025 - inflammation in distal pre-pouch ileum and pouch inlet. Stricture at pouch inlet which was dilated to 10mm  - EGD 2/2025 - erythematous mucosa in stomach. Biopsies with chronic  inflammation   -pouchoscopy 4/2017 - ileal pouch with a few small aphthous ulcers. Ileorectal anastomosis narrowed, ulcerated with granulation tissue. Gastroscope could not be passed and could only be passed with pediatric gastroscope. Elías-TI (pre-pouch ileum?) evaluated 40 cm with several aphthous ulcer and edema. Findings suspicious for Crohn's Disease. Pathology: elías-TI focal acute ileitis. Rectal pouch - chronic active pouchitis. Ileo-rectal anastomosis. Granulation tissue with chronic active inflammation. Pouch biopsies with ileal mucosa and no inflammation  -EGD 4/20017 - normal esophagus, normal stomach and normal duodenum. Duodenum biopsies normal. Stomach biopsies unremarkable with no h pylori  Enterography:   - MRE 1/2025 - short segment mild active Crohn's disease at small bowel anastomosis and proximal to ileal J pouch.   -CTE at outside hospital 2017 - dilated rectum, folds prominent in proximal small bowel (nonspecific). No inflammatory changes  C diff: none    sIBDQ:   IBDQ Score Date IBDQ - Total Score  (Higher score better)   12/14/2024  10:27 AM 43           HPI:   Truman is here for follow up today.     He last saw Dr. David in December for initial consultation. Briefly, he was diagnosed with UC in 2000 and underwent colectomy with subsequent IPAA formation due to medically refractory disease in 2001/2002. Post-op course was complicated some small bowel obstructions in the years after his surgery and he did have at least one ex lap with small bowel resection (we do not have these records). He had pouchoscopy in 2017 which first questioned Crohn's disease of pouch and was treated with 1 year of adalimumab but stopped med and was lost to follow up after he moved.     His recent pouchoscopy showed active inflammation in pouch and pre pouch ileum consistent with Crohn's disease and he had severe stenosis at pouch inlet which was dilated to 10mm.    Over the last 1-2 months he has noticed increase in  gas and bloating. Accompanied by cramping abdominal pain. Having 8 bowel movement per day (previously having 4). 3 bowel movement overnight.   No fever or chills.   No urgency.    Vomiting symptoms are the same, vomiting a few times per week. 3-4 after hours after eating, chunks of food he ate.     Extra intestinal manifestations of IBD:  No uveitis/episcleritis  No aphthous ulcers   No arthritis   No erythema nodosum/pyoderma gangrenosum.     PERTINENT PAST MEDICAL HISTORY:  No past medical history on file.    PREVIOUS SURGERIES:  Past Surgical History:   Procedure Laterality Date    ESOPHAGOSCOPY, GASTROSCOPY, DUODENOSCOPY (EGD), COMBINED N/A 2/13/2025    Procedure: Esophagoscopy, gastroscopy, duodenoscopy (EGD), biopsy;  Surgeon: Jam David MD;  Location: [facility] OR    POUCHOSCOPY N/A 2/13/2025    Procedure: Pouchoscopy with biopsy;  Surgeon: Jam David MD;  Location: [facility] OR       PREVIOUS ENDOSCOPY:  No results found for this or any previous visit.]    ALLERGIES:     Allergies   Allergen Reactions    Ferumoxytol Shortness Of Breath and Other (See Comments)     Loss of consciousness     Morphine Shortness Of Breath     Itching     Morpholine Salicylate Hives       PERTINENT MEDICATIONS:    Current Outpatient Medications:     emtricitabine-tenofovir (TRUVADA) 200-300 MG per tablet, Take 1 tablet by mouth daily., Disp: 90 tablet, Rfl: 2    minoxidil (LONITEN) 2.5 MG tablet, Take 1 tablet (2.5 mg) by mouth daily., Disp: 30 tablet, Rfl: 11    Nutritional Supplements (BOOST PLUS), Take 1 Bottle by mouth daily., Disp: 7110 mL, Rfl: 1    Pediatric Multiple Vitamins (FLINTSTONES MULTIVITAMIN) CHEW, Take 1 chew tab by mouth daily., Disp: , Rfl:     sertraline (ZOLOFT) 100 MG tablet, Take 100 mg by mouth daily., Disp: , Rfl:     traZODone (DESYREL) 100 MG tablet, Take 100 mg by mouth at bedtime., Disp: , Rfl:     SOCIAL HISTORY:  Social History     Socioeconomic History    Marital status: Single      Spouse name: Not on file    Number of children: Not on file    Years of education: Not on file    Highest education level: Not on file   Occupational History    Not on file   Tobacco Use    Smoking status: Some Days     Types: Cigarettes    Smokeless tobacco: Never   Vaping Use    Vaping status: Never Used   Substance and Sexual Activity    Alcohol use: Not on file    Drug use: Not on file    Sexual activity: Not on file   Other Topics Concern    Not on file   Social History Narrative    Not on file     Social Drivers of Health     Financial Resource Strain: Low Risk  (11/8/2024)    Financial Resource Strain     Within the past 12 months, have you or your family members you live with been unable to get utilities (heat, electricity) when it was really needed?: No   Food Insecurity: Low Risk  (11/8/2024)    Food Insecurity     Within the past 12 months, did you worry that your food would run out before you got money to buy more?: No     Within the past 12 months, did the food you bought just not last and you didn t have money to get more?: No   Transportation Needs: Low Risk  (11/8/2024)    Transportation Needs     Within the past 12 months, has lack of transportation kept you from medical appointments, getting your medicines, non-medical meetings or appointments, work, or from getting things that you need?: No   Physical Activity: Unknown (11/8/2024)    Exercise Vital Sign     Days of Exercise per Week: 3 days     Minutes of Exercise per Session: Not on file   Stress: Stress Concern Present (11/8/2024)    [employer] [employer] - Occupational Stress Questionnaire     Feeling of Stress : Very much   Social Connections: Unknown (11/8/2024)    Social Connection and Isolation Panel [NHANES]     Frequency of Communication with Friends and Family: Not on file     Frequency of Social Gatherings with Friends and Family: Three times a week     Attends Yazidi Services: Not on file     Active Member of  Clubs or Organizations: Not on file     Attends Club or Organization Meetings: Not on file     Marital Status: Not on file   Interpersonal Safety: Low Risk  (11/8/2024)    Interpersonal Safety     Do you feel physically and emotionally safe where you currently live?: Yes     Within the past 12 months, have you been hit, slapped, kicked or otherwise physically hurt by someone?: No     Within the past 12 months, have you been humiliated or emotionally abused in other ways by your partner or ex-partner?: No   Housing Stability: High Risk (11/8/2024)    Housing Stability     Do you have housing? : Yes     Are you worried about losing your housing?: Yes       FAMILY HISTORY:  No family history on file.    Past/family/social history reviewed and no changes    PHYSICAL EXAMINATION:  GENERAL: alert and no distress  EYES: Eyes grossly normal to inspection.  No discharge or erythema, or obvious scleral/conjunctival abnormalities.  RESP: No audible wheeze, cough, or visible cyanosis.    SKIN: Visible skin clear. No significant rash, abnormal pigmentation or lesions.  NEURO: Cranial nerves grossly intact.  Mentation and speech appropriate for age.  PSYCH: Appropriate affect, tone, and pace of words        PERTINENT STUDIES:  Most recent CBC:  Recent Labs   Lab Test 02/13/25  1137 11/10/24  0453   WBC 8.9 12.8*   HGB 10.9* 10.7*   HCT 34.9* 35.4*    514*     Most recent hepatic panel:  Recent Labs   Lab Test 11/10/24  0453 10/30/24  1832   ALT 11 13   AST 18 18     Most recent creatinine:  Recent Labs   Lab Test 11/10/24  0453 10/30/24  1832   CR 0.98 0.98

## 2025-02-26 NOTE — PATIENT INSTRUCTIONS
It was a pleasure meeting with you today and discussing your healthcare plan. Below is a summary of what we covered:    -- Plan to start Skyrizi medication   -- Meet with [facility] pharmacy to talk about Skyrizi medication and a different IV iron infusion to try.     - Try dicyclomine for abdominal cramping   - Plan for colonoscopy 6 months after starting Skyrizi. This procedure will be with a advanced GI doctor who could place stent across the strictured area.       Please see below for any additional questions and scheduling guidelines.    Sign up for [facility]age: [facility]age patient portal serves as a secure platform for accessing your medical records from the [facility]. Additionally, [employer] facilitates easy, timely, and secure messaging with your care team. If you have not signed up, you may do so by using the provided code or calling [phone].    Coordinating your care after your visit:  There are multiple options for scheduling your follow-up care based on your provider's recommendation.    How do I schedule a follow-up clinic appointment:   After your appointment, you may receive scheduling assistance with the Clinic Coordinators by having a seat in the waiting room and a Clinic Coordinator will call you up to schedule.  Virtual visits or after you leave the clinic:  Your provider has placed a follow-up order in the [facility] portal for scheduling your return appointment. A member of the scheduling team will contact you to schedule.  [facility]age Scheduling: Timely scheduling through i[facility]age is advised to ensure appointment availability.   Call to schedule: You may schedule your follow-up appointment(s) by calling [phone], option 1.    How do I schedule my endoscopy or colonoscopy procedure:  If a procedure, such as a colonoscopy or upper endoscopy was ordered by your provider, the scheduling team will contact you to schedule this procedure. Or you may choose to call to schedule at   [phone], option  2.  Please allow 20-30 minutes when scheduling a procedure.    How do I get my blood work done? To get your blood work done, you need to schedule a lab appointment at an [facility]. There are multiple ways to schedule:   At the clinic: The Clinic Coordinator you meet after your visit can help you schedule a lab appointment.   Showpad scheduling: Showpad offers online lab scheduling at all [facility] laboratory locations.   Call to schedule: You can call [phone] to schedule your lab appointment.    How do I schedule my imaging study: To schedule imaging studies, such as CT scans, ultrasounds, MRIs, or X-rays, contact Imaging Services at [phone].    How do I schedule a referral to another doctor: If your provider recommended a referral to another specialist(s), the referral order was placed by your provider. You will receive a phone call to schedule this referral, or you may choose to call the number attached to the referral to self-schedule.    For Post-Visit Question(s):  For any inquiries following today's visit:  Please utilize Showpad messaging and allow 48 hours for reply or contact the Call Center during normal business hours at [phone], option 3.  For Emergent After-hours questions, contact the On-Call GI Fellow through the [facility]  at [phone].  In addition, you may contact your Nurse directly using the provided contact information.    Test Results:  Test results will be accessible via Showpad in compliance with the 21st Century Cures Act. This means that your results will be available to you at the same time as your provider. Often you may see your results before your provider does. Results are reviewed by staff within two weeks with communication follow-up. Results may be released in the patient portal prior to your care team review.    Prescription Refill(s):  Medication prescribed by your provider will be addressed during your visit. For  future refills, please coordinate with your pharmacy. If you have not had a recent clinic visit or routine labs, for your safety, your provider may not be able to refill your prescription.

## 2025-02-26 NOTE — LETTER
2/26/2025      Joseph R Barthel  [address]  [facility] [address]      Dear Colleague,    Thank you for referring your patient, Joseph R Barthel, to the [facility] [place]. Please see a copy of my visit note below.    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 Not on Crohn's therapy currently but he has been on TNF inhibitors in the past. Today we discussed starting biologic therapy and decided on Skyrizi. We will given Skyrizi 6 months and then plan to repeat colonoscopy to assess disease response and also assess inlet pouch stricture. While there is likely some inflammation contributing to stricture, I suspect this is mostly scar tissue. We will ask our advanced endoscopy team about placing stent for stricture management.     - Plan to start Skyrizi  - Meet with [facility] pharmacy to discuss medication more.   - Colonoscopy in 6 months to assess disease response and likely stent placement for stricture management (with advanced endoscopy).     2. Iron deficiency anemia   Likely due to ongoing inflammation from Crohn's disease. Minimal improvement of iron studies on oral iron. He has had infusion reaction with previous IV iron (passed out at onset of infusion, no angioedema or respiratory symptoms). Would like to explore other IV iron formulations with pharmacy as I don't think oral supplementation is adequate and may be causing some GI upset.   - Discuss alternative IV iron formulations and pretreatment meds with  [facility] pharmacy    3. Vomiting and abdominal pain  Vomiting 3-4 times per week which has been chronic issue for patient and he feels is driven by anxiety. No upper GI findings on EGD or MRE to explain symptoms. Abdominal discomfort may be drive by Crohn's activity but less likely that vomiting is. Antiemetics have not been helpful in the past.   - Dicyclomine for cramping abdominal pain   - Treat active Crohn's disease as above  - If ongoing symptoms in future may consider gastric emptying study to evaluate for gastroparesis and/or GI health psychologist given anxiety triggers    IBD Health Care Maintenance:  - Following with MTM      Misc:  -- Avoid tobacco use  -- Avoid NSAIDs as there is potentially a 25% chance of causing an IBD flare    Return to clinic in 6 months     Patient seen with Dr. David.     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  Current CD medications: none  Prior IBD surgeries:   -total colectomy 2001/2002  -IPAA formation  -multiple abdominal surgeries due to small bowel obstruction - s/p partial small bowel removal  Prior IBD Medications:  -prednisone - prior to colectomy  -mesalamine products - did not respond - prior to colectomy  -?azathiprine prior to colectomy - he is not sure - if he tried it, it did not control his UC  -infliximab (Remicade) infusions - did not respond (prior to colectomy)  -adalimumab (Humira) [id number] - treated for 1 year after pouchoscopy in 2017 that showed concern for Crohn's of the pouch and pre-pouch ileum. He reports this did not change his chronic vomiting    DRUG MONITORING  TPMT enzyme activity: pending    6-TGN/6-MMPN levels: NA    Biologic concentration: NA    DISEASE ASSESSMENT  Labs  Recent Labs   Lab Test 02/13/25  1137 11/10/24  0453   SED  --  16*   HGB  10.9* 10.7*     Fecal calprotectin: none  Endoscopy:   - Pouchoscopy 2/2025 - inflammation in distal pre-pouch ileum and pouch inlet. Stricture at pouch inlet which was dilated to 10mm  - EGD 2/2025 - erythematous mucosa in stomach. Biopsies with chronic inflammation   -pouchoscopy 4/2017 - ileal pouch with a few small aphthous ulcers. Ileorectal anastomosis narrowed, ulcerated with granulation tissue. Gastroscope could not be passed and could only be passed with pediatric gastroscope. Elías-TI (pre-pouch ileum?) evaluated 40 cm with several aphthous ulcer and edema. Findings suspicious for Crohn's Disease. Pathology: elías-TI focal acute ileitis. Rectal pouch - chronic active pouchitis. Ileo-rectal anastomosis. Granulation tissue with chronic active inflammation. Pouch biopsies with ileal mucosa and no inflammation  -EGD 4/20017 - normal esophagus, normal stomach and normal duodenum. Duodenum biopsies normal. Stomach biopsies unremarkable with no h pylori  Enterography:   - MRE 1/2025 - short segment mild active Crohn's disease at small bowel anastomosis and proximal to ileal J pouch.   -CTE at outside hospital 2017 - dilated rectum, folds prominent in proximal small bowel (nonspecific). No inflammatory changes  C diff: none    sIBDQ:   IBDQ Score Date IBDQ - Total Score  (Higher score better)   12/14/2024  10:27 AM 43           HPI:   Truman is here for follow up today.     He last saw Dr. David in December for initial consultation. Briefly, he was diagnosed with UC in 2000 and underwent colectomy with subsequent IPAA formation due to medically refractory disease in 2001/2002. Post-op course was complicated some small bowel obstructions in the years after his surgery and he did have at least one ex lap with small bowel resection (we do not have these records). He had pouchoscopy in 2017 which first questioned Crohn's disease of pouch and was treated with 1 year of adalimumab but stopped med and was lost to follow up  after he moved.     His recent pouchoscopy showed active inflammation in pouch and pre pouch ileum consistent with Crohn's disease and he had severe stenosis at pouch inlet which was dilated to 10mm.    Over the last 1-2 months he has noticed increase in gas and bloating. Accompanied by cramping abdominal pain. Having 8 bowel movement per day (previously having 4). 3 bowel movement overnight.   No fever or chills.   No urgency.    Vomiting symptoms are the same, vomiting a few times per week. 3-4 after hours after eating, chunks of food he ate.     Extra intestinal manifestations of IBD:  No uveitis/episcleritis  No aphthous ulcers   No arthritis   No erythema nodosum/pyoderma gangrenosum.     PERTINENT PAST MEDICAL HISTORY:  No past medical history on file.    PREVIOUS SURGERIES:  Past Surgical History:   Procedure Laterality Date     ESOPHAGOSCOPY, GASTROSCOPY, DUODENOSCOPY (EGD), COMBINED N/A 2/13/2025    Procedure: Esophagoscopy, gastroscopy, duodenoscopy (EGD), biopsy;  Surgeon: Jam David MD;  Location: [facility] OR     POUCHOSCOPY N/A 2/13/2025    Procedure: Pouchoscopy with biopsy;  Surgeon: Jam David MD;  [place] [facility]       PREVIOUS ENDOSCOPY:  No results found for this or any previous visit.]    ALLERGIES:     Allergies   Allergen Reactions     Ferumoxytol Shortness Of Breath and Other (See Comments)     Loss of consciousness      Morphine Shortness Of Breath     Itching      Morpholine Salicylate Hives       PERTINENT MEDICATIONS:    Current Outpatient Medications:      emtricitabine-tenofovir (TRUVADA) 200-300 MG per tablet, Take 1 tablet by mouth daily., Disp: 90 tablet, Rfl: 2     minoxidil (LONITEN) 2.5 MG tablet, Take 1 tablet (2.5 mg) by mouth daily., Disp: 30 tablet, Rfl: 11     Nutritional Supplements (BOOST PLUS), Take 1 Bottle by mouth daily., Disp: 7110 mL, Rfl: 1     Pediatric Multiple Vitamins (FLINTSTONES MULTIVITAMIN) CHEW, Take 1 chew tab by mouth daily.,  Disp: , Rfl:      sertraline (ZOLOFT) 100 MG tablet, Take 100 mg by mouth daily., Disp: , Rfl:      traZODone (DESYREL) 100 MG tablet, Take 100 mg by mouth at bedtime., Disp: , Rfl:     SOCIAL HISTORY:  Social History     Socioeconomic History     Marital status: Single     Spouse name: Not on file     Number of children: Not on file     Years of education: Not on file     Highest education level: Not on file   Occupational History     Not on file   Tobacco Use     Smoking status: Some Days     Types: Cigarettes     Smokeless tobacco: Never   Vaping Use     Vaping status: Never Used   Substance and Sexual Activity     Alcohol use: Not on file     Drug use: Not on file     Sexual activity: Not on file   Other Topics Concern     Not on file   Social History Narrative     Not on file     Social Drivers of Health     Financial Resource Strain: Low Risk  (11/8/2024)    Financial Resource Strain      Within the past 12 months, have you or your family members you live with been unable to get utilities (heat, electricity) when it was really needed?: No   Food Insecurity: Low Risk  (11/8/2024)    Food Insecurity      Within the past 12 months, did you worry that your food would run out before you got money to buy more?: No      Within the past 12 months, did the food you bought just not last and you didn t have money to get more?: No   Transportation Needs: Low Risk  (11/8/2024)    Transportation Needs      Within the past 12 months, has lack of transportation kept you from medical appointments, getting your medicines, non-medical meetings or appointments, work, or from getting things that you need?: No   Physical Activity: Unknown (11/8/2024)    Exercise Vital Sign      Days of Exercise per Week: 3 days      Minutes of Exercise per Session: Not on file   Stress: Stress Concern Present (11/8/2024)    [employer] - Occupational Stress Questionnaire      Feeling of Stress : Very much   Social  Connections: Unknown (11/8/2024)    Social Connection and Isolation Panel [NHANES]      Frequency of Communication with Friends and Family: Not on file      Frequency of Social Gatherings with Friends and Family: Three times a week      Attends Baptism Services: Not on file      Active Member of Clubs or Organizations: Not on file      Attends Club or Organization Meetings: Not on file      Marital Status: Not on file   Interpersonal Safety: Low Risk  (11/8/2024)    Interpersonal Safety      Do you feel physically and emotionally safe where you currently live?: Yes      Within the past 12 months, have you been hit, slapped, kicked or otherwise physically hurt by someone?: No      Within the past 12 months, have you been humiliated or emotionally abused in other ways by your partner or ex-partner?: No   Housing Stability: High Risk (11/8/2024)    Housing Stability      Do you have housing? : Yes      Are you worried about losing your housing?: Yes       FAMILY HISTORY:  No family history on file.    Past/family/social history reviewed and no changes    PHYSICAL EXAMINATION:  GENERAL: alert and no distress  EYES: Eyes grossly normal to inspection.  No discharge or erythema, or obvious scleral/conjunctival abnormalities.  RESP: No audible wheeze, cough, or visible cyanosis.    SKIN: Visible skin clear. No significant rash, abnormal pigmentation or lesions.  NEURO: Cranial nerves grossly intact.  Mentation and speech appropriate for age.  PSYCH: Appropriate affect, tone, and pace of words        PERTINENT STUDIES:  Most recent CBC:  Recent Labs   Lab Test 02/13/25  1137 11/10/24  0453   WBC 8.9 12.8*   HGB 10.9* 10.7*   HCT 34.9* 35.4*    514*     Most recent hepatic panel:  Recent Labs   Lab Test 11/10/24  0453 10/30/24  1832   ALT 11 13   AST 18 18     Most recent creatinine:  Recent Labs   Lab Test 11/10/24  0453 10/30/24  1832   CR 0.98 0.98             Attestation signed by Jam David MD at  2/26/2025  1:05 PM:  ATTENDING ATTESTATION:     DATE SEEN: 2/26/2025    Patient was discussed, seen, and examined by me, Jam David. The plan of care and pertinent data/imaging were reviewed with Dr. Becerra. I agree with the assessment and plan as delineated above with the following additions:     He has moderate to severe Crohn's Disease of the pouch inlet and pre-pouch ileum with at least a moderate stenosis at the pouch inlet. Dilated with the passage of the pediatric colonoscope. We discussed we will start risankizumab to help control active inflammation but he will need further dilation and possibly stenting to help open up the fibrostenotic portion of his stricture. He agrees with this plan.     We will start alternate IV iron with premeds (appreciate MTM input)    He has chronic nausea/vomiting. Some of this may be related to Crohn's Disease with stricturing, but the MRE did not show proximal dilation. Follow for symptom response to treatment. The patient has felt this is related to anxiety and this certainly may be a big factor as well.    Discussed meds and plan. All questions answered. He agrees to the plan.    Thank you for this consultation.  It was a pleasure to participate in the care of this patient; please contact us with any further questions.  I spent a total of 30 minutes during the day of encounter performed chart review, meeting with patient, patient counseling, care coordination, and documentation.      Please contact me with any further questions.    Jam David MD  [facility]  Division of Gastroenterology, Hepatology and Nutrition      Virtual Visit Details    Type of service:  Video Visit     Originating Location (pt. Location): Home    Distant Location (provider location):  On-site  Platform used for Video Visit: CAPPTURE    Video start: 8:02 AM  Video end: 8: 40 AM      Again, thank you for allowing me to participate in the care of your patient.         Sincerely,        Jam David MD    Electronically signed

## 2025-02-26 NOTE — NURSING NOTE
Current patient location:  Work    Is the patient currently in the state [facility]? YES    Visit mode: VIDEO    If the visit is dropped, the patient can be reconnected by:VIDEO VISIT: Text to cell phone:   Telephone Information:   Mobile [phone]       Will anyone else be joining the visit? NO  (If patient encounters technical issues they should call [phone])    Are changes needed to the allergy or medication list? No    Are refills needed on medications prescribed by this physician? NO    Rooming Documentation:  Questionnaire(s) completed    Reason for visit: ERNIE PICKARD

## 2025-02-27 ENCOUNTER — VIRTUAL VISIT (OUTPATIENT)
Dept: PHARMACY | Facility: CLINIC | Age: 40
End: 2025-02-27
Attending: INTERNAL MEDICINE
Payer: COMMERCIAL

## 2025-02-27 ENCOUNTER — MYC MEDICAL ADVICE (OUTPATIENT)
Dept: GASTROENTEROLOGY | Facility: CLINIC | Age: 40
End: 2025-02-27
Payer: COMMERCIAL

## 2025-02-27 ENCOUNTER — TELEPHONE (OUTPATIENT)
Dept: GASTROENTEROLOGY | Facility: CLINIC | Age: 40
End: 2025-02-27

## 2025-02-27 VITALS — HEIGHT: 67 IN | BODY MASS INDEX: 18.05 KG/M2 | WEIGHT: 115 LBS

## 2025-02-27 DIAGNOSIS — K50.00 CROHN'S DISEASE OF SMALL INTESTINE (H): Primary | ICD-10-CM

## 2025-02-27 DIAGNOSIS — D50.9 ANEMIA, IRON DEFICIENCY: Primary | ICD-10-CM

## 2025-02-27 DIAGNOSIS — D64.9 ANEMIA: ICD-10-CM

## 2025-02-27 DIAGNOSIS — K50.00 CROHN'S DISEASE OF SMALL INTESTINE (H): ICD-10-CM

## 2025-02-27 RX ORDER — ZOSTER VACCINE RECOMBINANT, ADJUVANTED 50 MCG/0.5
1 KIT INTRAMUSCULAR ONCE
Qty: 0.5 ML | Refills: 1 | Status: SHIPPED | OUTPATIENT
Start: 2025-02-27 | End: 2025-02-27

## 2025-02-27 RX ORDER — METHYLPREDNISOLONE SODIUM SUCCINATE 40 MG/ML
40 INJECTION INTRAMUSCULAR; INTRAVENOUS
Start: 2025-02-27

## 2025-02-27 RX ORDER — METHYLPREDNISOLONE SODIUM SUCCINATE 125 MG/2ML
125 INJECTION INTRAMUSCULAR; INTRAVENOUS ONCE
Start: 2025-02-27 | End: 2025-02-27

## 2025-02-27 RX ORDER — PNEUMOCOCCAL 20-VALENT CONJUGATE VACCINE 2.2; 2.2; 2.2; 2.2; 2.2; 2.2; 2.2; 2.2; 2.2; 2.2; 2.2; 2.2; 2.2; 2.2; 2.2; 2.2; 4.4; 2.2; 2.2; 2.2 UG/.5ML; UG/.5ML; UG/.5ML; UG/.5ML; UG/.5ML; UG/.5ML; UG/.5ML; UG/.5ML; UG/.5ML; UG/.5ML; UG/.5ML; UG/.5ML; UG/.5ML; UG/.5ML; UG/.5ML; UG/.5ML; UG/.5ML; UG/.5ML; UG/.5ML; UG/.5ML
0.5 INJECTION, SUSPENSION INTRAMUSCULAR ONCE
Qty: 0.5 ML | Refills: 0 | Status: SHIPPED | OUTPATIENT
Start: 2025-02-27 | End: 2025-02-27

## 2025-02-27 RX ORDER — HEPARIN SODIUM,PORCINE 10 UNIT/ML
5-20 VIAL (ML) INTRAVENOUS DAILY PRN
OUTPATIENT
Start: 2025-02-27

## 2025-02-27 RX ORDER — DIPHENHYDRAMINE HYDROCHLORIDE 50 MG/ML
25 INJECTION INTRAMUSCULAR; INTRAVENOUS
Start: 2025-02-27

## 2025-02-27 RX ORDER — MEPERIDINE HYDROCHLORIDE 25 MG/ML
25 INJECTION INTRAMUSCULAR; INTRAVENOUS; SUBCUTANEOUS
OUTPATIENT
Start: 2025-02-27

## 2025-02-27 RX ORDER — HEPARIN SODIUM (PORCINE) LOCK FLUSH IV SOLN 100 UNIT/ML 100 UNIT/ML
5 SOLUTION INTRAVENOUS
OUTPATIENT
Start: 2025-02-27

## 2025-02-27 RX ORDER — DIPHENHYDRAMINE HYDROCHLORIDE 50 MG/ML
50 INJECTION INTRAMUSCULAR; INTRAVENOUS
Start: 2025-02-27

## 2025-02-27 RX ORDER — EPINEPHRINE 1 MG/ML
0.3 INJECTION, SOLUTION, CONCENTRATE INTRAVENOUS EVERY 5 MIN PRN
OUTPATIENT
Start: 2025-02-27

## 2025-02-27 RX ORDER — ALBUTEROL SULFATE 90 UG/1
1-2 INHALANT RESPIRATORY (INHALATION)
Start: 2025-02-27

## 2025-02-27 RX ORDER — ALBUTEROL SULFATE 0.83 MG/ML
2.5 SOLUTION RESPIRATORY (INHALATION)
OUTPATIENT
Start: 2025-02-27

## 2025-02-27 ASSESSMENT — PAIN SCALES - GENERAL: PAINLEVEL_OUTOF10: NO PAIN (0)

## 2025-02-27 NOTE — TELEPHONE ENCOUNTER
Patient met with MTM today. Per Elysia Hannah [facility] and Dr. David, plan for Venofer 200mg x5 doses. Previous reaction to Feraheme. Plan to premedication with methylprednisolone 125 mg IV and famotidine 20 mg IV prior to each Venofer dose. Orders placed appropriately.

## 2025-02-27 NOTE — TELEPHONE ENCOUNTER
-- Therapy plan initiated for aJke (Crohn's)  -- Notified pharmacy liaison to initiate PA for Jake OBI  -- Pre-biologic labs completed 12/2024  -- MTM scheduled 2/27/25 w/ Elysia Hannah RPH  ________________________________________________________  Per clinic visit w/ Dr. David 2/26/25:

## 2025-02-27 NOTE — NURSING NOTE
Current patient location: Patient declined to provide     Is the patient currently in the state of [place]? YES    Visit mode: TELEPHONE    If the visit is dropped, the patient can be reconnected by:VIDEO VISIT: Send to e-mail at: [email or web address]    Will anyone else be joining the visit? NO  (If patient encounters technical issues they should call [phone])    Are changes needed to the allergy or medication list? Pt stated no changes to allergies and Pt stated no med changes    Are refills needed on medications prescribed by this physician? NO    Rooming Documentation:  Not applicable    Reason for visit: ERNIE PICKARD

## 2025-02-27 NOTE — TELEPHONE ENCOUNTER
PA Initiation    Medication: SKYRIZI 360 MG/2.4ML SC SOCT  Insurance Company: "[employer] Clinical Review - Phone [phone] Fax [phone]  Pharmacy Filling the Rx: [facility] - [place], [place] - [address]  Filling Pharmacy Phone:    Filling Pharmacy Fax:    Start Date: 2/27/2025     Unable to submit via CMM. Called insurance and they faxed over PA forms to be filled out and faxed back.  Faxed PA forms

## 2025-02-27 NOTE — PATIENT INSTRUCTIONS
"Recommendations from today's MTM visit:                                                      Recommend starting Skyrizi 600 mg IV at weeks 0, 4, and 8, followed by 360 mg subcutaneous at week 12 and every 8 weeks thereafter once approved by insurance  To start the infusion approval process you will need to call to scheduled your infusion appointments. [facility] center: [phone]   You can sign up for Skyrizi complete to enroll in their copay assistance card. [email or web address]  Truman will consider the following vaccines:  Hepatitis B vaccine (3-dose series)  Pneumococcal pneumonia (Prevnar-20)  Shingles   I recommend starting IV replacement with iron sucrose 200 mg IV for 5 doses. Given your history of reaction to iron infusions, I recommend we pre-medicate you with Methylprednisone and famotidine prior to each infusion. I will review this plan with Dr. David and Dr. Becerra.  Consider starting a calcium supplement. I recommend calcium 600 mg/vitamin D 400 units 1 tablet by mouth once daily with food. If this is tolerated after 1-2 weeks you can increase to 1 tablet by mouth twice daily with food.     Follow-up: 5/2/2025 10:00 AM phone visit    It was great speaking with you today.  I value your experience and would be very thankful for your time in providing feedback in our clinic survey. In the next few days, you may receive an email or text message from [employer] with a link to a survey related to your  clinical pharmacist.\"     To schedule another MTM appointment, please call the clinic directly or you may call the MTM scheduling line at [phone].    My Clinical Pharmacist's contact information:                                                      Please feel free to contact me with any questions or concerns you have.      Elysia Hannah PharmD, BCPS  MTM Pharmacist   [facility]   Phone: [phone]     "
PROVIDER:[TOKEN:[[id number]],FOLLOWUP:[Routine]]

## 2025-03-05 NOTE — TELEPHONE ENCOUNTER
I called [employer] for update on PA. I got transferred to 3 different reps until eventually I was told [employer] is not the processor for this member's plan.. I need to submit PA to [facility]. I was able to submit PA through [facility]. Unable to naomie is urgent.    [id number]

## 2025-03-06 ENCOUNTER — LAB (OUTPATIENT)
Dept: LAB | Facility: CLINIC | Age: 40
End: 2025-03-06
Payer: COMMERCIAL

## 2025-03-06 ENCOUNTER — VIRTUAL VISIT (OUTPATIENT)
Dept: FAMILY MEDICINE | Facility: CLINIC | Age: 40
End: 2025-03-06
Payer: COMMERCIAL

## 2025-03-06 DIAGNOSIS — Z72.51 HIGH RISK SEXUAL BEHAVIOR, UNSPECIFIED TYPE: ICD-10-CM

## 2025-03-06 DIAGNOSIS — D50.0 IRON DEFICIENCY ANEMIA DUE TO CHRONIC BLOOD LOSS: ICD-10-CM

## 2025-03-06 DIAGNOSIS — Z11.4 SCREENING FOR HIV (HUMAN IMMUNODEFICIENCY VIRUS): ICD-10-CM

## 2025-03-06 DIAGNOSIS — Z20.6 EXPOSURE TO HIV: ICD-10-CM

## 2025-03-06 DIAGNOSIS — K50.018 CROHN'S DISEASE OF SMALL INTESTINE WITH OTHER COMPLICATION (H): ICD-10-CM

## 2025-03-06 DIAGNOSIS — F51.01 PRIMARY INSOMNIA: Primary | ICD-10-CM

## 2025-03-06 DIAGNOSIS — F41.1 GENERALIZED ANXIETY DISORDER: ICD-10-CM

## 2025-03-06 PROCEDURE — 86780 TREPONEMA PALLIDUM: CPT

## 2025-03-06 PROCEDURE — 87389 HIV-1 AG W/HIV-1&-2 AB AG IA: CPT

## 2025-03-06 PROCEDURE — 87491 CHLMYD TRACH DNA AMP PROBE: CPT

## 2025-03-06 PROCEDURE — 86803 HEPATITIS C AB TEST: CPT

## 2025-03-06 PROCEDURE — 87591 N.GONORRHOEAE DNA AMP PROB: CPT

## 2025-03-06 PROCEDURE — 80053 COMPREHEN METABOLIC PANEL: CPT

## 2025-03-06 PROCEDURE — 36415 COLL VENOUS BLD VENIPUNCTURE: CPT

## 2025-03-06 RX ORDER — TRAZODONE HYDROCHLORIDE 100 MG/1
200 TABLET ORAL
Qty: 30 TABLET | Refills: 3 | Status: SHIPPED | OUTPATIENT
Start: 2025-03-06

## 2025-03-06 NOTE — PROGRESS NOTES
Truman is a 39 year old who is being evaluated via a billable video visit.    How would you like to obtain your AVS? LiveDatahart  If the video visit is dropped, the invitation should be resent by: Text to cell phone: [phone] [employer]Vault  Will anyone else be joining your video visit? No      Assessment & Plan     Primary insomnia  Stable chronic, likely attributable to multiple factors including abdominal pain and nausea, anxiety, JAMARI. Has been stable with self-limited use of trazodone. Sent RX today. Follow up annually for repeat RX.     Crohn's disease of small intestine with other complication (H  Iron deficiency anemia due to chronic blood loss)  Worsening. Follows GI. Stopped [place] vitamin, but has infusions scheduled.     High risk sexual behavior, unspecified type  Will have labs today. Needs annual follow up with labs q 3 months. Orders placed for these. He will check in via LiveDatahart at least every 3 months for symptoms and compliance with clinic visit annually.   - **HIV Antigen Antibody Combo FUTURE 2mo; Future  - Chlamydia trachomatis/Neisseria gonorrhoeae by PCR - Clinic Collect; Future  - Hepatitis C Screen Reflex to HCV RNA Quant and Genotype; Future  - **Basic metabolic panel FUTURE 2mo; Future    The longitudinal plan of care for the diagnosis(es)/condition(s) as documented were addressed during this visit. Due to the added complexity in care, I will continue to support Truman in the subsequent management and with ongoing continuity of care.              Subjective   Truman is a 39 year old, presenting for the following health issues:  Recheck Medication (Changing provider to prescribe Trazadone)        3/6/2025     3:06 PM   Additional Questions   Roomed by Fatemeh   Accompanied by self     History of Present Illness       Reason for visit:  Trazodone prescription    He eats 0-1 servings of fruits and vegetables daily.He consumes 0 sweetened beverage(s) daily.He exercises with enough effort to  increase his heart rate 9 or less minutes per day.  He exercises with enough effort to increase his heart rate 3 or less days per week.   He is taking medications regularly.            Using trazodone for sleep. Has stopped taking the rest of his psychiatric medications, and would like PCP to take over on this prescription. Uses as needed, so a couple times a week. Has good results without hung over feeling  MH: Feels it is tenuously stable, but has to stop therapy due to cost of GI workup and treatment. Was given information about free/sliding fee therapy from his therapist.   Crohn's is active, narrowing of pouch. Has planned iron infusion for anemia, which is being managed by GI.   PREP has been taking without missing doses. Is planning to get lab work completed today.    ROS: 10 point ROS neg other than the symptoms noted above in the HPI.        Objective           Vitals:  No vitals were obtained today due to virtual visit.    Physical Exam   GENERAL: alert and no distress  EYES: Eyes grossly normal to inspection.  No discharge or erythema, or obvious scleral/conjunctival abnormalities.  RESP: No audible wheeze, cough, or visible cyanosis.    SKIN: Visible skin clear. No significant rash, abnormal pigmentation or lesions.  NEURO: Cranial nerves grossly intact.  Mentation and speech appropriate for age.  PSYCH: Appropriate affect, tone, and pace of words    Last Comprehensive Metabolic Panel:  Lab Results   Component Value Date     11/10/2024    POTASSIUM 4.2 11/10/2024    CHLORIDE 103 11/10/2024    CO2 20 (L) 11/10/2024    ANIONGAP 17 (H) 11/10/2024     (H) 11/10/2024    BUN 13.8 11/10/2024    CR 0.98 11/10/2024    GFRESTIMATED >90 11/10/2024    JEOVANY 9.1 11/10/2024         Hospital Outpatient Visit on 02/13/2025   Component Date Value Ref Range Status    Upper GI Endoscopy 02/13/2025    Final                    Value:[facility]  [address] [place] [address]., [place] [address] [phone]      Endoscopy Department  _______________________________________________________________________________  Patient Name: Joseph Barthel          Procedure Date: 2/13/2025 1:16 PM  MRN: [id number]                       Account Number: [id number]  YOB: 1985              Admit Type: Outpatient  Age: 39                               Room: [facility] PROCEDURE ROOM 03  Gender: Male                          Note Status: Finalized  Attending MD: IAN BATES MD,   Total Sedation Time:   _______________________________________________________________________________     Procedure:             Upper GI endoscopy  Indications:           Iron deficiency anemia, Crohn's disease, Nausea with                          vomiting  Providers:             IAN BATES MD, Julia Reagan, JB,                          Bella Coleman RN, Renée Zurita CRNA, Mecca cook, Technologist  Referring MD:          AILEEN SMITH  Requesting Provider:   AILEEN SMITH  Medicines:             Monitored Anesthesia Care  Complications:         No immediate complications.  _______________________________________________________________________________  Procedure:             Pre-Anesthesia Assessment:                         - See EPIC H and P note                         After obtaining informed consent, the endoscope was                          passed under direct vision. Throughout the procedure,                          the patient's blood pressure, pulse, and oxygen                          saturations were monitored continuously. The Endoscope                          was introduced through the mouth, and advanced to the                          third part of duodenum. The upper GI endoscopy was                          accomplished without difficulty. The patient tolerated                          the procedure well.                                                                                                              Findings:       The examined esophagus was normal.       The Z-line was regular and was found 40 cm from the incisors.       The gastroesophageal flap valve was visualized endoscopically and        classified as Hill Grade III (minimal fold, loose to endoscope, hiatal        hernia likely).       Patchy moderately erythematous mucosa with friability and scant hematin        was found in the gastric body and in the gastric antrum. Stomach        otherwise normal. Biopsies were taken with a cold forceps for histology.       Patchy moderately erythematous mucosa without active bleeding and with        no stigmata of bleeding was found in the duodenal bulb. Biopsies for        histology were taken with a cold forceps for evaluation of celiac        disease.       The exam of the duodenum was otherwise normal.       Biopsies for histology were taken with a cold forceps in the duodenal        bulb, in the first portion of the duodenu                          m, in the second portion of the        duodenum and in the third portion of the duodenum for evaluation of        celiac disease.                                                                                   Impression:            - Normal esophagus.                         - Z-line regular, 40 cm from the incisors.                         - Gastroesophageal flap valve classified as Hill Grade                          III (minimal fold, loose to endoscope, hiatal hernia                          likely).                         - Erythematous mucosa in the gastric body and antrum.                          Biopsied.                         - Stomach otherwise normal. Biopsied.                         - Erythematous duodenopathy. Biopsied.                         - Biopsies were taken with a cold forceps for                          evaluation of celiac disease.  Recommendation:        -  Await pathology results.                         - Proceed with pouchoscopy today                                                   - Consider trial of PPI for 2 months pending biopsies                                                                                     Electronically Signed by: Dr. Jam Bates  ___________________________  JAM BATES MD  2/13/2025 1:52:32 PM  I was physically present for the entire viewing portion of the exam.  __________________________  Signature of teaching physician  JAM BATES MD  Number of Addenda: 0    Note Initiated On: 2/13/2025 1:16 PM  Scope In:  Scope Out: 1:46:36 PM      Case Report 02/13/2025    Final                    Value:Surgical Pathology Report                         Case: [id number]                                  Authorizing Provider:  Jam Bates,  Collected:           02/13/2025 01:41 PM                                 MD                                                                           Ordering Location:     [facility] OR  Received:            02/13/2025 02:43 PM                                 [place]                                                                  Pathologist:           Nery Fulton MD                                                    Specimens:   A) - Small Intestine, Duodenum, duodenum biopsy                                                     B) - Stomach, gastric biopsy                                                                        C) - Small Intestine, Ileum, pre-pouch ileum biopsy                                                 D) - Small Intestine, stricture at pouch entrance biopsy                                                                      E) - Other, rectal pouch biopsy                                                                     F) - Other, rectal cuff biopsy                                                             Addendum  02/13/2025    Final                    Value:This addendum is to report CMV immunohistochemical results. Immunostain for CMV, with appropriate controls, on blocks C1, D1 and F1 are negative for cytomegalovirus.  Analyte Specific Reagents (ASRs) are used in many laboratory tests necessary for standard medical care and generally do not require FDA approval. This test was developed and its performance characteristics determined by [facility]. It has not been cleared or approved by the U.S. Food and Drug Administration.  [facility] are certified for the performance of high-complexity clinical testing under the Clinical Laboratory Improvement Amendments of 1988 (CLIA), and in keeping with the certification requirements, the laboratory has verified this test's accuracy, precision and/or validity of the method.        Final Diagnosis 02/13/2025    Final                    Value:A. Duodenum, biopsy:  - Duodenal mucosa with preserved villi and no significant histologic abnormality; features celiac sprue or peptic duodenitis are not identified    B. Gastric, biopsy:  - Gastric mucosa with mild chronic inflammation  - No H. pylori-like organisms identified on routine staining  - Negative for intestinal metaplasia or dysplasia     C. Pre-pouch ileum, biopsy:  - Ileal mucosa with active ileitis and ulceration  - Negative for dysplasia or malignancy    D. Stricture at pouch entrance, biopsy:  - Colonic mucosa with active inflammation and ulceration  - Negative for dysplasia or malignancy    E. Rectal pouch, biopsy:  - Colonic mucosa with expansion of lamina propria with lymphoplasmacytes   - Negative for active colitis or dysplasia or malignancy    F. Rectal cuff, biopsy:  - Colonic  mucosa with focal active colitis   - Negative for dysplasia or malignancy      Comment 02/13/2025    Final                    Value:CMV immunohistochemical stain is in progress, on specimens  "C, D and F, results will be reported in an addendum.      Clinical Information 02/13/2025    Final                    Value:Procedure:  Esophagoscopy, gastroscopy, duodenoscopy (EGD), biopsy  Pouchoscopy with biopsy  Pre-op Diagnosis: Crohn's disease of large intestine with complication (H) [K50.119]  Post-op Diagnosis: K50.119 - Crohn's disease of large intestine with complication (H) [ICD-10-CM]      Gross Description 02/13/2025    Final                    Value:A(1). Small Intestine, Duodenum, duodenum biopsy:  The specimen is received in formalin with proper patient identification, labeled \"duodenum biopsy\".  The specimen consists of 7 irregular tan soft tissues up to 0.5 cm.  The specimen is submitted entirely in cassette A1.   B(2). Stomach, gastric biopsy:  The specimen is received in formalin with proper patient identification, labeled \"gastric biopsy\".  The specimen consists of 5 irregular tan soft tissues up to 0.6 cm.  The specimen is submitted entirely in cassette B1.   C(3). Small Intestine, Ileum, pre-pouch ileum biopsy:  The specimen is received in formalin with proper patient identification, labeled \"pre-pouch ileum biopsy\".  The specimen consists of 9 irregular tan soft tissues up to 0.5 cm.  The specimen is submitted entirely in cassette C1.   D(4). Small Intestine, stricture at pouch entrance biopsy:  The specimen is received in formalin with proper patient identification, labeled \"stricture at pouch) biopsy\".  The specimen                           consists of 6 irregular tan soft tissues up to 0.5 cm.  The specimen is submitted entirely in cassette D1.   E(5). Other, rectal pouch biopsy:  The specimen is received in formalin with proper patient identification, labeled \"rectal pouch biopsy\".  The specimen consists of 4 irregular tan soft tissues up to 0.4 cm.  The specimen is submitted entirely in cassette E1.   F(6). Other, rectal cuff biopsy:  The specimen is received in formalin with proper " "patient identification, labeled \"rectal cuff biopsy\".  The specimen consists of 5 irregular tan soft tissues up to 0.4 cm.  The specimen is submitted entirely in cassette F1.       Microscopic Description 02/13/2025    Final                    Value:Microscopic examination is performed.    I have personally reviewed all specimens and or slides, including the listed special stains, and used them with my medical judgement to determine the final diagnosis.      Performing Labs 02/13/2025    Final                    Value:The technical component of this testing was completed at [facility].    Stain controls for all stains resulted within this report have been reviewed and show appropriate reactivity.       POUCHOSCOPY 02/13/2025    Final                    Value:[facility]  [address] [place] [address]., [place] [id number] [phone]     Endoscopy Department  _______________________________________________________________________________  Patient Name: Joseph Barthel          Procedure Date: 2/13/2025 1:16 PM  MRN: [id number]                       Account Number: [id number]  YOB: 1985              Admit Type: Outpatient  Age: 39                               Room: [facility] PROCEDURE ROOM 03  Gender: Male                          Note Status: Finalized  Attending MD: IAN BATES MD,   Total Sedation Time:   _______________________________________________________________________________     Procedure:             Pouchoscopy  Indications:           History of total colectomy, history of IBD with total                          colectomy and J pouch. Has Crohn's Disease now of                          pre-pouch ileum. Evaluate for disease activity.  Providers:             IAN BATES MD  Referring MD:                                    AILEEN SMITH  Requesting Provider:   AILEEN SMITH  Medicines:            "  Monitored Anesthesia Care  Complications:         No immediate complications.  _______________________________________________________________________________  Procedure:             Pre-Anesthesia Assessment:                         - See EPIC H and P note                         After obtaining informed consent, the endoscope was                          passed under direct vision. Throughout the procedure,                          the patient's blood pressure, pulse, and oxygen                          saturations were monitored continuously. The                          Colonoscope was introduced through the ileoanal                          anastomosis via the anus and advanced to the ileoanal                          pouch and into the kathi-terminal ileum. The procedure                          was performed without difficulty. The patient                          tolerated the procedure                           well. The quality of the bowel                          preparation was adequate.                                                                                   Findings:       Skin tags were found on perianal exam. One dimple in fadi-anal region        that could be suggestive of old fistula tract       Localized inflammation, mild in severity and characterized by one        shallow ulceration (4mm in diameter) was found in the pre-pouch ileum        approximately 2-3 cm proximal to the J pouch inlet. Very mild luminal        narrowing in this region. Biopsies were taken with a cold forceps for        histology. The remainder of the ileum proximal to 2-3 cm from the pouch        inlet looked normal.       The pouch inlet contained a benign-appearing, intrinsic severe stenosis        measuring 1 cm (in length) x 6-7 mm (inner diameter) that was traversed.        This was associated with ulceration - deep serpigenous ulcers. This        adult upper endoscope was used to dilate this t                           o 10 mm as the scope was        passed through this stenosis. Biopsies were taken with a cold forceps        for histology.       The ileoanal pouch was otherwise normal. Biopsies were taken with a cold        forceps for histology.       There was a rectal cuff beginning at at 1 cm from the anal verge,        characterized by healthy appearing mucosa. The cuff extended 1 cm in        length. Biopsies were taken with a cold forceps for histology for        dysplasia surveillance.                                                                                   Impression:            - Perianal skin tags found on perianal exam.                         - Inflammation was found in the distal pre-pouch ileum                          and pouch inlet secondary to Crohn's disease with                          ileitis. Biopsied.                         - Stricture with ulceration at the pouch inlet.                          Biopsied.                         - The ileoanal pouch is otherwise no                          rmal. Biopsied.                         - Rectal cuff with healthy appearing mucosa seen.                          Biopsied.                         There is inflammation in the distal ileum and pouch                          inlet conistent with Crohn's Disease. There is severe                          stricturing at the inlet that was dilated to 10 mm                          today. Will discuss starting biologic therapy and                          repeat pouchoscopy for further dilation.  Recommendation:        - Discharge patient to home (with escort).                         - Resume previous diet.                         - Continue present medications.                         - Await pathology results.                         - Return to GI clinic.                         - See above discussion                                                                                     Electronically  Signed by: Dr. Jam Bates  ___________________________  JAM BATES MD                            2/13/2025 2:36:34 PM  I was physically present for the entire viewing portion of the exam.  __________________________  Signature of teaching physician  JAM BATES MD  Number of Addenda: 0    Note Initiated On: 2/13/2025 1:16 PM  Scope In: 1:55:34 PM  Scope Out: 2:24:23 PM           Video-Visit Details    Type of service:  Video Visit   Originating Location (pt. Location): Home    Distant Location (provider location):  On-site  Platform used for Video Visit: [facility]  Signed Electronically by: ZAYDA Dumont CNP

## 2025-03-07 LAB
ALBUMIN SERPL BCG-MCNC: 3.4 G/DL (ref 3.5–5.2)
ALP SERPL-CCNC: 79 U/L (ref 40–150)
ALT SERPL W P-5'-P-CCNC: 13 U/L (ref 0–70)
ANION GAP SERPL CALCULATED.3IONS-SCNC: 7 MMOL/L (ref 7–15)
AST SERPL W P-5'-P-CCNC: 20 U/L (ref 0–45)
BILIRUB SERPL-MCNC: 0.3 MG/DL
BUN SERPL-MCNC: 8.1 MG/DL (ref 6–20)
C TRACH DNA SPEC QL PROBE+SIG AMP: NEGATIVE
CALCIUM SERPL-MCNC: 8.8 MG/DL (ref 8.8–10.4)
CHLORIDE SERPL-SCNC: 103 MMOL/L (ref 98–107)
CREAT SERPL-MCNC: 0.99 MG/DL (ref 0.67–1.17)
EGFRCR SERPLBLD CKD-EPI 2021: >90 ML/MIN/1.73M2
GLUCOSE SERPL-MCNC: 81 MG/DL (ref 70–99)
HCO3 SERPL-SCNC: 29 MMOL/L (ref 22–29)
HCV AB SERPL QL IA: NONREACTIVE
HIV 1+2 AB+HIV1 P24 AG SERPL QL IA: NONREACTIVE
N GONORRHOEA DNA SPEC QL NAA+PROBE: NEGATIVE
POTASSIUM SERPL-SCNC: 4 MMOL/L (ref 3.4–5.3)
PROT SERPL-MCNC: 6.1 G/DL (ref 6.4–8.3)
SODIUM SERPL-SCNC: 139 MMOL/L (ref 135–145)
SPECIMEN TYPE: NORMAL

## 2025-03-07 NOTE — TELEPHONE ENCOUNTER
Called [facility] [place]. They are not the pbm. Called [facility]- they are not the pbm either. Rep states PBM is [facility] [employer]. [facility] is who I filled the PA with initially.  said there is no PA in progress. They are faxing over PA forms.. I will complete them, Again, and fax back requesting urgent determination.

## 2025-03-07 NOTE — TELEPHONE ENCOUNTER
Prior Authorization Approval    Medication: SKYRIZI 360 MG/2.4ML SC SOCT  Authorization Effective Date: 2/5/2025  Authorization Expiration Date: 3/7/2026  Approved Dose/Quantity: 2.4/56  Reference #: [id number]   Insurance Company: [employer] - Phone [phone] Fax [phone]  Expected CoPay: $ 0  CoPay Card Available:      Financial Assistance Needed:   Which Pharmacy is filling the prescription: [facility] - [place], [place] - [address]  Pharmacy Notified: no  Patient Notified: no

## 2025-03-08 LAB — T PALLIDUM AB SER QL: NONREACTIVE

## 2025-03-13 ENCOUNTER — INFUSION THERAPY VISIT (OUTPATIENT)
Dept: INFUSION THERAPY | Facility: CLINIC | Age: 40
End: 2025-03-13
Attending: INTERNAL MEDICINE
Payer: COMMERCIAL

## 2025-03-13 VITALS
TEMPERATURE: 98.6 F | DIASTOLIC BLOOD PRESSURE: 80 MMHG | HEART RATE: 80 BPM | OXYGEN SATURATION: 100 % | SYSTOLIC BLOOD PRESSURE: 119 MMHG | RESPIRATION RATE: 16 BRPM

## 2025-03-13 DIAGNOSIS — D50.9 ANEMIA, IRON DEFICIENCY: ICD-10-CM

## 2025-03-13 DIAGNOSIS — K50.00 CROHN'S DISEASE OF SMALL INTESTINE (H): ICD-10-CM

## 2025-03-13 DIAGNOSIS — K50.018 CROHN'S DISEASE OF SMALL INTESTINE WITH OTHER COMPLICATION (H): Primary | ICD-10-CM

## 2025-03-13 DIAGNOSIS — D50.8 OTHER IRON DEFICIENCY ANEMIA: ICD-10-CM

## 2025-03-13 PROCEDURE — 258N000003 HC RX IP 258 OP 636: Performed by: INTERNAL MEDICINE

## 2025-03-13 PROCEDURE — 250N000011 HC RX IP 250 OP 636: Mod: JZ | Performed by: INTERNAL MEDICINE

## 2025-03-13 RX ORDER — ALBUTEROL SULFATE 0.83 MG/ML
2.5 SOLUTION RESPIRATORY (INHALATION)
OUTPATIENT
Start: 2025-03-15

## 2025-03-13 RX ORDER — HEPARIN SODIUM (PORCINE) LOCK FLUSH IV SOLN 100 UNIT/ML 100 UNIT/ML
5 SOLUTION INTRAVENOUS
OUTPATIENT
Start: 2025-03-15

## 2025-03-13 RX ORDER — HEPARIN SODIUM,PORCINE 10 UNIT/ML
5-20 VIAL (ML) INTRAVENOUS DAILY PRN
OUTPATIENT
Start: 2025-03-15

## 2025-03-13 RX ORDER — METHYLPREDNISOLONE SODIUM SUCCINATE 40 MG/ML
40 INJECTION INTRAMUSCULAR; INTRAVENOUS
Start: 2025-03-15

## 2025-03-13 RX ORDER — EPINEPHRINE 1 MG/ML
0.3 INJECTION, SOLUTION INTRAMUSCULAR; SUBCUTANEOUS EVERY 5 MIN PRN
OUTPATIENT
Start: 2025-03-15

## 2025-03-13 RX ORDER — DIPHENHYDRAMINE HYDROCHLORIDE 50 MG/ML
25 INJECTION, SOLUTION INTRAMUSCULAR; INTRAVENOUS
Start: 2025-03-15

## 2025-03-13 RX ORDER — METHYLPREDNISOLONE SODIUM SUCCINATE 125 MG/2ML
125 INJECTION INTRAMUSCULAR; INTRAVENOUS ONCE
Status: COMPLETED | OUTPATIENT
Start: 2025-03-13 | End: 2025-03-13

## 2025-03-13 RX ORDER — ALBUTEROL SULFATE 90 UG/1
1-2 INHALANT RESPIRATORY (INHALATION)
Start: 2025-03-15

## 2025-03-13 RX ORDER — DIPHENHYDRAMINE HYDROCHLORIDE 50 MG/ML
50 INJECTION, SOLUTION INTRAMUSCULAR; INTRAVENOUS
Start: 2025-03-15

## 2025-03-13 RX ORDER — MEPERIDINE HYDROCHLORIDE 25 MG/ML
25 INJECTION INTRAMUSCULAR; INTRAVENOUS; SUBCUTANEOUS
OUTPATIENT
Start: 2025-03-15

## 2025-03-13 RX ORDER — METHYLPREDNISOLONE SODIUM SUCCINATE 125 MG/2ML
125 INJECTION INTRAMUSCULAR; INTRAVENOUS ONCE
Start: 2025-03-15 | End: 2025-03-15

## 2025-03-13 RX ADMIN — METHYLPREDNISOLONE SODIUM SUCCINATE 125 MG: 125 INJECTION, POWDER, FOR SOLUTION INTRAMUSCULAR; INTRAVENOUS at 12:09

## 2025-03-13 RX ADMIN — FAMOTIDINE 20 MG: 10 INJECTION INTRAVENOUS at 12:11

## 2025-03-13 RX ADMIN — SODIUM CHLORIDE 250 ML: 0.9 INJECTION, SOLUTION INTRAVENOUS at 12:30

## 2025-03-13 RX ADMIN — IRON SUCROSE 200 MG: 20 INJECTION, SOLUTION INTRAVENOUS at 12:30

## 2025-03-13 NOTE — PROGRESS NOTES
Infusion Nursing Note:  Joseph R Barthel presents today for Venofer 1 of 5.    Patient seen by provider today: No   present during visit today: Not Applicable.    Note: Pt pre-medicated prior to venofer infusion.       Intravenous Access:  Peripheral IV placed.    Treatment Conditions:   Latest Reference Range & Units 02/13/25 11:37   Iron 61 - 157 ug/dL 14 (L)   Hemoglobin 13.3 - 17.7 g/dL 10.9 (L)   (L): Data is abnormally low  Results reviewed, labs MET treatment parameters, ok to proceed with treatment.      Post Infusion Assessment:  Patient tolerated infusion without incident.  Pt monitored for 15 minutes post venofer per protocol.    Blood return noted pre and post infusion.  Site patent and intact, free from redness, edema or discomfort.  No evidence of extravasations.  Access discontinued per protocol.       Discharge Plan:   Discharge instructions reviewed with: Patient.  Patient and/or family verbalized understanding of discharge instructions and all questions answered.  AVS to patient via PayPayHART.  Patient will return 3/20 for next appointment.   Patient discharged in stable condition accompanied by: self.  Departure Mode: Ambulatory.      Andrey Doll RN

## 2025-03-24 ENCOUNTER — INFUSION THERAPY VISIT (OUTPATIENT)
Dept: INFUSION THERAPY | Facility: CLINIC | Age: 40
End: 2025-03-24
Attending: INTERNAL MEDICINE
Payer: COMMERCIAL

## 2025-03-24 VITALS
RESPIRATION RATE: 16 BRPM | DIASTOLIC BLOOD PRESSURE: 68 MMHG | HEART RATE: 72 BPM | TEMPERATURE: 97.2 F | SYSTOLIC BLOOD PRESSURE: 107 MMHG | OXYGEN SATURATION: 98 %

## 2025-03-24 DIAGNOSIS — D50.8 OTHER IRON DEFICIENCY ANEMIA: ICD-10-CM

## 2025-03-24 DIAGNOSIS — K50.018 CROHN'S DISEASE OF SMALL INTESTINE WITH OTHER COMPLICATION (H): Primary | ICD-10-CM

## 2025-03-24 PROCEDURE — 250N000011 HC RX IP 250 OP 636: Mod: JZ | Performed by: INTERNAL MEDICINE

## 2025-03-24 PROCEDURE — 258N000003 HC RX IP 258 OP 636: Performed by: INTERNAL MEDICINE

## 2025-03-24 PROCEDURE — 96374 THER/PROPH/DIAG INJ IV PUSH: CPT

## 2025-03-24 PROCEDURE — 96375 TX/PRO/DX INJ NEW DRUG ADDON: CPT

## 2025-03-24 RX ORDER — ALBUTEROL SULFATE 0.83 MG/ML
2.5 SOLUTION RESPIRATORY (INHALATION)
Status: CANCELLED | OUTPATIENT
Start: 2025-03-25

## 2025-03-24 RX ORDER — METHYLPREDNISOLONE SODIUM SUCCINATE 125 MG/2ML
125 INJECTION INTRAMUSCULAR; INTRAVENOUS ONCE
Status: CANCELLED
Start: 2025-03-25 | End: 2025-03-25

## 2025-03-24 RX ORDER — HEPARIN SODIUM (PORCINE) LOCK FLUSH IV SOLN 100 UNIT/ML 100 UNIT/ML
5 SOLUTION INTRAVENOUS
Status: CANCELLED | OUTPATIENT
Start: 2025-03-25

## 2025-03-24 RX ORDER — METHYLPREDNISOLONE SODIUM SUCCINATE 125 MG/2ML
125 INJECTION INTRAMUSCULAR; INTRAVENOUS ONCE
Status: COMPLETED | OUTPATIENT
Start: 2025-03-24 | End: 2025-03-24

## 2025-03-24 RX ORDER — MEPERIDINE HYDROCHLORIDE 25 MG/ML
25 INJECTION INTRAMUSCULAR; INTRAVENOUS; SUBCUTANEOUS
Status: CANCELLED | OUTPATIENT
Start: 2025-03-25

## 2025-03-24 RX ORDER — HEPARIN SODIUM,PORCINE 10 UNIT/ML
5-20 VIAL (ML) INTRAVENOUS DAILY PRN
Status: CANCELLED | OUTPATIENT
Start: 2025-03-25

## 2025-03-24 RX ORDER — EPINEPHRINE 1 MG/ML
0.3 INJECTION, SOLUTION INTRAMUSCULAR; SUBCUTANEOUS EVERY 5 MIN PRN
Status: CANCELLED | OUTPATIENT
Start: 2025-03-25

## 2025-03-24 RX ORDER — DIPHENHYDRAMINE HYDROCHLORIDE 50 MG/ML
25 INJECTION, SOLUTION INTRAMUSCULAR; INTRAVENOUS
Status: CANCELLED
Start: 2025-03-25

## 2025-03-24 RX ORDER — DIPHENHYDRAMINE HYDROCHLORIDE 50 MG/ML
50 INJECTION, SOLUTION INTRAMUSCULAR; INTRAVENOUS
Status: CANCELLED
Start: 2025-03-25

## 2025-03-24 RX ORDER — METHYLPREDNISOLONE SODIUM SUCCINATE 40 MG/ML
40 INJECTION INTRAMUSCULAR; INTRAVENOUS
Status: CANCELLED
Start: 2025-03-25

## 2025-03-24 RX ORDER — ALBUTEROL SULFATE 90 UG/1
1-2 INHALANT RESPIRATORY (INHALATION)
Status: CANCELLED
Start: 2025-03-25

## 2025-03-24 RX ADMIN — SODIUM CHLORIDE 100 ML: 9 INJECTION, SOLUTION INTRAVENOUS at 14:37

## 2025-03-24 RX ADMIN — IRON SUCROSE 200 MG: 20 INJECTION, SOLUTION INTRAVENOUS at 15:00

## 2025-03-24 RX ADMIN — FAMOTIDINE 20 MG: 10 INJECTION INTRAVENOUS at 14:42

## 2025-03-24 RX ADMIN — METHYLPREDNISOLONE SODIUM SUCCINATE 125 MG: 125 INJECTION, POWDER, FOR SOLUTION INTRAMUSCULAR; INTRAVENOUS at 14:36

## 2025-03-24 NOTE — PROGRESS NOTES
Infusion Nursing Note:  Joseph R Barthel presents today for venofer #2/5.    Patient seen by provider today: No   present during visit today: Not Applicable.    Note: Pre-medications given 15 minutes prior to Venofer as ordered.      Intravenous Access:  Peripheral IV placed.    Treatment Conditions:  Not Applicable.      Post Infusion Assessment:  Patient tolerated infusion without incident.  Patient observed for 15 minutes post Venofer per protocol.  Blood return noted pre and post infusion.  Site patent and intact, free from redness, edema or discomfort.  No evidence of extravasations.  Access discontinued per protocol.       Discharge Plan:   Discharge instructions reviewed with: Patient.  Patient and/or family verbalized understanding of discharge instructions and all questions answered.  AVS to patient via DissolveT.  Patient will return 3/27/25 for next appointment.   Patient discharged in stable condition accompanied by: self  Departure Mode: Ambulatory.      Mandy Whittaker RN

## 2025-03-27 ENCOUNTER — INFUSION THERAPY VISIT (OUTPATIENT)
Dept: INFUSION THERAPY | Facility: CLINIC | Age: 40
End: 2025-03-27
Attending: INTERNAL MEDICINE
Payer: COMMERCIAL

## 2025-03-27 VITALS
RESPIRATION RATE: 16 BRPM | OXYGEN SATURATION: 100 % | TEMPERATURE: 97.4 F | SYSTOLIC BLOOD PRESSURE: 106 MMHG | DIASTOLIC BLOOD PRESSURE: 68 MMHG | HEART RATE: 96 BPM

## 2025-03-27 DIAGNOSIS — K50.018 CROHN'S DISEASE OF SMALL INTESTINE WITH OTHER COMPLICATION (H): Primary | ICD-10-CM

## 2025-03-27 DIAGNOSIS — D50.8 OTHER IRON DEFICIENCY ANEMIA: ICD-10-CM

## 2025-03-27 PROCEDURE — 250N000011 HC RX IP 250 OP 636: Performed by: INTERNAL MEDICINE

## 2025-03-27 PROCEDURE — 258N000003 HC RX IP 258 OP 636: Performed by: INTERNAL MEDICINE

## 2025-03-27 RX ORDER — ALBUTEROL SULFATE 90 UG/1
1-2 INHALANT RESPIRATORY (INHALATION)
Start: 2025-03-28

## 2025-03-27 RX ORDER — DIPHENHYDRAMINE HYDROCHLORIDE 50 MG/ML
25 INJECTION, SOLUTION INTRAMUSCULAR; INTRAVENOUS
Start: 2025-03-28

## 2025-03-27 RX ORDER — METHYLPREDNISOLONE SODIUM SUCCINATE 40 MG/ML
40 INJECTION INTRAMUSCULAR; INTRAVENOUS
Start: 2025-03-28

## 2025-03-27 RX ORDER — MEPERIDINE HYDROCHLORIDE 25 MG/ML
25 INJECTION INTRAMUSCULAR; INTRAVENOUS; SUBCUTANEOUS
OUTPATIENT
Start: 2025-03-28

## 2025-03-27 RX ORDER — HEPARIN SODIUM (PORCINE) LOCK FLUSH IV SOLN 100 UNIT/ML 100 UNIT/ML
5 SOLUTION INTRAVENOUS
OUTPATIENT
Start: 2025-03-28

## 2025-03-27 RX ORDER — HEPARIN SODIUM,PORCINE 10 UNIT/ML
5-20 VIAL (ML) INTRAVENOUS DAILY PRN
OUTPATIENT
Start: 2025-03-28

## 2025-03-27 RX ORDER — EPINEPHRINE 1 MG/ML
0.3 INJECTION, SOLUTION INTRAMUSCULAR; SUBCUTANEOUS EVERY 5 MIN PRN
OUTPATIENT
Start: 2025-03-28

## 2025-03-27 RX ORDER — METHYLPREDNISOLONE SODIUM SUCCINATE 125 MG/2ML
125 INJECTION INTRAMUSCULAR; INTRAVENOUS ONCE
Status: COMPLETED | OUTPATIENT
Start: 2025-03-27 | End: 2025-03-27

## 2025-03-27 RX ORDER — ALBUTEROL SULFATE 0.83 MG/ML
2.5 SOLUTION RESPIRATORY (INHALATION)
OUTPATIENT
Start: 2025-03-28

## 2025-03-27 RX ORDER — METHYLPREDNISOLONE SODIUM SUCCINATE 125 MG/2ML
125 INJECTION INTRAMUSCULAR; INTRAVENOUS ONCE
Start: 2025-03-28 | End: 2025-03-28

## 2025-03-27 RX ORDER — DIPHENHYDRAMINE HYDROCHLORIDE 50 MG/ML
50 INJECTION, SOLUTION INTRAMUSCULAR; INTRAVENOUS
Start: 2025-03-28

## 2025-03-27 RX ADMIN — IRON SUCROSE 200 MG: 20 INJECTION, SOLUTION INTRAVENOUS at 10:27

## 2025-03-27 RX ADMIN — FAMOTIDINE 20 MG: 10 INJECTION INTRAVENOUS at 10:10

## 2025-03-27 RX ADMIN — SODIUM CHLORIDE 250 ML: 0.9 INJECTION, SOLUTION INTRAVENOUS at 10:09

## 2025-03-27 RX ADMIN — METHYLPREDNISOLONE SODIUM SUCCINATE 125 MG: 125 INJECTION, POWDER, FOR SOLUTION INTRAMUSCULAR; INTRAVENOUS at 10:11

## 2025-03-27 NOTE — PROGRESS NOTES
Infusion Nursing Note:  Joseph R Barthel presents today for Venofer #3 of 5.    Patient seen by provider today: No   present during visit today: Not Applicable.    Note: Pre-medicated with solu-medrol and pepcid as ordered.      Intravenous Access:  Peripheral IV placed.    Treatment Conditions:  Not Applicable.      Post Infusion Assessment:  Patient tolerated infusion without incident.  Patient observed for 15 minutes post venofer per protocol.  Blood return noted pre and post infusion.  Site patent and intact, free from redness, edema or discomfort.  No evidence of extravasations.  Access discontinued per protocol.       Discharge Plan:   Discharge instructions reviewed with: Patient.  Patient and/or family verbalized understanding of discharge instructions and all questions answered.  AVS to patient via [employer].  Patient will return 3/28 for next appointment.   Patient discharged in stable condition accompanied by: self.  Departure Mode: Ambulatory.      Fadumo Peralta RN

## 2025-03-28 PROCEDURE — 80076 HEPATIC FUNCTION PANEL: CPT | Performed by: INTERNAL MEDICINE

## 2025-03-31 ENCOUNTER — INFUSION THERAPY VISIT (OUTPATIENT)
Dept: INFUSION THERAPY | Facility: CLINIC | Age: 40
End: 2025-03-31
Attending: INTERNAL MEDICINE
Payer: COMMERCIAL

## 2025-03-31 VITALS
OXYGEN SATURATION: 100 % | HEART RATE: 82 BPM | SYSTOLIC BLOOD PRESSURE: 99 MMHG | TEMPERATURE: 97.7 F | RESPIRATION RATE: 16 BRPM | DIASTOLIC BLOOD PRESSURE: 67 MMHG

## 2025-03-31 DIAGNOSIS — K50.018 CROHN'S DISEASE OF SMALL INTESTINE WITH OTHER COMPLICATION (H): Primary | ICD-10-CM

## 2025-03-31 DIAGNOSIS — D50.8 OTHER IRON DEFICIENCY ANEMIA: ICD-10-CM

## 2025-03-31 PROCEDURE — 258N000003 HC RX IP 258 OP 636: Performed by: INTERNAL MEDICINE

## 2025-03-31 PROCEDURE — 96375 TX/PRO/DX INJ NEW DRUG ADDON: CPT

## 2025-03-31 PROCEDURE — 250N000011 HC RX IP 250 OP 636: Performed by: INTERNAL MEDICINE

## 2025-03-31 PROCEDURE — 96374 THER/PROPH/DIAG INJ IV PUSH: CPT

## 2025-03-31 RX ORDER — METHYLPREDNISOLONE SODIUM SUCCINATE 125 MG/2ML
125 INJECTION INTRAMUSCULAR; INTRAVENOUS ONCE
Start: 2025-04-02 | End: 2025-04-02

## 2025-03-31 RX ORDER — HEPARIN SODIUM,PORCINE 10 UNIT/ML
5-20 VIAL (ML) INTRAVENOUS DAILY PRN
OUTPATIENT
Start: 2025-04-02

## 2025-03-31 RX ORDER — METHYLPREDNISOLONE SODIUM SUCCINATE 125 MG/2ML
125 INJECTION INTRAMUSCULAR; INTRAVENOUS ONCE
Status: COMPLETED | OUTPATIENT
Start: 2025-03-31 | End: 2025-03-31

## 2025-03-31 RX ORDER — DIPHENHYDRAMINE HYDROCHLORIDE 50 MG/ML
25 INJECTION, SOLUTION INTRAMUSCULAR; INTRAVENOUS
Start: 2025-04-02

## 2025-03-31 RX ORDER — METHYLPREDNISOLONE SODIUM SUCCINATE 40 MG/ML
40 INJECTION INTRAMUSCULAR; INTRAVENOUS
Start: 2025-04-02

## 2025-03-31 RX ORDER — EPINEPHRINE 1 MG/ML
0.3 INJECTION, SOLUTION INTRAMUSCULAR; SUBCUTANEOUS EVERY 5 MIN PRN
OUTPATIENT
Start: 2025-04-02

## 2025-03-31 RX ORDER — ALBUTEROL SULFATE 0.83 MG/ML
2.5 SOLUTION RESPIRATORY (INHALATION)
OUTPATIENT
Start: 2025-04-02

## 2025-03-31 RX ORDER — ALBUTEROL SULFATE 90 UG/1
1-2 INHALANT RESPIRATORY (INHALATION)
Start: 2025-04-02

## 2025-03-31 RX ORDER — MEPERIDINE HYDROCHLORIDE 25 MG/ML
25 INJECTION INTRAMUSCULAR; INTRAVENOUS; SUBCUTANEOUS
OUTPATIENT
Start: 2025-04-02

## 2025-03-31 RX ORDER — HEPARIN SODIUM (PORCINE) LOCK FLUSH IV SOLN 100 UNIT/ML 100 UNIT/ML
5 SOLUTION INTRAVENOUS
OUTPATIENT
Start: 2025-04-02

## 2025-03-31 RX ORDER — DIPHENHYDRAMINE HYDROCHLORIDE 50 MG/ML
50 INJECTION, SOLUTION INTRAMUSCULAR; INTRAVENOUS
Start: 2025-04-02

## 2025-03-31 RX ADMIN — FAMOTIDINE 20 MG: 10 INJECTION INTRAVENOUS at 14:52

## 2025-03-31 RX ADMIN — METHYLPREDNISOLONE SODIUM SUCCINATE 125 MG: 125 INJECTION, POWDER, FOR SOLUTION INTRAMUSCULAR; INTRAVENOUS at 14:52

## 2025-03-31 RX ADMIN — SODIUM CHLORIDE 250 ML: 0.9 INJECTION, SOLUTION INTRAVENOUS at 14:54

## 2025-03-31 RX ADMIN — IRON SUCROSE 200 MG: 20 INJECTION, SOLUTION INTRAVENOUS at 15:20

## 2025-03-31 NOTE — PROGRESS NOTES
Infusion Nursing Note:  Joseph R Barthel presents today for Venofer 200 #4.    Patient seen by provider today: No   present during visit today: Not Applicable.    Note: Premeds given 15 min prior to Venofer.      Intravenous Access:  Peripheral IV placed.    Treatment Conditions:  Not Applicable.      Post Infusion Assessment:  Patient tolerated infusion without incident.  Patient observed for 15 minutes post Venofer per protocol.  Blood return noted pre and post infusion.  Site patent and intact, free from redness, edema or discomfort.  No evidence of extravasations.  Access discontinued per protocol.       Discharge Plan:   Discharge instructions reviewed with: Patient.  Patient and/or family verbalized understanding of discharge instructions and all questions answered.  AVS to patient via [employer].  Patient will return 4/9/25 for next appointment.   Patient discharged in stable condition accompanied by: self.  Departure Mode: Ambulatory.      Fadumo Suarez RN

## 2025-04-09 ENCOUNTER — INFUSION THERAPY VISIT (OUTPATIENT)
Dept: INFUSION THERAPY | Facility: CLINIC | Age: 40
End: 2025-04-09
Attending: INTERNAL MEDICINE
Payer: COMMERCIAL

## 2025-04-09 VITALS
SYSTOLIC BLOOD PRESSURE: 98 MMHG | HEART RATE: 69 BPM | RESPIRATION RATE: 16 BRPM | TEMPERATURE: 98.5 F | OXYGEN SATURATION: 100 % | DIASTOLIC BLOOD PRESSURE: 59 MMHG

## 2025-04-09 DIAGNOSIS — K50.018 CROHN'S DISEASE OF SMALL INTESTINE WITH OTHER COMPLICATION (H): Primary | ICD-10-CM

## 2025-04-09 DIAGNOSIS — D50.8 OTHER IRON DEFICIENCY ANEMIA: ICD-10-CM

## 2025-04-09 PROCEDURE — 96375 TX/PRO/DX INJ NEW DRUG ADDON: CPT

## 2025-04-09 PROCEDURE — 250N000011 HC RX IP 250 OP 636: Performed by: INTERNAL MEDICINE

## 2025-04-09 PROCEDURE — 96374 THER/PROPH/DIAG INJ IV PUSH: CPT

## 2025-04-09 PROCEDURE — 258N000003 HC RX IP 258 OP 636: Performed by: INTERNAL MEDICINE

## 2025-04-09 RX ORDER — HEPARIN SODIUM (PORCINE) LOCK FLUSH IV SOLN 100 UNIT/ML 100 UNIT/ML
5 SOLUTION INTRAVENOUS
OUTPATIENT
Start: 2025-04-10

## 2025-04-09 RX ORDER — ALBUTEROL SULFATE 90 UG/1
1-2 INHALANT RESPIRATORY (INHALATION)
Start: 2025-04-10

## 2025-04-09 RX ORDER — METHYLPREDNISOLONE SODIUM SUCCINATE 40 MG/ML
40 INJECTION INTRAMUSCULAR; INTRAVENOUS
Start: 2025-04-10

## 2025-04-09 RX ORDER — DIPHENHYDRAMINE HYDROCHLORIDE 50 MG/ML
50 INJECTION, SOLUTION INTRAMUSCULAR; INTRAVENOUS
Start: 2025-04-10

## 2025-04-09 RX ORDER — EPINEPHRINE 1 MG/ML
0.3 INJECTION, SOLUTION INTRAMUSCULAR; SUBCUTANEOUS EVERY 5 MIN PRN
OUTPATIENT
Start: 2025-04-10

## 2025-04-09 RX ORDER — DIPHENHYDRAMINE HYDROCHLORIDE 50 MG/ML
25 INJECTION, SOLUTION INTRAMUSCULAR; INTRAVENOUS
Start: 2025-04-10

## 2025-04-09 RX ORDER — HEPARIN SODIUM,PORCINE 10 UNIT/ML
5-20 VIAL (ML) INTRAVENOUS DAILY PRN
OUTPATIENT
Start: 2025-04-10

## 2025-04-09 RX ORDER — MEPERIDINE HYDROCHLORIDE 25 MG/ML
25 INJECTION INTRAMUSCULAR; INTRAVENOUS; SUBCUTANEOUS
OUTPATIENT
Start: 2025-04-10

## 2025-04-09 RX ORDER — ALBUTEROL SULFATE 0.83 MG/ML
2.5 SOLUTION RESPIRATORY (INHALATION)
OUTPATIENT
Start: 2025-04-10

## 2025-04-09 RX ORDER — METHYLPREDNISOLONE SODIUM SUCCINATE 125 MG/2ML
125 INJECTION INTRAMUSCULAR; INTRAVENOUS ONCE
Status: COMPLETED | OUTPATIENT
Start: 2025-04-09 | End: 2025-04-09

## 2025-04-09 RX ORDER — METHYLPREDNISOLONE SODIUM SUCCINATE 125 MG/2ML
125 INJECTION INTRAMUSCULAR; INTRAVENOUS ONCE
Start: 2025-04-10 | End: 2025-04-10

## 2025-04-09 RX ADMIN — METHYLPREDNISOLONE SODIUM SUCCINATE 125 MG: 125 INJECTION, POWDER, FOR SOLUTION INTRAMUSCULAR; INTRAVENOUS at 15:34

## 2025-04-09 RX ADMIN — IRON SUCROSE 200 MG: 20 INJECTION, SOLUTION INTRAVENOUS at 15:54

## 2025-04-09 RX ADMIN — FAMOTIDINE 20 MG: 10 INJECTION, SOLUTION INTRAVENOUS at 15:37

## 2025-04-09 RX ADMIN — SODIUM CHLORIDE 250 ML: 0.9 INJECTION, SOLUTION INTRAVENOUS at 15:34

## 2025-04-09 NOTE — PROGRESS NOTES
Infusion Nursing Note:  Joseph R Barthel presents today for venofer #5/5.    Patient seen by provider today: No   present during visit today: Not Applicable.    Note: premeds given 15 minutes prior to venofer.      Intravenous Access:  Peripheral IV placed.    Treatment Conditions:  Not Applicable.      Post Infusion Assessment:  Patient tolerated infusion without incident.  Patient observed for 15 minutes post venofer per protocol.  Blood return noted pre and post infusion.  Site patent and intact, free from redness, edema or discomfort.  No evidence of extravasations.  Access discontinued per protocol.       Discharge Plan:   Discharge instructions reviewed with: Patient.  Patient and/or family verbalized understanding of discharge instructions and all questions answered.  AVS to patient via [employer].  Patient will return 4/29/25 for next appointment.   Patient discharged in stable condition accompanied by: self.  Departure Mode: Ambulatory.      Mandy Whittaker RN

## 2025-04-15 ENCOUNTER — OFFICE VISIT (OUTPATIENT)
Dept: GASTROENTEROLOGY | Facility: CLINIC | Age: 40
End: 2025-04-15
Attending: INTERNAL MEDICINE
Payer: COMMERCIAL

## 2025-04-15 VITALS
WEIGHT: 113.6 LBS | HEART RATE: 94 BPM | BODY MASS INDEX: 17.79 KG/M2 | SYSTOLIC BLOOD PRESSURE: 104 MMHG | DIASTOLIC BLOOD PRESSURE: 70 MMHG | OXYGEN SATURATION: 99 %

## 2025-04-15 DIAGNOSIS — K50.018 CROHN'S DISEASE OF SMALL INTESTINE WITH OTHER COMPLICATION (H): Primary | ICD-10-CM

## 2025-04-15 RX ORDER — BUDESONIDE 3 MG/1
CAPSULE, COATED PELLETS ORAL
Qty: 132 CAPSULE | Refills: 0 | Status: SHIPPED | OUTPATIENT
Start: 2025-04-15 | End: 2025-06-12

## 2025-04-15 ASSESSMENT — PAIN SCALES - GENERAL: PAINLEVEL_OUTOF10: MODERATE PAIN (4)

## 2025-04-15 NOTE — NURSING NOTE
Do you have a history of colon cancer in your immediate family? NO    If yes who: negative     And what age  were they diagnosed:       Chief Complaint   Patient presents with    Follow Up       Vitals:    04/15/25 1443   BP: 104/70   Pulse: 94   SpO2: 99%   Weight: 51.5 kg (113 lb 9.6 oz)       Body mass index is 17.79 kg/m .    Caden Escobar MA

## 2025-04-15 NOTE — PROGRESS NOTES
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 We will give Skyrizi 6 months and then plan to repeat colonoscopy to assess disease response and also assess inlet pouch stricture (due Sept 2025, order placed with advanced endo). While there is likely some inflammation contributing to stricture, I suspect this is mostly scar tissue. We will ask our advanced endoscopy team about placing stent for stricture management.     - Plan to continue Skyrizi  - Continue following with [facility] pharmacy re: HCM  - Colonoscopy in 6 months to assess disease response and likely stent placement for stricture management (with advanced endoscopy), order is placed, just needs to be scheduled.     2. Iron deficiency anemia   Likely due to ongoing inflammation from Crohn's disease. Minimal improvement of iron studies on oral iron. He has had infusion reaction with previous IV iron (passed out at onset of infusion, no angioedema or respiratory symptoms). Recently was able to complete 5 iron infusions.   -- Continue to monitor this and supp as needed    3. Vomiting and abdominal pain  Vomiting 3-4 times per week which has been chronic issue for patient and he feels is driven by anxiety. No upper GI findings on EGD or MRE to explain symptoms. Abdominal discomfort may be drive by Crohn's activity but less likely that vomiting is. Antiemetics have not been helpful in the past.   - Dicyclomine for cramping abdominal pain   - Treat active Crohn's disease as above  - If ongoing symptoms in future  may consider gastric emptying study to evaluate for gastroparesis and/or GI health psychologist given anxiety triggers    IBD Health Care Maintenance:  - Following with MTM      Misc:  -- Avoid tobacco use  -- Avoid NSAIDs as there is potentially a 25% chance of causing an IBD flare    Thank you for this consultation.  44 minutes spent on the date of the encounter doing chart review, history and exam, documentation and further activities as noted above.  It was a pleasure to participate in the care of this patient; please contact us with any further questions.      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  Current CD medications: none  Prior IBD surgeries:   -total colectomy 2001/2002  -IPAA formation  -multiple abdominal surgeries due to small bowel obstruction - s/p partial small bowel removal  Prior IBD Medications:  -prednisone - prior to colectomy  -mesalamine products - did not respond - prior to colectomy  -?azathiprine prior to colectomy - he is not sure - if he tried it, it did not control his UC  -infliximab (Remicade) infusions - did not respond (prior to colectomy)  -adalimumab (Humira) [id number] - treated for 1 year after pouchoscopy in 2017 that showed concern for Crohn's of the pouch and pre-pouch ileum. He reports this did not change his chronic vomiting    DRUG MONITORING  TPMT enzyme activity: pending    6-TGN/6-MMPN levels: NA    Biologic concentration: NA    DISEASE ASSESSMENT  Labs  Recent Labs   Lab Test 02/13/25  1137 11/10/24  0453   SED  --  16*   HGB 10.9* 10.7*     Fecal calprotectin: none  Endoscopy:   - Pouchoscopy 2/2025 - inflammation in distal pre-pouch ileum and pouch inlet. Stricture at pouch inlet which was dilated to  10mm  - EGD 2/2025 - erythematous mucosa in stomach. Biopsies with chronic inflammation   -pouchoscopy 4/2017 - ileal pouch with a few small aphthous ulcers. Ileorectal anastomosis narrowed, ulcerated with granulation tissue. Gastroscope could not be passed and could only be passed with pediatric gastroscope. Elías-TI (pre-pouch ileum?) evaluated 40 cm with several aphthous ulcer and edema. Findings suspicious for Crohn's Disease. Pathology: elías-TI focal acute ileitis. Rectal pouch - chronic active pouchitis. Ileo-rectal anastomosis. Granulation tissue with chronic active inflammation. Pouch biopsies with ileal mucosa and no inflammation  -EGD 4/20017 - normal esophagus, normal stomach and normal duodenum. Duodenum biopsies normal. Stomach biopsies unremarkable with no h pylori  Enterography:   - MRE 1/2025 - short segment mild active Crohn's disease at small bowel anastomosis and proximal to ileal J pouch.   -CTE at outside hospital 2017 - dilated rectum, folds prominent in proximal small bowel (nonspecific). No inflammatory changes  C diff: none    sIBDQ:   IBDQ Score Date IBDQ - Total Score  (Higher score better)   12/14/2024  10:27 AM 43           HPI:   Truman is here for follow up today.     He last saw Dr. David in December 2024 for initial consultation. Briefly, he was diagnosed with UC in 2000 and underwent colectomy with subsequent IPAA formation due to medically refractory disease in 2001/2002. Post-op course was complicated some small bowel obstructions in the years after his surgery and he did have at least one ex lap with small bowel resection (we do not have these records). He had pouchoscopy in 2017 which first questioned Crohn's disease of pouch and was treated with 1 year of adalimumab but stopped med and was lost to follow up after he moved.     His recent pouchoscopy showed active inflammation in pouch and pre pouch ileum consistent with Crohn's disease and he had severe stenosis at pouch inlet  which was dilated to 10mm.    Continues to have increased in gas and bloating. Continues to have 8 bowel movement per day (previously having 4). 3 bowel movement overnight.   No fever or chills.   No urgency.    Vomiting symptoms are the same, vomiting a few times per week. 3-4 after hours after eating, chunks of food he ate.  Trying to take dicyclomine 30 min to one hour prior to eating.    Extra intestinal manifestations of IBD:  No uveitis/episcleritis  No aphthous ulcers   No arthritis   No erythema nodosum/pyoderma gangrenosum.     PERTINENT PAST MEDICAL HISTORY:  No past medical history on file.    PREVIOUS SURGERIES:  Past Surgical History:   Procedure Laterality Date    ESOPHAGOSCOPY, GASTROSCOPY, DUODENOSCOPY (EGD), COMBINED N/A 2/13/2025    Procedure: Esophagoscopy, gastroscopy, duodenoscopy (EGD), biopsy;  Surgeon: Jam David MD;  Location: [facility] OR    POUCHOSCOPY N/A 2/13/2025    Procedure: Pouchoscopy with biopsy;  Surgeon: Jam David MD;  [place] [facility] [place]       PREVIOUS ENDOSCOPY:  No results found for this or any previous visit.]    ALLERGIES:     Allergies   Allergen Reactions    Ferumoxytol Shortness Of Breath and Other (See Comments)     Loss of consciousness     Morphine Shortness Of Breath     Itching     Morpholine Salicylate Hives       PERTINENT MEDICATIONS:    Current Outpatient Medications:     dicyclomine (BENTYL) 10 MG capsule, Take 1 capsule (10 mg) by mouth 4 times daily as needed (cramping abdominal pain)., Disp: 240 capsule, Rfl: 0    emtricitabine-tenofovir (TRUVADA) 200-300 MG per tablet, Take 1 tablet by mouth daily., Disp: 90 tablet, Rfl: 2    minoxidil (LONITEN) 2.5 MG tablet, Take 1 tablet (2.5 mg) by mouth daily., Disp: 30 tablet, Rfl: 11    ondansetron (ZOFRAN ODT) 4 MG ODT tab, Take 1 tablet (4 mg) by mouth every 8 hours as needed for nausea or vomiting., Disp: 12 tablet, Rfl: 0    Pediatric Multiple Vitamins (FLINTSTONES MULTIVITAMIN) CHEW, Take  1 chew tab by mouth daily., Disp: , Rfl:     risankizumab-rzaa (SKYRIZI) 600 MG/10ML injection, Inject 10 mLs (600 mg) into the vein every 4 weeks for 3 doses., Disp: , Rfl:     traZODone (DESYREL) 100 MG tablet, Take 2 tablets (200 mg) by mouth nightly as needed for sleep., Disp: 30 tablet, Rfl: 3    SOCIAL HISTORY:  Social History     Socioeconomic History    Marital status: Single     Spouse name: Not on file    Number of children: Not on file    Years of education: Not on file    Highest education level: Not on file   Occupational History    Not on file   Tobacco Use    Smoking status: Some Days     Types: Cigarettes    Smokeless tobacco: Never    Tobacco comments:     Smokes 5-10 cigarettes occasional once a month   Vaping Use    Vaping status: Never Used   Substance and Sexual Activity    Alcohol use: Not on file    Drug use: Not on file    Sexual activity: Not on file   Other Topics Concern    Not on file   Social History Narrative    Not on file     Social Drivers of Health     Financial Resource Strain: Low Risk  (11/8/2024)    Financial Resource Strain     Within the past 12 months, have you or your family members you live with been unable to get utilities (heat, electricity) when it was really needed?: No   Food Insecurity: Low Risk  (11/8/2024)    Food Insecurity     Within the past 12 months, did you worry that your food would run out before you got money to buy more?: No     Within the past 12 months, did the food you bought just not last and you didn t have money to get more?: No   Transportation Needs: Low Risk  (11/8/2024)    Transportation Needs     Within the past 12 months, has lack of transportation kept you from medical appointments, getting your medicines, non-medical meetings or appointments, work, or from getting things that you need?: No   Physical Activity: Unknown (11/8/2024)    Exercise Vital Sign     Days of Exercise per Week: 3 days     Minutes of Exercise per Session: Not on file    Stress: Stress Concern Present (11/8/2024)    [employer] [place] [employer] - Occupational Stress Questionnaire     Feeling of Stress : Very much   Social Connections: Unknown (11/8/2024)    Social Connection and Isolation Panel [NHANES]     Frequency of Communication with Friends and Family: Not on file     Frequency of Social Gatherings with Friends and Family: Three times a week     Attends [employer] Services: Not on file     Active Member of Clubs or Organizations: Not on file     Attends Club or Organization Meetings: Not on file     Marital Status: Not on file   Interpersonal Safety: Low Risk  (11/8/2024)    Interpersonal Safety     Do you feel physically and emotionally safe where you currently live?: Yes     Within the past 12 months, have you been hit, slapped, kicked or otherwise physically hurt by someone?: No     Within the past 12 months, have you been humiliated or emotionally abused in other ways by your partner or ex-partner?: No   Housing Stability: High Risk (11/8/2024)    Housing Stability     Do you have housing? : Yes     Are you worried about losing your housing?: Yes       FAMILY HISTORY:  No family history on file.    Past/family/social history reviewed and no changes    PHYSICAL EXAMINATION:  GENERAL: alert and no distress  EYES: Eyes grossly normal to inspection.  No discharge or erythema, or obvious scleral/conjunctival abnormalities.  RESP: No audible wheeze, cough, or visible cyanosis.    SKIN: Visible skin clear. No significant rash, abnormal pigmentation or lesions.  NEURO: Cranial nerves grossly intact.  Mentation and speech appropriate for age.  PSYCH: Appropriate affect, tone, and pace of words        PERTINENT STUDIES:  Most recent CBC:  Recent Labs   Lab Test 02/13/25  1137 11/10/24  0453   WBC 8.9 12.8*   HGB 10.9* 10.7*   HCT 34.9* 35.4*    514*     Most recent hepatic panel:  Recent Labs   Lab Test 03/28/25  1310 03/06/25  1614   ALT 12 13   AST 17 20      Most recent creatinine:  Recent Labs   Lab Test 03/06/25  1614 11/10/24  0453   CR 0.99 0.98

## 2025-04-15 NOTE — PATIENT INSTRUCTIONS
It was a pleasure meeting with you today and discussing your healthcare plan. Below is a summary of what we covered:    -- Continue skyrizi induction  -- Start entocort (budesonide) 9mg x 30 days then 6mg x 14 days then 3 mg x 14 days then stop.  -- Labs every 3 months   -- Next endoscopic assessment: to be scheduled Oct 2025  If you have not heard from endoscopy within a week, please call [phone] to schedule.     -- Patient with IBD we recommend supplementation vitamin D 1000 units daily and calcium 500 mg twice daily.  -- Vaccines/immunizations to be updated: shingrix  -- Yearly Dermatology visit for skin check while on immunosuppressive therapy. Can call [phone] to schedule.  -- No NSAIDs (ibuprofen, or anything containing ibuprofen)    For additional resources about inflammatory bowel disease visit http://www.crohnscolitisfoundation.org/    To learn more about Diet and Nutrition in the setting of IBD, check out some of these resources:  https://www.crohnscolitisfoundation.org/diet-and-nutrition/what-should-i-eat  https://www.nimbal.org/ (mSCD)  https://ntforibd.org/ (SCD, mSCD, Autoimmune protocol, IBD-AID, CDED diets with inclusion/exclusion charts)  https://ERNpt.org/ (mediterranean diet)  https://www.[facility].edu/nutrition/ibd/ibdaid/ (IBD-AID)      Please see below for any additional questions and scheduling guidelines.    Sign up for [employer]: [employer] patient portal serves as a secure platform for accessing your medical records from the [facility]. Additionally, [employer] facilitates easy, timely, and secure messaging with your care team. If you have not signed up, you may do so by using the provided code or calling [phone].    Coordinating your care after your visit:  There are multiple options for scheduling your follow-up care based on your provider's recommendation.    How do I schedule a follow-up clinic appointment:   After your appointment, you may receive scheduling  assistance with the Clinic Coordinators by having a seat in the waiting room and a Clinic Coordinator will call you up to schedule.  Virtual visits or after you leave the clinic:  Your provider has placed a follow-up order in the [employer] portal for scheduling your return appointment. A member of the scheduling team will contact you to schedule.  [employer] Scheduling: Timely scheduling through [employer] is advised to ensure appointment availability.   Call to schedule: You may schedule your follow-up appointment(s) by calling [phone], option 1.    How do I schedule my endoscopy or colonoscopy procedure:  If a procedure, such as a colonoscopy or upper endoscopy was ordered by your provider, the scheduling team will contact you to schedule this procedure. Or you may choose to call to schedule at   [phone], option 2.  Please allow 20-30 minutes when scheduling a procedure.    How do I get my blood work done? To get your blood work done, you need to schedule a lab appointment at an [facility]. There are multiple ways to schedule:   At the clinic: The Clinic Coordinator you meet after your visit can help you schedule a lab appointment.   [employer] scheduling: [employer] offers online lab scheduling at all [facility] laboratory locations.   Call to schedule: You can call [phone] to schedule your lab appointment.    How do I schedule my imaging study: To schedule imaging studies, such as CT scans, ultrasounds, MRIs, or X-rays, contact Imaging Services at [phone].    How do I schedule a referral to another doctor: If your provider recommended a referral to another specialist(s), the referral order was placed by your provider. You will receive a phone call to schedule this referral, or you may choose to call the number attached to the referral to self-schedule.    For Post-Visit Question(s):  For any inquiries following today's visit:  Please utilize [employer] messaging and allow 48 hours for  reply or contact the Call Center during normal business hours at [phone], option 3.  For Emergent After-hours questions, contact the On-Call GI Fellow through the [facility]  at [phone].  In addition, you may contact your Nurse directly using the provided contact information.    Test Results:  Test results will be accessible via [employer] in compliance with the 21st Century Cures Act. This means that your results will be available to you at the same time as your provider. Often you may see your results before your provider does. Results are reviewed by staff within two weeks with communication follow-up. Results may be released in the patient portal prior to your care team review.    Prescription Refill(s):  Medication prescribed by your provider will be addressed during your visit. For future refills, please coordinate with your pharmacy. If you have not had a recent clinic visit or routine labs, for your safety, your provider may not be able to refill your prescription.

## 2025-04-15 NOTE — LETTER
4/15/2025      Joseph R Barthel  [address]  [facility] [address]      Dear Colleague,    Thank you for referring your patient, Joseph R Barthel, to the [facility] [place]. Please see a copy of my visit note below.    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 We will give Skyrizi 6 months and then plan to repeat colonoscopy to assess disease response and also assess inlet pouch stricture (due Sept 2025, order placed with advanced endo). While there is likely some inflammation contributing to stricture, I suspect this is mostly scar tissue. We will ask our advanced endoscopy team about placing stent for stricture management.     - Plan to continue Skyrizi  - Continue following with [facility] pharmacy re: HCM  - Colonoscopy in 6 months to assess disease response and likely stent placement for stricture management (with advanced endoscopy), order is placed, just needs to be scheduled.     2. Iron deficiency anemia   Likely due to ongoing inflammation from Crohn's disease. Minimal improvement of iron studies on oral iron. He has had infusion reaction with previous IV iron (passed out at onset of infusion, no angioedema or respiratory symptoms). Recently was able to complete 5 iron infusions.   -- Continue to monitor this and supp as needed    3. Vomiting and abdominal pain  Vomiting 3-4 times per week which has been chronic issue for patient and he feels is driven by anxiety. No upper GI findings on EGD or MRE to  explain symptoms. Abdominal discomfort may be drive by Crohn's activity but less likely that vomiting is. Antiemetics have not been helpful in the past.   - Dicyclomine for cramping abdominal pain   - Treat active Crohn's disease as above  - If ongoing symptoms in future may consider gastric emptying study to evaluate for gastroparesis and/or GI health psychologist given anxiety triggers    IBD Health Care Maintenance:  - Following with MTM      Misc:  -- Avoid tobacco use  -- Avoid NSAIDs as there is potentially a 25% chance of causing an IBD flare    Thank you for this consultation.  44 minutes spent on the date of the encounter doing chart review, history and exam, documentation and further activities as noted above.  It was a pleasure to participate in the care of this patient; please contact us with any further questions.      Andrey Weinstein PA-C  Division of Gastroenterology, Hepatology and Nutrition  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  Current CD medications: none  Prior IBD surgeries:   -total colectomy 2001/2002  -IPAA formation  -multiple abdominal surgeries due to small bowel obstruction - s/p partial small bowel removal  Prior IBD Medications:  -prednisone - prior to colectomy  -mesalamine products - did not respond - prior to colectomy  -?azathiprine prior to colectomy - he is not sure - if he tried it, it did not control his UC  -infliximab (Remicade) infusions - did not respond (prior to colectomy)  -adalimumab (Humira) 3007-6900 - treated for 1 year after pouchoscopy in 2017 that showed concern for Crohn's of the pouch and pre-pouch ileum. He reports this did not change his chronic vomiting    DRUG MONITORING  TPMT enzyme activity: pending    6-TGN/6-MMPN levels: NA    Biologic concentration: NA    DISEASE  ASSESSMENT  Labs  Recent Labs   Lab Test 02/13/25  1137 11/10/24  0453   SED  --  16*   HGB 10.9* 10.7*     Fecal calprotectin: none  Endoscopy:   - Pouchoscopy 2/2025 - inflammation in distal pre-pouch ileum and pouch inlet. Stricture at pouch inlet which was dilated to 10mm  - EGD 2/2025 - erythematous mucosa in stomach. Biopsies with chronic inflammation   -pouchoscopy 4/2017 - ileal pouch with a few small aphthous ulcers. Ileorectal anastomosis narrowed, ulcerated with granulation tissue. Gastroscope could not be passed and could only be passed with pediatric gastroscope. Elías-TI (pre-pouch ileum?) evaluated 40 cm with several aphthous ulcer and edema. Findings suspicious for Crohn's Disease. Pathology: elías-TI focal acute ileitis. Rectal pouch - chronic active pouchitis. Ileo-rectal anastomosis. Granulation tissue with chronic active inflammation. Pouch biopsies with ileal mucosa and no inflammation  -EGD 4/20017 - normal esophagus, normal stomach and normal duodenum. Duodenum biopsies normal. Stomach biopsies unremarkable with no h pylori  Enterography:   - MRE 1/2025 - short segment mild active Crohn's disease at small bowel anastomosis and proximal to ileal J pouch.   -CTE at outside hospital 2017 - dilated rectum, folds prominent in proximal small bowel (nonspecific). No inflammatory changes  C diff: none    sIBDQ:   IBDQ Score Date IBDQ - Total Score  (Higher score better)   12/14/2024  10:27 AM 43           HPI:   Truman is here for follow up today.     He last saw Dr. David in December 2024 for initial consultation. Briefly, he was diagnosed with UC in 2000 and underwent colectomy with subsequent IPAA formation due to medically refractory disease in 2001/2002. Post-op course was complicated some small bowel obstructions in the years after his surgery and he did have at least one ex lap with small bowel resection (we do not have these records). He had pouchoscopy in 2017 which first questioned Crohn's  disease of pouch and was treated with 1 year of adalimumab but stopped med and was lost to follow up after he moved.     His recent pouchoscopy showed active inflammation in pouch and pre pouch ileum consistent with Crohn's disease and he had severe stenosis at pouch inlet which was dilated to 10mm.    Continues to have increased in gas and bloating. Continues to have 8 bowel movement per day (previously having 4). 3 bowel movement overnight.   No fever or chills.   No urgency.    Vomiting symptoms are the same, vomiting a few times per week. 3-4 after hours after eating, chunks of food he ate.  Trying to take dicyclomine 30 min to one hour prior to eating.    Extra intestinal manifestations of IBD:  No uveitis/episcleritis  No aphthous ulcers   No arthritis   No erythema nodosum/pyoderma gangrenosum.     PERTINENT PAST MEDICAL HISTORY:  No past medical history on file.    PREVIOUS SURGERIES:  Past Surgical History:   Procedure Laterality Date     ESOPHAGOSCOPY, GASTROSCOPY, DUODENOSCOPY (EGD), COMBINED N/A 2/13/2025    Procedure: Esophagoscopy, gastroscopy, duodenoscopy (EGD), biopsy;  Surgeon: Jam David MD;  Location: [facility] OR     POUCHOSCOPY N/A 2/13/2025    Procedure: Pouchoscopy with biopsy;  Surgeon: Jam David MD;  Location: [facility] OR       PREVIOUS ENDOSCOPY:  No results found for this or any previous visit.]    ALLERGIES:     Allergies   Allergen Reactions     Ferumoxytol Shortness Of Breath and Other (See Comments)     Loss of consciousness      Morphine Shortness Of Breath     Itching      Morpholine Salicylate Hives       PERTINENT MEDICATIONS:    Current Outpatient Medications:      dicyclomine (BENTYL) 10 MG capsule, Take 1 capsule (10 mg) by mouth 4 times daily as needed (cramping abdominal pain)., Disp: 240 capsule, Rfl: 0     emtricitabine-tenofovir (TRUVADA) 200-300 MG per tablet, Take 1 tablet by mouth daily., Disp: 90 tablet, Rfl: 2     minoxidil (LONITEN) 2.5 MG  tablet, Take 1 tablet (2.5 mg) by mouth daily., Disp: 30 tablet, Rfl: 11     ondansetron (ZOFRAN ODT) 4 MG ODT tab, Take 1 tablet (4 mg) by mouth every 8 hours as needed for nausea or vomiting., Disp: 12 tablet, Rfl: 0     Pediatric Multiple Vitamins (FLINTSTONES MULTIVITAMIN) CHEW, Take 1 chew tab by mouth daily., Disp: , Rfl:      risankizumab-rzaa (SKYRIZI) 600 MG/10ML injection, Inject 10 mLs (600 mg) into the vein every 4 weeks for 3 doses., Disp: , Rfl:      traZODone (DESYREL) 100 MG tablet, Take 2 tablets (200 mg) by mouth nightly as needed for sleep., Disp: 30 tablet, Rfl: 3    SOCIAL HISTORY:  Social History     Socioeconomic History     Marital status: Single     Spouse name: Not on file     Number of children: Not on file     Years of education: Not on file     Highest education level: Not on file   Occupational History     Not on file   Tobacco Use     Smoking status: Some Days     Types: Cigarettes     Smokeless tobacco: Never     Tobacco comments:     Smokes 5-10 cigarettes occasional once a month   Vaping Use     Vaping status: Never Used   Substance and Sexual Activity     Alcohol use: Not on file     Drug use: Not on file     Sexual activity: Not on file   Other Topics Concern     Not on file   Social History Narrative     Not on file     Social Drivers of Health     Financial Resource Strain: Low Risk  (11/8/2024)    Financial Resource Strain      Within the past 12 months, have you or your family members you live with been unable to get utilities (heat, electricity) when it was really needed?: No   Food Insecurity: Low Risk  (11/8/2024)    Food Insecurity      Within the past 12 months, did you worry that your food would run out before you got money to buy more?: No      Within the past 12 months, did the food you bought just not last and you didn t have money to get more?: No   Transportation Needs: Low Risk  (11/8/2024)    Transportation Needs      Within the past 12 months, has lack of  transportation kept you from medical appointments, getting your medicines, non-medical meetings or appointments, work, or from getting things that you need?: No   Physical Activity: Unknown (11/8/2024)    Exercise Vital Sign      Days of Exercise per Week: 3 days      Minutes of Exercise per Session: Not on file   Stress: Stress Concern Present (11/8/2024)    [employer] - Occupational Stress Questionnaire      Feeling of Stress : Very much   Social Connections: Unknown (11/8/2024)    Social Connection and Isolation Panel [NHANES]      Frequency of Communication with Friends and Family: Not on file      Frequency of Social Gatherings with Friends and Family: Three times a week      Attends Sabianism Services: Not on file      Active Member of Clubs or Organizations: Not on file      Attends Club or Organization Meetings: Not on file      Marital Status: Not on file   Interpersonal Safety: Low Risk  (11/8/2024)    Interpersonal Safety      Do you feel physically and emotionally safe where you currently live?: Yes      Within the past 12 months, have you been hit, slapped, kicked or otherwise physically hurt by someone?: No      Within the past 12 months, have you been humiliated or emotionally abused in other ways by your partner or ex-partner?: No   Housing Stability: High Risk (11/8/2024)    Housing Stability      Do you have housing? : Yes      Are you worried about losing your housing?: Yes       FAMILY HISTORY:  No family history on file.    Past/family/social history reviewed and no changes    PHYSICAL EXAMINATION:  GENERAL: alert and no distress  EYES: Eyes grossly normal to inspection.  No discharge or erythema, or obvious scleral/conjunctival abnormalities.  RESP: No audible wheeze, cough, or visible cyanosis.    SKIN: Visible skin clear. No significant rash, abnormal pigmentation or lesions.  NEURO: Cranial nerves grossly intact.  Mentation and speech appropriate for  age.  PSYCH: Appropriate affect, tone, and pace of words        PERTINENT STUDIES:  Most recent CBC:  Recent Labs   Lab Test 02/13/25  1137 11/10/24  0453   WBC 8.9 12.8*   HGB 10.9* 10.7*   HCT 34.9* 35.4*    514*     Most recent hepatic panel:  Recent Labs   Lab Test 03/28/25  1310 03/06/25  1614   ALT 12 13   AST 17 20     Most recent creatinine:  Recent Labs   Lab Test 03/06/25  1614 11/10/24  0453   CR 0.99 0.98             Again, thank you for allowing me to participate in the care of your patient.        Sincerely,        Andrey Weinstein PA-C    Electronically signed

## 2025-04-20 DIAGNOSIS — R10.84 GENERALIZED ABDOMINAL PAIN: ICD-10-CM

## 2025-04-24 RX ORDER — DICYCLOMINE HYDROCHLORIDE 10 MG/1
10 CAPSULE ORAL 4 TIMES DAILY PRN
Qty: 240 CAPSULE | Refills: 0 | Status: SHIPPED | OUTPATIENT
Start: 2025-04-24 | End: 2025-06-23

## 2025-04-24 NOTE — TELEPHONE ENCOUNTER
Last Written Prescription:     dicyclomine (BENTYL) 10 MG capsule 240 capsule 0 2/26/2025 4/27/2025 No   Sig - Route: Take 1 capsule (10 mg) by mouth 4 times daily as needed (cramping abdominal pain). - Oral     ----------------------  Last Visit Date: 4/15/25 [facility]  Future Visit Date: 7/15/25 [facility]  ----------------------      Refill decision: Medication refilled per  Medication Refill in Ambulatory Care  policy.     Request from pharmacy:  Requested Prescriptions   Pending Prescriptions Disp Refills    dicyclomine (BENTYL) 10 MG capsule [Pharmacy Med Name: Dicyclomine HCl Oral Capsule 10 MG] 240 capsule 0     Sig: Take 1 capsule (10 mg) by mouth 4 times daily as needed (cramping abdominal pain).       Oral Anticholinergic Agents Passed - 4/24/2025  8:07 AM        Passed - Patient is of age 12 or older        Passed - Medication is active on med list and the sig matches. RN to manually verify dose and sig if red X/fail.     If the protocol passes (green check), you do not need to verify med dose and sig.    A prescription matches if they are the same clinical intention.    For Example: once daily and every morning are the same.    The protocol can not identify upper and lower case letters as matching and will fail.     For Example: Take 1 tablet (50 mg) by mouth daily     TAKE 1 TABLET (50 MG) BY MOUTH DAILY    For all fails (red x), verify dose and sig.    If the refill does match what is on file, the RN can still proceed to approve the refill request.       If they do not match, route to the appropriate provider.             Passed - Recent (12 mo) or future (90 days) visit with authorizing provider's specialty     The patient must have completed an in-person or virtual visit within the past 12 months or has a future visit scheduled within the next 90 days with the authorizing provider s specialty.  Urgent care and e-visits do not qualify as an office visit for this protocol.

## 2025-04-29 ENCOUNTER — INFUSION THERAPY VISIT (OUTPATIENT)
Dept: INFUSION THERAPY | Facility: CLINIC | Age: 40
End: 2025-04-29
Attending: INTERNAL MEDICINE
Payer: COMMERCIAL

## 2025-04-29 VITALS
OXYGEN SATURATION: 98 % | SYSTOLIC BLOOD PRESSURE: 115 MMHG | HEART RATE: 85 BPM | DIASTOLIC BLOOD PRESSURE: 78 MMHG | RESPIRATION RATE: 16 BRPM | TEMPERATURE: 97.1 F

## 2025-04-29 DIAGNOSIS — R11.10 CHRONIC VOMITING: ICD-10-CM

## 2025-04-29 DIAGNOSIS — K50.018 CROHN'S DISEASE OF SMALL INTESTINE WITH OTHER COMPLICATION (H): Primary | ICD-10-CM

## 2025-04-29 DIAGNOSIS — R11.0 CHRONIC NAUSEA: ICD-10-CM

## 2025-04-29 LAB
ALBUMIN SERPL BCG-MCNC: 3.2 G/DL (ref 3.5–5.2)
ALP SERPL-CCNC: 69 U/L (ref 40–150)
ALT SERPL W P-5'-P-CCNC: 22 U/L (ref 0–70)
AST SERPL W P-5'-P-CCNC: 20 U/L (ref 0–45)
BILIRUB DIRECT SERPL-MCNC: 0.1 MG/DL (ref 0–0.3)
BILIRUB SERPL-MCNC: 0.3 MG/DL
PROT SERPL-MCNC: 6 G/DL (ref 6.4–8.3)

## 2025-04-29 PROCEDURE — 96365 THER/PROPH/DIAG IV INF INIT: CPT

## 2025-04-29 PROCEDURE — 250N000011 HC RX IP 250 OP 636: Mod: JZ | Performed by: INTERNAL MEDICINE

## 2025-04-29 PROCEDURE — 258N000003 HC RX IP 258 OP 636: Performed by: INTERNAL MEDICINE

## 2025-04-29 PROCEDURE — 84155 ASSAY OF PROTEIN SERUM: CPT | Performed by: INTERNAL MEDICINE

## 2025-04-29 PROCEDURE — 36415 COLL VENOUS BLD VENIPUNCTURE: CPT | Performed by: INTERNAL MEDICINE

## 2025-04-29 RX ORDER — METOCLOPRAMIDE 10 MG/1
10 TABLET ORAL
COMMUNITY

## 2025-04-29 RX ORDER — ALBUTEROL SULFATE 90 UG/1
1-2 INHALANT RESPIRATORY (INHALATION)
Start: 2025-05-23

## 2025-04-29 RX ORDER — ALBUTEROL SULFATE 0.83 MG/ML
2.5 SOLUTION RESPIRATORY (INHALATION)
OUTPATIENT
Start: 2025-05-23

## 2025-04-29 RX ORDER — HEPARIN SODIUM,PORCINE 10 UNIT/ML
5-20 VIAL (ML) INTRAVENOUS DAILY PRN
OUTPATIENT
Start: 2025-05-23

## 2025-04-29 RX ORDER — HEPARIN SODIUM (PORCINE) LOCK FLUSH IV SOLN 100 UNIT/ML 100 UNIT/ML
5 SOLUTION INTRAVENOUS
OUTPATIENT
Start: 2025-05-23

## 2025-04-29 RX ORDER — PENICILLIN V POTASSIUM 500 MG/1
500 TABLET, FILM COATED ORAL 2 TIMES DAILY
COMMUNITY

## 2025-04-29 RX ORDER — DIPHENHYDRAMINE HYDROCHLORIDE 50 MG/ML
25 INJECTION, SOLUTION INTRAMUSCULAR; INTRAVENOUS
Start: 2025-05-23

## 2025-04-29 RX ORDER — DIPHENHYDRAMINE HYDROCHLORIDE 50 MG/ML
50 INJECTION, SOLUTION INTRAMUSCULAR; INTRAVENOUS
Start: 2025-05-23

## 2025-04-29 RX ORDER — MEPERIDINE HYDROCHLORIDE 25 MG/ML
25 INJECTION INTRAMUSCULAR; INTRAVENOUS; SUBCUTANEOUS
OUTPATIENT
Start: 2025-05-23

## 2025-04-29 RX ORDER — EPINEPHRINE 1 MG/ML
0.3 INJECTION, SOLUTION INTRAMUSCULAR; SUBCUTANEOUS EVERY 5 MIN PRN
OUTPATIENT
Start: 2025-05-23

## 2025-04-29 RX ORDER — METHYLPREDNISOLONE SODIUM SUCCINATE 40 MG/ML
40 INJECTION INTRAMUSCULAR; INTRAVENOUS
Start: 2025-05-23

## 2025-04-29 RX ADMIN — DEXTROSE MONOHYDRATE 600 MG: 50 INJECTION, SOLUTION INTRAVENOUS at 09:03

## 2025-04-29 RX ADMIN — DEXTROSE 250 ML: 5 SOLUTION INTRAVENOUS at 09:00

## 2025-04-29 NOTE — PATIENT INSTRUCTIONS
Call the triage nurse at your clinic or seek medical attention if you have chills and/or temperature greater than or equal to 100.5, uncontrolled nausea/vomiting, diarrhea, constipation, dizziness, shortness of breath, chest pain, heart palpitations, weakness or any other new or concerning symptoms, questions or concerns.  You cannot have any live virus vaccines prior to or during treatment or up to 6 months post infusion.  If you have an upcoming surgery, medical procedure or dental procedure during treatment, this should be discussed with your ordering physician and your surgeon/dentist.  If you are having any concerning symptom, if you are unsure if you should get your next infusion or wish to speak to a provider before your next infusion, please call your care coordinator or triage nurse at your clinic to notify them so we can adequately serve you.

## 2025-04-29 NOTE — PROGRESS NOTES
Infusion Nursing Note:  Joseph R Barthel presents today for Skyrizi + labs.    Patient seen by provider today: No   present during visit today: Not Applicable.    Note: Patient was having some tooth/jaw pain about a week ago and went to dentist to get it checked out.  Per dentist, the patient was grinding his teeth.  Pain did not go away and patient went to [facility] and they put him on Penicillin for 11 days.  He is on day 6.  Per Dr David, okay to proceed with Skyrizi today as long as he is feeling better, no fevers, and is following up with primary care.    Intravenous Access:  Labs drawn without difficulty.  Peripheral IV placed.    Treatment Conditions:  Biological Infusion Checklist:  ~~~ NOTE: If the patient answers yes to any of the questions below, hold the infusion and contact ordering provider or on-call provider.    Have you recently had an elevated temperature, fever, chills, productive cough, coughing for 3 weeks or longer or hemoptysis,  abnormal vital signs, night sweats,  chest pain or have you noticed a decrease in your appetite, unexplained weight loss or fatigue? No  Do you have any open wounds or new incisions? No  Do you have any upcoming hospitalizations or surgeries? Does not include esophagogastroduodenoscopy, colonoscopy, endoscopic retrograde cholangiopancreatography (ERCP), endoscopic ultrasound (EUS), dental procedures or joint aspiration/steroid injections No  Do you currently have any signs of illness or infection or are you on any antibiotics? Yes, Penicillin  Have you had any new, sudden or worsening abdominal pain? No  Have you or anyone in your household received a live vaccination in the past 4 weeks? Please note: No live vaccines while on biologic/chemotherapy until 6 months after the last treatment. Patient can receive the flu vaccine (shot only), pneumovax and the Covid vaccine. It is optimal for the patient to get these vaccines mid cycle, but they can be  given at any time as long as it is not on the day of the infusion. No  Have you recently been diagnosed with any new nervous system diseases (ie. Multiple sclerosis, Guillain [place], seizures, neurological changes) or cancer diagnosis? Are you on any form of radiation or chemotherapy? No  Are you pregnant or breast feeding or do you have plans of pregnancy in the future? No  Have you been having any signs of worsening depression or suicidal ideations?  (benlysta only) No  Have there been any other new onset medical symptoms? No  Have you had any new blood clots? (IVIG only) No      Post Infusion Assessment:  Patient tolerated infusion without incident.  Blood return noted pre and post infusion.  Site patent and intact, free from redness, edema or discomfort.  No evidence of extravasations.  Access discontinued per protocol.       Discharge Plan:   Discharge instructions reviewed with: Patient.  Patient and/or family verbalized understanding of discharge instructions and all questions answered.  AVS to patient via theRightAPIT.  Patient will return 5/27/25 for next appointment.   Patient discharged in stable condition accompanied by: self.  Departure Mode: Ambulatory.      Fadumo Suarez RN

## 2025-05-05 ENCOUNTER — OFFICE VISIT (OUTPATIENT)
Dept: FAMILY MEDICINE | Facility: CLINIC | Age: 40
End: 2025-05-05
Payer: COMMERCIAL

## 2025-05-05 VITALS
HEIGHT: 67 IN | HEART RATE: 95 BPM | RESPIRATION RATE: 12 BRPM | TEMPERATURE: 98 F | DIASTOLIC BLOOD PRESSURE: 70 MMHG | OXYGEN SATURATION: 97 % | WEIGHT: 105.2 LBS | SYSTOLIC BLOOD PRESSURE: 90 MMHG | BODY MASS INDEX: 16.51 KG/M2

## 2025-05-05 DIAGNOSIS — M27.2 ABSCESS OF JAW: ICD-10-CM

## 2025-05-05 DIAGNOSIS — R68.84 JAW PAIN: Primary | ICD-10-CM

## 2025-05-05 PROCEDURE — 99214 OFFICE O/P EST MOD 30 MIN: CPT | Performed by: FAMILY MEDICINE

## 2025-05-05 PROCEDURE — 3074F SYST BP LT 130 MM HG: CPT | Performed by: FAMILY MEDICINE

## 2025-05-05 PROCEDURE — 3078F DIAST BP <80 MM HG: CPT | Performed by: FAMILY MEDICINE

## 2025-05-05 PROCEDURE — 1125F AMNT PAIN NOTED PAIN PRSNT: CPT | Performed by: FAMILY MEDICINE

## 2025-05-05 RX ORDER — CLINDAMYCIN HYDROCHLORIDE 300 MG/1
300 CAPSULE ORAL 3 TIMES DAILY
Qty: 28 CAPSULE | Refills: 0 | Status: SHIPPED | OUTPATIENT
Start: 2025-05-05

## 2025-05-05 RX ORDER — HYDROCODONE BITARTRATE AND ACETAMINOPHEN 5; 325 MG/1; MG/1
1 TABLET ORAL EVERY 6 HOURS PRN
Qty: 18 TABLET | Refills: 0 | Status: SHIPPED | OUTPATIENT
Start: 2025-05-05 | End: 2025-05-08

## 2025-05-05 ASSESSMENT — PAIN SCALES - GENERAL: PAINLEVEL_OUTOF10: MILD PAIN (2)

## 2025-05-05 NOTE — PROGRESS NOTES
Assessment & Plan   Patient was agreeable with my suggestion of doing CT scan study for further assessment of area of tenderness and discomfort.  Further management can be based on CT scan findings.  Patient started on different antibiotic treatment with broader spectrum coverage.  See result notes for further management details.    A total of 30 minutes was spent discussing with patient past medical history and management, on current concerns, on physical exam, on ongoing assessment and management, and on documentation.    Jaw pain    - HYDROcodone-acetaminophen (NORCO) 5-325 MG tablet  Dispense: 18 tablet; Refill: 0  - CTA Head Neck with Contrast  - CT Soft Tissue Neck w Contrast    Abscess of jaw    - HYDROcodone-acetaminophen (NORCO) 5-325 MG tablet  Dispense: 18 tablet; Refill: 0  - clindamycin (CLEOCIN) 300 MG capsule  Dispense: 28 capsule; Refill: 0  - CTA Head Neck with Contrast  - CT Soft Tissue Neck w Contrast              Marcelino Laughlin is a 39 year old, presenting for the following health issues:  Mass (Pt notice a mass on his right side jaw, pt had seen dentist and urgent care but it has not been gone yet. Pt was told it was an infection, and he was prescribed penicillin and pt bought OTC tylenol and it has helped the pain to go away but swollen is still present. )        5/5/2025     8:16 AM   Additional Questions   Roomed by Saira Maldonado MA Learner   Accompanied by Self     Mass    History of Present Illness       Reason for visit:  Jaw  Symptom onset:  3-4 weeks ago  Symptoms include:  Pain, swelling  Symptom intensity:  Severe  Symptom progression:  Staying the same  Had these symptoms before:  No  What makes it worse:  Eating  What makes it better:  Pain medication   He is taking medications regularly.                Review of Systems  Patient visit done for primary reason of tender mass over right side of mandible.    Patient finished last PCN capsule antibiotic treatment this  "morning, prescribed through urgency room provider.     Yesterday morning, the swelling was more severe on right side of face with facial pain with speeking. He had swelling in lower gums, with swelling seeming to be better. He had an unusual taste in mouth yesterday.     He has seen a dentist about facial concern, with no concerns of tooth abscess noted per patient. Dentist felt he has a teeth grinding concern. He has purchased his own bite block at [employer], which he is working on fitting at home. The bite block through the dentist was over $600.00.    Symptoms started 16 days ago, waking at 2 AM with right jaw pain.     He feels his recent vomiting is related to his crohn's disease, vomiting last night. It happens after eating since last October.     No reported fevers.        Objective    BP 90/70 (BP Location: Right arm, Patient Position: Sitting, Cuff Size: Adult Regular)   Pulse 95   Temp 98  F (36.7  C) (Oral)   Resp 12   Ht 1.702 m (5' 7\")   Wt 47.7 kg (105 lb 3.2 oz)   SpO2 97%   BMI 16.48 kg/m    Body mass index is 16.48 kg/m .  Physical Exam   Vital signs reviewed.  Patient is in mild acute appearing distress due to right mandibular area discomfort.  Breathing appears nonlabored.  Patient is alert and oriented ×3.  Patient is polite, making good eye contact and responding with clear fluent speech.    ENT: Ear exam shows bilateral tympanic membranes to be clear without injection, nasal turbinates show no injection or edema, no pharyngeal injection or exudate.  Patient has a yellowish blanched area over her right lower gum with mild gingival edema noted in this area.  Adjacent to the scar area it is a very tender area of right mandible with visible and palpable swelling in this area.    Neck: supple with no adenoapthy, palpable abnormal masses, or thyroid abnormality.    Heart: Heart rate is regular without murmur.    Lungs: Lungs are clear to auscultation with good airflow bilaterally.    See " pictures below in this visit note for further description of exam findings.                      Signed Electronically by: Shawn Rivera DO

## 2025-05-05 NOTE — PROGRESS NOTES
{PROVIDER CHARTING PREFERENCE:372755}    Marcelino Laughlin is a 39 year old, presenting for the following health issues:  Mass (Pt notice a mass on his right side jaw, pt had seen dentist and urgent care but it has not been gone yet. Pt was told it was an infection, and he was prescribed penicillin and pt bought OTC tylenol and it has helped the pain to go away but swollen is still present. )  {(!) Visit Details have not yet been documented.  Please enter Visit Details and then use this list to pull in documentation. (Optional):189934}  HPI      {MA/LPN/RN Pre-Provider Visit Orders- hCG/UA/Strep (Optional):468210}  {SUPERLIST (Optional):283962}  {additonal problems for provider to add (Optional):212539}    {ROS Picklists (Optional):937997}      Objective    There were no vitals taken for this visit.  There is no height or weight on file to calculate BMI.  Physical Exam   {Exam List (Optional):432427}    {Diagnostic Test Results (Optional):760043}        Signed Electronically by: Shawn Rivera DO  {Email feedback regarding this note to [email or web address]   :[id number]}

## 2025-05-06 ENCOUNTER — HOSPITAL ENCOUNTER (OUTPATIENT)
Dept: CT IMAGING | Facility: CLINIC | Age: 40
Discharge: HOME | End: 2025-05-06
Attending: FAMILY MEDICINE
Payer: COMMERCIAL

## 2025-05-06 DIAGNOSIS — R68.84 JAW PAIN: ICD-10-CM

## 2025-05-06 DIAGNOSIS — M27.2 ABSCESS OF JAW: ICD-10-CM

## 2025-05-06 PROCEDURE — 250N000011 HC RX IP 250 OP 636: Performed by: FAMILY MEDICINE

## 2025-05-06 PROCEDURE — 70450 CT HEAD/BRAIN W/O DYE: CPT

## 2025-05-06 PROCEDURE — 250N000009 HC RX 250: Performed by: FAMILY MEDICINE

## 2025-05-06 PROCEDURE — 70491 CT SOFT TISSUE NECK W/DYE: CPT

## 2025-05-06 RX ORDER — IOPAMIDOL 755 MG/ML
500 INJECTION, SOLUTION INTRAVASCULAR ONCE
Status: COMPLETED | OUTPATIENT
Start: 2025-05-06 | End: 2025-05-06

## 2025-05-06 RX ADMIN — SODIUM CHLORIDE 65 ML: 9 INJECTION, SOLUTION INTRAVENOUS at 13:25

## 2025-05-06 RX ADMIN — IOPAMIDOL 90 ML: 755 INJECTION, SOLUTION INTRAVENOUS at 13:25

## 2025-05-15 ASSESSMENT — ANXIETY QUESTIONNAIRES
6. BECOMING EASILY ANNOYED OR IRRITABLE: NOT AT ALL
GAD7 TOTAL SCORE: 13
5. BEING SO RESTLESS THAT IT IS HARD TO SIT STILL: NEARLY EVERY DAY
3. WORRYING TOO MUCH ABOUT DIFFERENT THINGS: SEVERAL DAYS
2. NOT BEING ABLE TO STOP OR CONTROL WORRYING: MORE THAN HALF THE DAYS
7. FEELING AFRAID AS IF SOMETHING AWFUL MIGHT HAPPEN: SEVERAL DAYS
IF YOU CHECKED OFF ANY PROBLEMS ON THIS QUESTIONNAIRE, HOW DIFFICULT HAVE THESE PROBLEMS MADE IT FOR YOU TO DO YOUR WORK, TAKE CARE OF THINGS AT HOME, OR GET ALONG WITH OTHER PEOPLE: EXTREMELY DIFFICULT
4. TROUBLE RELAXING: NEARLY EVERY DAY
1. FEELING NERVOUS, ANXIOUS, OR ON EDGE: NEARLY EVERY DAY
8. IF YOU CHECKED OFF ANY PROBLEMS, HOW DIFFICULT HAVE THESE MADE IT FOR YOU TO DO YOUR WORK, TAKE CARE OF THINGS AT HOME, OR GET ALONG WITH OTHER PEOPLE?: EXTREMELY DIFFICULT
GAD7 TOTAL SCORE: 13
7. FEELING AFRAID AS IF SOMETHING AWFUL MIGHT HAPPEN: SEVERAL DAYS
GAD7 TOTAL SCORE: 13

## 2025-05-15 ASSESSMENT — PAIN SCALES - PAIN ENJOYMENT GENERAL ACTIVITY SCALE (PEG)
PEG_TOTALSCORE: 9
AVG_PAIN_PASTWEEK: 8
INTERFERED_ENJOYMENT_LIFE: 9
INTERFERED_GENERAL_ACTIVITY: 10
PEG_TOTALSCORE: 9
INTERFERED_ENJOYMENT_LIFE: 9
AVG_PAIN_PASTWEEK: 8
INTERFERED_GENERAL_ACTIVITY: 10 - COMPLETELY INTERFERES

## 2025-05-19 NOTE — PROGRESS NOTES
"                      [facility] Consultation    Date of visit: 5/20/2025    Reason for consultation:    Joseph R Barthel is a 39 year old male who is seen in consultation today at the request of Gastroenterology, Jam David MD.     Consultation and Evaluation for:       Review of [place] Prescription Monitoring Program (): Today I have also reviewed the patient's history of controlled substance use, as provided by [place] licensed pharmacies and prescriber dispensers.       Review of Pain Questionnaire: Please see the [facility] health questionnaire, which the patient completed and reviewed with me in detail.    Review of Electronic Chart: Today I have also reviewed available medical information in the patient's medical record at [facility] [facility] including relevant provider notes, laboratory work, and imaging.       Chief Complaint:    Chief Complaint   Patient presents with    Pain       Pain history:  Joseph R Barthel is a 39 year old male who presents for initial evaluation of the following chronic pain complaints.     - He has chronic abdominal pain and associated nausea and vomiting.  - His chron's disease flares up and \"causes problems\".   - He was previously going to a pain clinic in [place] where he was getting chronic opioid prescriptions and coming in monthly for urine drug screens and contract renewals.  He requested a referral to the pain clinic which was placed by his gastroenterologist with hopes that medication management could be resumed.  - He is very unhappy with the current plan of care which is to go to the ER when he has a pain flare.  He states he cannot afford to do this and does not have time to do this.  - He has a primary care provider within the [facility] system as well.  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    - He has been living in [place] for a couple years now.  He was previously living in [place] and a few other places but " "has returned here because his family is here.  - Chron's is managed by Gastroenterology, Dr. David, He has a follow up in July for another endoscopy.   - He recently started Skyrizi and starts at home injections in 8 weeks.  This is going well so far.  The first 2 injections were intravenous and going he will do home injections.  - He is vomiting every day and has some Reglan and Bentyl which \"doesn't work\".   - He also has some anxiety and wonders if this contributes. He was previously on medications for anxiety and they were not helpful.  He does not currently have a psychiatrist and is not on any medications for mental health.  - He is interested in medical marijuana.  He previously tried Edibles and smoked it and it helped to calm him down and relax.  He is currently using THC drinks and finds these helpful.  - He has lost 20# and cannot keep much food or drink down.       PAIN QUESTIONAIRE:   Patient reported symptoms:  Patient Supplied Answers To the  Pain Questionnaire      5/15/2025    10:37 AM   [facility] -  Patient Entered Questionnaire/Answers   What number best describes your pain right now:  0 = No pain  to  10 = Worst pain imaginable 1   How would you describe the pain cramping    sharp    cutting    dull, aching    throbbing    pressure    other   Which of the following worsen your pain lying down    medications   Which of the following improve or reduce your pain standing    nothing relieves the pain   What number best describes your average pain for the past week:  0 = No pain  to  10 = Worst pain imaginable 8   What number best describes your LOWEST pain in past 24 hours:  0 = No pain  to  10 = Worst pain imaginable 0   What number best describes your WORST pain in past 24 hours:  0 = No pain  to  10 = Worst pain imaginable 8   When is your pain worst PM    Night   What non-medicine treatments have you already had for your pain pain clinic    relaxation training    acupuncture    counseling    " surgery    exercise    other                MEDICATIONS FOR PAIN:   SKYRIZI injections - 3 infusions one month apart     Trazodone 200mg qpm     Bentyl 10mg QID nausea  Reglan 10mg QID PRN    INJECTIONS:   NONE    SURGICAL HISTORY:   S/P esophagoscopy, gastroscopy, duodenum anoscopy and pouchoscopy February 13, 2025  S/P colectomy    IMAGING:  CT ABDOMEN and PELVIS 11/10/2024:   FINDINGS:   LOWER CHEST: Lung bases clear.     HEPATOBILIARY: Normal.     PANCREAS: Normal.     SPLEEN: Normal.     ADRENAL GLANDS: Normal.     KIDNEYS/BLADDER: Normal.     BOWEL: Status post colectomy with ileoanal anastomosis. Bowel is normal caliber. Difficult to exclude wall thickening of distal ileum and pouch. No free fluid or surrounding inflammation.     LYMPH NODES: Normal.     VASCULATURE: Incidental note of circumaortic left renal vein.     PELVIC ORGANS: Normal.     MUSCULOSKELETAL: Bilateral L5 pars defects. Minimal anterolisthesis L5 on S1.                                                                   IMPRESSION:   1.  Status post colectomy. Underdistention versus wall thickening of distal small bowel and ileal pouch. Enteritis not excluded.  2.  No bowel obstruction or abscess.      Past Medical History:  No past medical history on file.    Past Surgical History:  Past Surgical History:   Procedure Laterality Date    ESOPHAGOSCOPY, GASTROSCOPY, DUODENOSCOPY (EGD), COMBINED N/A 2/13/2025    Procedure: Esophagoscopy, gastroscopy, duodenoscopy (EGD), biopsy;  Surgeon: Jam David MD;  Location: [facility] OR    POUCHOSCOPY N/A 2/13/2025    Procedure: Pouchoscopy with biopsy;  Surgeon: Jam David MD;  Location: [facility] [place]       Medications:  Current Outpatient Medications   Medication Sig Dispense Refill    budesonide (ENTOCORT EC) 3 MG EC capsule Take 3 capsules (9 mg) by mouth every morning for 30 days, THEN 2 capsules (6 mg) every morning for 14 days, THEN 1 capsule (3 mg) every morning for 14 days. 132  capsule 0    clindamycin (CLEOCIN) 300 MG capsule Take 1 capsule (300 mg) by mouth 3 times daily. 28 capsule 0    dicyclomine (BENTYL) 10 MG capsule Take 1 capsule (10 mg) by mouth 4 times daily as needed (cramping abdominal pain). 240 capsule 0    emtricitabine-tenofovir (TRUVADA) 200-300 MG per tablet Take 1 tablet by mouth daily. 90 tablet 2    metoclopramide (REGLAN) 10 MG tablet Take 10 mg by mouth 4 times daily (before meals and nightly).      minoxidil (LONITEN) 2.5 MG tablet Take 1 tablet (2.5 mg) by mouth daily. 30 tablet 11    Pediatric Multiple Vitamins (FLINTSTONES MULTIVITAMIN) CHEW Take 1 chew tab by mouth daily.      risankizumab-rzaa (SKYRIZI) 600 MG/10ML injection Inject 10 mLs (600 mg) into the vein every 4 weeks.      traZODone (DESYREL) 100 MG tablet Take 2 tablets (200 mg) by mouth nightly as needed for sleep. 30 tablet 3       Allergies:     Allergies   Allergen Reactions    Ferumoxytol Shortness Of Breath and Other (See Comments)     Loss of consciousness     Morphine Shortness Of Breath     Itching     Morpholine Salicylate Hives       Family history:  No family history on file.    Physical Exam:  Vitals:    05/20/25 0858   BP: 101/64   Pulse: 66   SpO2: 100%       GENERAL: alert and no distress  EYES: Eyes grossly normal to inspection.  No discharge or erythema, or obvious scleral/conjunctival abnormalities.  RESP: No audible wheeze, cough, or visible cyanosis.    SKIN: Visible skin clear. No significant rash, abnormal pigmentation or lesions.  NEURO: Cranial nerves grossly intact.  Mentation and speech appropriate for age.  PSYCH: Appropriate affect, tone, and pace of words        ASSESSMENT/PLAN:  The following recommendations were given to the patient. Diagnosis, treatment options, risks, benefits, and alternatives were discussed, and all questions were answered. The patient expressed understanding of the plan for management.     Joseph R Barthel is a 39 year old male with a past medical  history of Crohn's disease and a mental health history significant for anxiety and depression who is being seen at the pain clinic for the following chronic pain conditions.      1. Crohn's disease of small intestine with other complication (H) (Primary)  Patient has a diagnosis of Crohn's disease and is being followed by gastroenterology.  He has chronic abdominal pain nausea and vomiting.  He recently started Skyrizi infusions and will soon start home injections.  His current care plan includes an order to go to the infusion clinic for IV fluids if needed and to utilize the ER for pain crises.    He previously was part of a pain clinic in [place] that was giving him chronic opioids to treat his abdominal pain.  Explained that I do not recommend long-term use of chronic opioids to treat chronic pain due to complications such as physical tolerance and many other side effects.  He is only 39 years old and I do not recommend starting traditional opioids.  We did briefly discussed other medications like antidepressants and agents like gabapentin and Lyrica, however I would like to leave this to the gastroenterology team as I am not sure how these interact with his Crohn's disease medications.    He was interested in medical cannabis certification and I did do that today.  Explained that he will need to make an appointment at the pharmacy to buy product.  I also recommended that he at least notify his gastroenterologist, Dr. David that he has been certified to approve use of this.      We also discussed having him ask his primary care provider for a referral to psychiatry for better management of his anxiety and depression.  He feels that these play a big role in his perception of pain.  Could also consider referral to an individual therapist.    I am not recommending any interventional procedures.    I am recommending that he continue to follow-up with his gastroenterology team.      - Pain Management   Referral      MEDICATIONS:     No orders of the defined types were placed in this encounter.        - Continue other medications without change           FOLLOW UP: With gastroenterology      BILLING TIME DOCUMENTATION:   The total TIME spent on this patient on the date of the encounter/appointment was 72 minutes.      TOTAL TIME includes:   Time spent preparing to see the patient (reviewing records and tests) - 8 min  Time spent face to face (or over the phone) with the patient - 52 min  Time spent ordering tests, medications, procedures and referrals - 0 min  Time spent Referring and communicating with other healthcare professionals - 0 min  Time spent documenting clinical information in Epic - 12 min       NARESH EDGE MD   Pain Management

## 2025-05-20 ENCOUNTER — OFFICE VISIT (OUTPATIENT)
Dept: PALLIATIVE MEDICINE | Facility: CLINIC | Age: 40
End: 2025-05-20
Attending: INTERNAL MEDICINE
Payer: COMMERCIAL

## 2025-05-20 VITALS — OXYGEN SATURATION: 100 % | HEART RATE: 66 BPM | SYSTOLIC BLOOD PRESSURE: 101 MMHG | DIASTOLIC BLOOD PRESSURE: 64 MMHG

## 2025-05-20 DIAGNOSIS — K50.018 CROHN'S DISEASE OF SMALL INTESTINE WITH OTHER COMPLICATION (H): Primary | ICD-10-CM

## 2025-05-20 PROCEDURE — 1125F AMNT PAIN NOTED PAIN PRSNT: CPT | Performed by: ANESTHESIOLOGY

## 2025-05-20 PROCEDURE — 3078F DIAST BP <80 MM HG: CPT | Performed by: ANESTHESIOLOGY

## 2025-05-20 PROCEDURE — 3074F SYST BP LT 130 MM HG: CPT | Performed by: ANESTHESIOLOGY

## 2025-05-20 PROCEDURE — 99205 OFFICE O/P NEW HI 60 MIN: CPT | Performed by: ANESTHESIOLOGY

## 2025-05-20 ASSESSMENT — PAIN SCALES - GENERAL: PAINLEVEL_OUTOF10: MILD PAIN (1)

## 2025-05-20 NOTE — PATIENT INSTRUCTIONS
You should check your email for the medical cannabis certification.  Complete your end of it and then make an appointment with the pharmacist at the medical cannabis dispensary to buy product.     I would defer to gastroenterology to manage the pain and nausea/vomiting associated with your chron's.       Hyacinth Simon MD

## 2025-05-22 ENCOUNTER — VIRTUAL VISIT (OUTPATIENT)
Dept: GASTROENTEROLOGY | Facility: CLINIC | Age: 40
End: 2025-05-22
Payer: COMMERCIAL

## 2025-05-22 DIAGNOSIS — K50.018 CROHN'S DISEASE OF SMALL INTESTINE WITH OTHER COMPLICATION (H): ICD-10-CM

## 2025-05-22 DIAGNOSIS — R11.2 NAUSEA AND VOMITING, UNSPECIFIED VOMITING TYPE: Primary | ICD-10-CM

## 2025-05-22 DIAGNOSIS — R10.84 ABDOMINAL PAIN, GENERALIZED: ICD-10-CM

## 2025-05-22 RX ORDER — METOCLOPRAMIDE 10 MG/1
10 TABLET ORAL DAILY PRN
Qty: 30 TABLET | Refills: 3 | Status: SHIPPED | OUTPATIENT
Start: 2025-05-22

## 2025-05-22 RX ORDER — PREGABALIN 25 MG/1
25 CAPSULE ORAL 2 TIMES DAILY
Qty: 60 CAPSULE | Refills: 3 | Status: SHIPPED | OUTPATIENT
Start: 2025-05-22

## 2025-05-22 NOTE — PROGRESS NOTES
Virtual Visit Details    Type of service:  Video Visit     Originating Location (pt. Location): Home    Distant Location (provider location):  On-site  Platform used for Video Visit: Ifrah  Video start: 12:02 PM  Video end: 12:30 PM    IBD CLINIC VISIT    CC/REFERRING MD:  Gina Strong  REASON FOR CONSULTATION: follow up CD    ASSESSMENT/PLAN:    Vomiting and abdominal pain - chronic (present for many years). At this time I think his Crohn's Disease is not a driving factor for these symptoms. There has been no change with Entocort or Skyrizi thus far. No evidence of obstruction on MRE. We discussed treatment of his Crohn's is very important to control the inflammation we saw on his pouchoscopy earlier this year but we need to consider other reasons for vomiting, etc. Differential diagnosis includes delayed gastric emptying, anxiety, functional vomiting syndrome. We discussed getting a gastric emptying study but he is concerned for cost which I understand. Therefore we will hold off for now. We did discuss focusing on a gastroparesis diet with soft foods low in fat with smaller, more frequent meals. We also discussed liquid foods are tolerated often better than solids. We will try Lyrica to see if this helps symptoms.    -start Lyrica 25 mg PO daily. After 7 days increase to BID. Give us an update in 2-3 weeks. If needed can increase up further (to max dose 150 mg total daily in 2 divided doses - could consider a little higher dose if needed)  -if Lyrica does not help he can try medical cannabis. The pain clinic has now registered him. He could try this with the Lyrica but he wants to try one thing at a time which is a good plan  -follow gastroparesis diet  - he declines follow up with nutritionist at this time  -I will clarify with Tammi Chester (nutritionist) what Etel[employer] shake he was using to see if we can try Rx again  -ok to take PRN metoclopramide up for vomiting (4-6 times per week is ok) as he finds  this most helpful. Risks of tardive dyskinesia are still present but should be much lower with PRN dosing. Discussed risks of TD.   -in future he would benefit from long term follow up with psychology - he requests to hold off for now given cost  -in future can consider gastric emptying study but this will not  now  -continue Crohn's therapy plan    2. Crohn's Disease of the pre-pouch ileum and pouch - as discussed above I do no think this is the driving factor for vomiting but he still requires treatment. He is not having symptoms of obstruction and MRE showed no bowel dilation.    -continue Skyrizi  -taper off Entocort  -repeat pouchoscopy 6 months after Skyrizi start with advanced endoscopy to dilate stricture (consider stent if appropriate)  -labs every 3 months    3. Iron deficiency anemia - likely related to Crohn's Disease. Now s/p iron infusions. Follow with repeat labs. Due now.    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  Current CD medications: none  Prior IBD surgeries:   -total colectomy 2001/2002  -IPAA formation  -multiple abdominal surgeries due to small bowel obstruction - s/p partial small bowel removal  Prior IBD Medications:  -prednisone - prior to colectomy  -mesalamine products - did not respond - prior to colectomy  -?azathiprine prior to colectomy - he is not sure - if he tried it, it did not control his UC  -infliximab (Remicade) infusions - did not respond (prior to colectomy)  -adalimumab (Humira) [id number] - treated for 1 year after pouchoscopy in 2017 that showed concern for Crohn's of the pouch and pre-pouch ileum. He reports this did not change his chronic vomiting    DRUG MONITORING  TPMT enzyme activity: 33    6-TGN/6-MMPN levels: --    Biologic concentration: --    DISEASE ASSESSMENT  Labs  Recent Labs    Lab Test 02/13/25  1137 11/10/24  0453   SED  --  16*   HGB 10.9* 10.7*     Fecal calprotectin: none  Endoscopy:   - Pouchoscopy 2/2025 - inflammation in distal pre-pouch ileum and pouch inlet. Stricture at pouch inlet which was dilated to 10mm  - EGD 2/2025 - erythematous mucosa in stomach. Biopsies with chronic inflammation   -pouchoscopy 4/2017 - ileal pouch with a few small aphthous ulcers. Ileorectal anastomosis narrowed, ulcerated with granulation tissue. Gastroscope could not be passed and could only be passed with pediatric gastroscope. Elías-TI (pre-pouch ileum?) evaluated 40 cm with several aphthous ulcer and edema. Findings suspicious for Crohn's Disease. Pathology: elías-TI focal acute ileitis. Rectal pouch - chronic active pouchitis. Ileo-rectal anastomosis. Granulation tissue with chronic active inflammation. Pouch biopsies with ileal mucosa and no inflammation  -EGD 4/20017 - normal esophagus, normal stomach and normal duodenum. Duodenum biopsies normal. Stomach biopsies unremarkable with no h pylori  Enterography:   - MRE 1/2025 - short segment mild active Crohn's disease at small bowel anastomosis and proximal to ileal J pouch.   -CTE at outside hospital 2017 - dilated rectum, folds prominent in proximal small bowel (nonspecific). No inflammatory changes  C diff: none    sIBDQ:   IBDQ Score Date IBDQ - Total Score  (Higher score better)   12/14/2024  10:27 AM 43       IBD Health Care Maintenance:    Not discussed today    Misc:  -- Avoid tobacco use  -- Avoid NSAIDs as there is potentially a 25% chance of causing an IBD flare    Return to clinic as scheduled in July    Thank you for this consultation.  It was a pleasure to participate in the care of this patient; please contact us with any further questions.  I spent a total of 60 minutes during the day of encounter performed chart review, meeting with patient, patient counseling, care coordination, and documentation.    The longitudinal plan of care  for the diagnosis(es)/condition(s) as documented were addressed during this visit. Due to the added complexity in care, I will continue to support this patient in the subsequent management and with the ongoing continuity of care    This note was created with voice recognition software, and while reviewed for accuracy, typos may remain.     Jam David MD  Division of Gastroenterology, Hepatology and Nutrition  [facility]  Pager: [phone]      HPI:   Currently, here today to follow up on ongoing vomiting and generalized abdominal discomfort.     This is long standing and present for many years. Precipitated mainly by eating. Random in what causes it. Can be with any food but is not reliable. Sometimes can even be with liquids and yet other times he will tolerate things like pizza just fine. Happens 4-6 times per week. Usually 4. Does not have nausea. Just vomits. After eating will feel abdominal pain that then triggers vomiting and then he will feel better.    He recently had a tooth infection and was on antibiotics - things were a bit worse then.    Symptoms have been stable for years. He has wondered whether anxiety makes things worse - he thinks it does. He was previously following with a psychologist but due to cost of medical care for crohn's he had to stop meeting with them.    He has met with GI nutrition to discuss nutrition recommendations. He did like Etel[employer]ina Farm supplement the best but could not get a script for this and costs are a limiting factor. We discussed a gastric emptying study but cost is limiting and he would like to hold off.    He finds PRN metoclopramide can be helpful to abort episodes. Taking about 4 times per week. Also dicyclomine 30 min before meal may be helpful - he is not sure. THC has been helpful.    He has avoided eating because of these symptoms and has lost weight - 20 lbs.    He is having 3-4 BMs per day. No obstructive symptoms.     No change in vomiting or abdominal  pain since starting Entocort (now almost tapered off) or Skyrizi.     He met with the pain clinic yesterday. They did not recommend long term opioids which is good. They did discuss the option of lyrica but did not want to prescribe it themselves. They asked the patient to return to GI clinic to discuss further. He contacted us and is here today in clinic.    ROS:    No fevers or chills  No weight loss  No blurry vision, double vision or change in vision  No sore throat  No lymphadenopathy  No headache, paraesthesias, or weakness in a limb  No shortness of breath or wheezing  No chest pain or pressure  No arthralgias or myalgias  No rashes or skin changes  No odynophagia or dysphagia  No BRBPR, hematochezia, melena  No dysuria, frequency or urgency  No hot/cold intolerance or polyria  No anxiety or depression    Extra intestinal manifestations of IBD:  No uveitis/episcleritis  No aphthous ulcers   No arthritis   No erythema nodosum/pyoderma gangrenosum.     PERTINENT PAST MEDICAL HISTORY:  No past medical history on file.    PREVIOUS SURGERIES:  Past Surgical History:   Procedure Laterality Date    ESOPHAGOSCOPY, GASTROSCOPY, DUODENOSCOPY (EGD), COMBINED N/A 2/13/2025    Procedure: Esophagoscopy, gastroscopy, duodenoscopy (EGD), biopsy;  Surgeon: Jam David MD;  Location: [facility] OR    POUCHOSCOPY N/A 2/13/2025    Procedure: Pouchoscopy with biopsy;  Surgeon: Jam David MD;  [place] [facility]       PREVIOUS ENDOSCOPY:  No results found for this or any previous visit.]    ALLERGIES:     Allergies   Allergen Reactions    Ferumoxytol Shortness Of Breath and Other (See Comments)     Loss of consciousness     Morphine Shortness Of Breath     Itching     Morpholine Salicylate Hives       PERTINENT MEDICATIONS:    Current Outpatient Medications:     budesonide (ENTOCORT EC) 3 MG EC capsule, Take 3 capsules (9 mg) by mouth every morning for 30 days, THEN 2 capsules (6 mg) every morning for 14  days, THEN 1 capsule (3 mg) every morning for 14 days., Disp: 132 capsule, Rfl: 0    clindamycin (CLEOCIN) 300 MG capsule, Take 1 capsule (300 mg) by mouth 3 times daily., Disp: 28 capsule, Rfl: 0    dicyclomine (BENTYL) 10 MG capsule, Take 1 capsule (10 mg) by mouth 4 times daily as needed (cramping abdominal pain)., Disp: 240 capsule, Rfl: 0    emtricitabine-tenofovir (TRUVADA) 200-300 MG per tablet, Take 1 tablet by mouth daily., Disp: 90 tablet, Rfl: 2    metoclopramide (REGLAN) 10 MG tablet, Take 10 mg by mouth 4 times daily (before meals and nightly)., Disp: , Rfl:     minoxidil (LONITEN) 2.5 MG tablet, Take 1 tablet (2.5 mg) by mouth daily., Disp: 30 tablet, Rfl: 11    Pediatric Multiple Vitamins (FLINTSTONES MULTIVITAMIN) CHEW, Take 1 chew tab by mouth daily., Disp: , Rfl:     risankizumab-rzaa (SKYRIZI) 600 MG/10ML injection, Inject 10 mLs (600 mg) into the vein every 4 weeks., Disp: , Rfl:     traZODone (DESYREL) 100 MG tablet, Take 2 tablets (200 mg) by mouth nightly as needed for sleep., Disp: 30 tablet, Rfl: 3    SOCIAL HISTORY:  Social History     Socioeconomic History    Marital status: Single     Spouse name: Not on file    Number of children: Not on file    Years of education: Not on file    Highest education level: Not on file   Occupational History    Not on file   Tobacco Use    Smoking status: Some Days     Types: Cigarettes     Passive exposure: Current    Smokeless tobacco: Never    Tobacco comments:     Smokes 5-10 cigarettes occasional once a month   Vaping Use    Vaping status: Never Used   Substance and Sexual Activity    Alcohol use: Not on file    Drug use: Not on file    Sexual activity: Not on file   Other Topics Concern    Not on file   Social History Narrative    Not on file     Social Drivers of Health     Financial Resource Strain: Low Risk  (11/8/2024)    Financial Resource Strain     Within the past 12 months, have you or your family members you live with been unable to get  utilities (heat, electricity) when it was really needed?: No   Food Insecurity: Low Risk  (11/8/2024)    Food Insecurity     Within the past 12 months, did you worry that your food would run out before you got money to buy more?: No     Within the past 12 months, did the food you bought just not last and you didn t have money to get more?: No   Transportation Needs: Low Risk  (11/8/2024)    Transportation Needs     Within the past 12 months, has lack of transportation kept you from medical appointments, getting your medicines, non-medical meetings or appointments, work, or from getting things that you need?: No   Physical Activity: Unknown (11/8/2024)    Exercise Vital Sign     Days of Exercise per Week: 3 days     Minutes of Exercise per Session: Not on file   Stress: Stress Concern Present (11/8/2024)    [employer] - Occupational Stress Questionnaire     Feeling of Stress : Very much   Social Connections: Unknown (11/8/2024)    Social Connection and Isolation Panel [NHANES]     Frequency of Communication with Friends and Family: Not on file     Frequency of Social Gatherings with Friends and Family: Three times a week     Attends [employer] Services: Not on file     Active Member of Clubs or Organizations: Not on file     Attends Club or Organization Meetings: Not on file     Marital Status: Not on file   Interpersonal Safety: Low Risk  (11/8/2024)    Interpersonal Safety     Do you feel physically and emotionally safe where you currently live?: Yes     Within the past 12 months, have you been hit, slapped, kicked or otherwise physically hurt by someone?: No     Within the past 12 months, have you been humiliated or emotionally abused in other ways by your partner or ex-partner?: No   Housing Stability: High Risk (11/8/2024)    Housing Stability     Do you have housing? : Yes     Are you worried about losing your housing?: Yes       FAMILY HISTORY:  No family history on  file.    Past/family/social history reviewed and no changes    PHYSICAL EXAMINATION:  Constitutional: aaox3, cooperative, pleasant, not dyspneic/diaphoretic, no acute distress  Vitals reviewed: There were no vitals taken for this visit.  Wt:   Wt Readings from Last 2 Encounters:   05/05/25 47.7 kg (105 lb 3.2 oz)   05/02/25 50.8 kg (112 lb)      Eyes: Sclera anicteric/injected  Respiratory: Unlabored breathing  Skin: no jaundice  Psych: Flat affect        PERTINENT STUDIES:  Most recent CBC:  Recent Labs   Lab Test 02/13/25  1137 11/10/24  0453   WBC 8.9 12.8*   HGB 10.9* 10.7*   HCT 34.9* 35.4*    514*     Most recent hepatic panel:  Recent Labs   Lab Test 04/29/25  0853 03/28/25  1310   ALT 22 12   AST 20 17     Most recent creatinine:  Recent Labs   Lab Test 03/06/25  1614 11/10/24  0453   CR 0.99 0.98

## 2025-05-22 NOTE — PATIENT INSTRUCTIONS
It was a pleasure meeting with you today and discussing your healthcare plan. Below is a summary of what we covered:    Continue the Skyrizi as you are    You can continue to decrease the dose of Entocort and stop when planned    Please try the Lyrica as discussed. Take once a day for 7 days and then increase to twice daily. Give us an update in 3-4 weeks and we can increase further if needed    Please follow the dietary recommendations that you discussed with the GI nutritionist. Remember small meals that are low in fat are tolerated better. Liquid diet is usually tolerated the best. I will see if we can get the Etelvina Farm prescription sent.    Follow up as scheduled in July      Please see below for any additional questions and scheduling guidelines.    Sign up for [facility]: [employer] patient portal serves as a secure platform for accessing your medical records from the [facility]. Additionally, [employer] facilitates easy, timely, and secure messaging with your care team. If you have not signed up, you may do so by using the provided code or calling [phone].    Coordinating your care after your visit:  There are multiple options for scheduling your follow-up care based on your provider's recommendation.    How do I schedule a follow-up clinic appointment:   After your appointment, you may receive scheduling assistance with the Clinic Coordinators by having a seat in the waiting room and a Clinic Coordinator will call you up to schedule.  Virtual visits or after you leave the clinic:  Your provider has placed a follow-up order in the [facility] [employer] [facility] portal for scheduling your return appointment. A member of the scheduling team will contact you to schedule.  [facility] Scheduling: Timely scheduling through [employer] is advised to ensure appointment availability.   Call to schedule: You may schedule your follow-up appointment(s) by calling [phone], option 1.    How do I schedule my endoscopy or colonoscopy  procedure:  If a procedure, such as a colonoscopy or upper endoscopy was ordered by your provider, the scheduling team will contact you to schedule this procedure. Or you may choose to call to schedule at   [phone], option 2.  Please allow 20-30 minutes when scheduling a procedure.    How do I get my blood work done? To get your blood work done, you need to schedule a lab appointment at an [facility]. There are multiple ways to schedule:   At the clinic: The Clinic Coordinator you meet after your visit can help you schedule a lab appointment.   [facility] scheduling: [facility] offers online lab scheduling at all [facility] laboratory locations.   Call to schedule: You can call [phone] to schedule your lab appointment.    How do I schedule my imaging study: To schedule imaging studies, such as CT scans, ultrasounds, MRIs, or X-rays, contact Imaging Services at [phone].    How do I schedule a referral to another doctor: If your provider recommended a referral to another specialist(s), the referral order was placed by your provider. You will receive a phone call to schedule this referral, or you may choose to call the number attached to the referral to self-schedule.    For Post-Visit Question(s):  For any inquiries following today's visit:  Please utilize [facility] messaging and allow 48 hours for reply or contact the Call Center during normal business hours at [phone], option 3.  For Emergent After-hours questions, contact the On-Call GI Fellow through the [facility]  at [phone].  In addition, you may contact your Nurse directly using the provided contact information.    Test Results:  Test results will be accessible via [facility] in compliance with the 21st Century Cures Act. This means that your results will be available to you at the same time as your provider. Often you may see your results before your provider does. Results are reviewed by staff within  two weeks with communication follow-up. Results may be released in the patient portal prior to your care team review.    Prescription Refill(s):  Medication prescribed by your provider will be addressed during your visit. For future refills, please coordinate with your pharmacy. If you have not had a recent clinic visit or routine labs, for your safety, your provider may not be able to refill your prescription.

## 2025-05-22 NOTE — LETTER
5/22/2025      Joseph R Barthel  [id number]  [facility] [address]      Dear Colleague,    Thank you for referring your patient, Joseph R Barthel, to the [facility] [place]. Please see a copy of my visit note below.    Virtual Visit Details    Type of service:  Video Visit     Originating Location (pt. Location): Home    Distant Location (provider location):  On-site  Platform used for Video Visit: [employer]  Video start: 12:02 PM  Video end: 12:30 PM    IBD CLINIC VISIT    CC/REFERRING MD:  Gina Strong  REASON FOR CONSULTATION: follow up CD    ASSESSMENT/PLAN:    Vomiting and abdominal pain - chronic (present for many years). At this time I think his Crohn's Disease is not a driving factor for these symptoms. There has been no change with Entocort or Skyrizi thus far. No evidence of obstruction on MRE. We discussed treatment of his Crohn's is very important to control the inflammation we saw on his pouchoscopy earlier this year but we need to consider other reasons for vomiting, etc. Differential diagnosis includes delayed gastric emptying, anxiety, functional vomiting syndrome. We discussed getting a gastric emptying study but he is concerned for cost which I understand. Therefore we will hold off for now. We did discuss focusing on a gastroparesis diet with soft foods low in fat with smaller, more frequent meals. We also discussed liquid foods are tolerated often better than solids. We will try Lyrica to see if this helps symptoms.    -start Lyrica 25 mg PO daily. After 7 days increase to BID. Give us an update in 2-3 weeks. If needed can increase up further (to max dose 150 mg total daily in 2 divided doses - could consider a little higher dose if needed)  -if Lyrica does not help he can try medical cannabis. The pain clinic has now registered him. He could try this with the Lyrica but he wants to try one thing at a time which is a good plan  -follow  gastroparesis diet  - he declines follow up with nutritionist at this time  -I will clarify with Tammi Chester (nutritionist) what [employer] shake he was using to see if we can try Rx again  -ok to take PRN metoclopramide up for vomiting (4-6 times per week is ok) as he finds this most helpful. Risks of tardive dyskinesia are still present but should be much lower with PRN dosing. Discussed risks of TD.   -in future he would benefit from long term follow up with psychology - he requests to hold off for now given cost  -in future can consider gastric emptying study but this will not  now  -continue Crohn's therapy plan    2. Crohn's Disease of the pre-pouch ileum and pouch - as discussed above I do no think this is the driving factor for vomiting but he still requires treatment. He is not having symptoms of obstruction and MRE showed no bowel dilation.    -continue Skyrizi  -taper off Entocort  -repeat pouchoscopy 6 months after Skyrizi start with advanced endoscopy to dilate stricture (consider stent if appropriate)  -labs every 3 months    3. Iron deficiency anemia - likely related to Crohn's Disease. Now s/p iron infusions. Follow with repeat labs. Due now.    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  Current CD medications: none  Prior IBD surgeries:   -total colectomy 2001/2002  -IPAA formation  -multiple abdominal surgeries due to small bowel obstruction - s/p partial small bowel removal  Prior IBD Medications:  -prednisone - prior to colectomy  -mesalamine products - did not respond - prior to colectomy  -?azathiprine prior to colectomy - he is not sure - if he tried it, it did not control his UC  -infliximab (Remicade) infusions - did not respond (prior to colectomy)  -adalimumab (Humira) 6197-6341 - treated for 1 year after  pouchoscopy in 2017 that showed concern for Crohn's of the pouch and pre-pouch ileum. He reports this did not change his chronic vomiting    DRUG MONITORING  TPMT enzyme activity: 33    6-TGN/6-MMPN levels: --    Biologic concentration: --    DISEASE ASSESSMENT  Labs  Recent Labs   Lab Test 02/13/25  1137 11/10/24  0453   SED  --  16*   HGB 10.9* 10.7*     Fecal calprotectin: none  Endoscopy:   - Pouchoscopy 2/2025 - inflammation in distal pre-pouch ileum and pouch inlet. Stricture at pouch inlet which was dilated to 10mm  - EGD 2/2025 - erythematous mucosa in stomach. Biopsies with chronic inflammation   -pouchoscopy 4/2017 - ileal pouch with a few small aphthous ulcers. Ileorectal anastomosis narrowed, ulcerated with granulation tissue. Gastroscope could not be passed and could only be passed with pediatric gastroscope. Elías-TI (pre-pouch ileum?) evaluated 40 cm with several aphthous ulcer and edema. Findings suspicious for Crohn's Disease. Pathology: elías-TI focal acute ileitis. Rectal pouch - chronic active pouchitis. Ileo-rectal anastomosis. Granulation tissue with chronic active inflammation. Pouch biopsies with ileal mucosa and no inflammation  -EGD 4/20017 - normal esophagus, normal stomach and normal duodenum. Duodenum biopsies normal. Stomach biopsies unremarkable with no h pylori  Enterography:   - MRE 1/2025 - short segment mild active Crohn's disease at small bowel anastomosis and proximal to ileal J pouch.   -CTE at outside hospital 2017 - dilated rectum, folds prominent in proximal small bowel (nonspecific). No inflammatory changes  C diff: none    sIBDQ:   IBDQ Score Date IBDQ - Total Score  (Higher score better)   12/14/2024  10:27 AM 43       IBD Health Care Maintenance:    Not discussed today    Misc:  -- Avoid tobacco use  -- Avoid NSAIDs as there is potentially a 25% chance of causing an IBD flare    Return to clinic as scheduled in July    Thank you for this consultation.  It was a pleasure to  participate in the care of this patient; please contact us with any further questions.  I spent a total of 60 minutes during the day of encounter performed chart review, meeting with patient, patient counseling, care coordination, and documentation.    The longitudinal plan of care for the diagnosis(es)/condition(s) as documented were addressed during this visit. Due to the added complexity in care, I will continue to support this patient in the subsequent management and with the ongoing continuity of care    This note was created with voice recognition software, and while reviewed for accuracy, typos may remain.     Jam David MD  Division of Gastroenterology, Hepatology and Nutrition  [facility]  Pager: [phone]      HPI:   Currently, here today to follow up on ongoing vomiting and generalized abdominal discomfort.     This is long standing and present for many years. Precipitated mainly by eating. Random in what causes it. Can be with any food but is not reliable. Sometimes can even be with liquids and yet other times he will tolerate things like pizza just fine. Happens 4-6 times per week. Usually 4. Does not have nausea. Just vomits. After eating will feel abdominal pain that then triggers vomiting and then he will feel better.    He recently had a tooth infection and was on antibiotics - things were a bit worse then.    Symptoms have been stable for years. He has wondered whether anxiety makes things worse - he thinks it does. He was previously following with a psychologist but due to cost of medical care for crohn's he had to stop meeting with them.    He has met with GI nutrition to discuss nutrition recommendations. He did like [employer] supplement the best but could not get a script for this and costs are a limiting factor. We discussed a gastric emptying study but cost is limiting and he would like to hold off.    He finds PRN metoclopramide can be helpful to abort episodes. Taking about 4  times per week. Also dicyclomine 30 min before meal may be helpful - he is not sure. THC has been helpful.    He has avoided eating because of these symptoms and has lost weight - 20 lbs.    He is having 3-4 BMs per day. No obstructive symptoms.     No change in vomiting or abdominal pain since starting Entocort (now almost tapered off) or Skyrizi.     He met with the pain clinic yesterday. They did not recommend long term opioids which is good. They did discuss the option of lyrica but did not want to prescribe it themselves. They asked the patient to return to GI clinic to discuss further. He contacted us and is here today in clinic.    ROS:    No fevers or chills  No weight loss  No blurry vision, double vision or change in vision  No sore throat  No lymphadenopathy  No headache, paraesthesias, or weakness in a limb  No shortness of breath or wheezing  No chest pain or pressure  No arthralgias or myalgias  No rashes or skin changes  No odynophagia or dysphagia  No BRBPR, hematochezia, melena  No dysuria, frequency or urgency  No hot/cold intolerance or polyria  No anxiety or depression    Extra intestinal manifestations of IBD:  No uveitis/episcleritis  No aphthous ulcers   No arthritis   No erythema nodosum/pyoderma gangrenosum.     PERTINENT PAST MEDICAL HISTORY:  No past medical history on file.    PREVIOUS SURGERIES:  Past Surgical History:   Procedure Laterality Date     ESOPHAGOSCOPY, GASTROSCOPY, DUODENOSCOPY (EGD), COMBINED N/A 2/13/2025    Procedure: Esophagoscopy, gastroscopy, duodenoscopy (EGD), biopsy;  Surgeon: Jam David MD;  Location: [facility] [place] [facility] OR     POUCHOSCOPY N/A 2/13/2025    Procedure: Pouchoscopy with biopsy;  Surgeon: Jam David MD;  Location: [facility] [place] [facility] OR       PREVIOUS ENDOSCOPY:  No results found for this or any previous visit.]    ALLERGIES:     Allergies   Allergen Reactions     Ferumoxytol Shortness Of Breath and Other (See Comments)     Loss of consciousness       Morphine Shortness Of Breath     Itching      Morpholine Salicylate Hives       PERTINENT MEDICATIONS:    Current Outpatient Medications:      budesonide (ENTOCORT EC) 3 MG EC capsule, Take 3 capsules (9 mg) by mouth every morning for 30 days, THEN 2 capsules (6 mg) every morning for 14 days, THEN 1 capsule (3 mg) every morning for 14 days., Disp: 132 capsule, Rfl: 0     clindamycin (CLEOCIN) 300 MG capsule, Take 1 capsule (300 mg) by mouth 3 times daily., Disp: 28 capsule, Rfl: 0     dicyclomine (BENTYL) 10 MG capsule, Take 1 capsule (10 mg) by mouth 4 times daily as needed (cramping abdominal pain)., Disp: 240 capsule, Rfl: 0     emtricitabine-tenofovir (TRUVADA) 200-300 MG per tablet, Take 1 tablet by mouth daily., Disp: 90 tablet, Rfl: 2     metoclopramide (REGLAN) 10 MG tablet, Take 10 mg by mouth 4 times daily (before meals and nightly)., Disp: , Rfl:      minoxidil (LONITEN) 2.5 MG tablet, Take 1 tablet (2.5 mg) by mouth daily., Disp: 30 tablet, Rfl: 11     Pediatric Multiple Vitamins (FLINTSTONES MULTIVITAMIN) CHEW, Take 1 chew tab by mouth daily., Disp: , Rfl:      risankizumab-rzaa (SKYRIZI) 600 MG/10ML injection, Inject 10 mLs (600 mg) into the vein every 4 weeks., Disp: , Rfl:      traZODone (DESYREL) 100 MG tablet, Take 2 tablets (200 mg) by mouth nightly as needed for sleep., Disp: 30 tablet, Rfl: 3    SOCIAL HISTORY:  Social History     Socioeconomic History     Marital status: Single     Spouse name: Not on file     Number of children: Not on file     Years of education: Not on file     Highest education level: Not on file   Occupational History     Not on file   Tobacco Use     Smoking status: Some Days     Types: Cigarettes     Passive exposure: Current     Smokeless tobacco: Never     Tobacco comments:     Smokes 5-10 cigarettes occasional once a month   Vaping Use     Vaping status: Never Used   Substance and Sexual Activity     Alcohol use: Not on file     Drug use: Not on file     Sexual  activity: Not on file   Other Topics Concern     Not on file   Social History Narrative     Not on file     Social Drivers of Health     Financial Resource Strain: Low Risk  (11/8/2024)    Financial Resource Strain      Within the past 12 months, have you or your family members you live with been unable to get utilities (heat, electricity) when it was really needed?: No   Food Insecurity: Low Risk  (11/8/2024)    Food Insecurity      Within the past 12 months, did you worry that your food would run out before you got money to buy more?: No      Within the past 12 months, did the food you bought just not last and you didn t have money to get more?: No   Transportation Needs: Low Risk  (11/8/2024)    Transportation Needs      Within the past 12 months, has lack of transportation kept you from medical appointments, getting your medicines, non-medical meetings or appointments, work, or from getting things that you need?: No   Physical Activity: Unknown (11/8/2024)    Exercise Vital Sign      Days of Exercise per Week: 3 days      Minutes of Exercise per Session: Not on file   Stress: Stress Concern Present (11/8/2024)    [employer] - Occupational Stress Questionnaire      Feeling of Stress : Very much   Social Connections: Unknown (11/8/2024)    Social Connection and Isolation Panel [NHANES]      Frequency of Communication with Friends and Family: Not on file      Frequency of Social Gatherings with Friends and Family: Three times a week      Attends Mu-ism Services: Not on file      Active Member of Clubs or Organizations: Not on file      Attends Club or Organization Meetings: Not on file      Marital Status: Not on file   Interpersonal Safety: Low Risk  (11/8/2024)    Interpersonal Safety      Do you feel physically and emotionally safe where you currently live?: Yes      Within the past 12 months, have you been hit, slapped, kicked or otherwise physically hurt by someone?: No       Within the past 12 months, have you been humiliated or emotionally abused in other ways by your partner or ex-partner?: No   Housing Stability: High Risk (11/8/2024)    Housing Stability      Do you have housing? : Yes      Are you worried about losing your housing?: Yes       FAMILY HISTORY:  No family history on file.    Past/family/social history reviewed and no changes    PHYSICAL EXAMINATION:  Constitutional: aaox3, cooperative, pleasant, not dyspneic/diaphoretic, no acute distress  Vitals reviewed: There were no vitals taken for this visit.  Wt:   Wt Readings from Last 2 Encounters:   05/05/25 47.7 kg (105 lb 3.2 oz)   05/02/25 50.8 kg (112 lb)      Eyes: Sclera anicteric/injected  Respiratory: Unlabored breathing  Skin: no jaundice  Psych: Flat affect        PERTINENT STUDIES:  Most recent CBC:  Recent Labs   Lab Test 02/13/25  1137 11/10/24  0453   WBC 8.9 12.8*   HGB 10.9* 10.7*   HCT 34.9* 35.4*    514*     Most recent hepatic panel:  Recent Labs   Lab Test 04/29/25  0853 03/28/25  1310   ALT 22 12   AST 20 17     Most recent creatinine:  Recent Labs   Lab Test 03/06/25  1614 11/10/24  0453   CR 0.99 0.98             Again, thank you for allowing me to participate in the care of your patient.        Sincerely,        Jam David MD    Electronically signed

## 2025-05-22 NOTE — NURSING NOTE
Current patient location: Patient declined to provide     Is the patient currently in the state of [place]? YES    Visit mode: VIDEO    If the visit is dropped, the patient can be reconnected by:VIDEO VISIT: Text to cell phone:   Telephone Information:   Mobile [phone]       Will anyone else be joining the visit? NO  (If patient encounters technical issues they should call [phone])    Are changes needed to the allergy or medication list? No    Are refills needed on medications prescribed by this physician? NO    Rooming Documentation:  Questionnaire(s) completed    Reason for visit: RECHECK    Catia HANDLEYF

## 2025-05-27 ENCOUNTER — RESULTS FOLLOW-UP (OUTPATIENT)
Dept: FAMILY MEDICINE | Facility: CLINIC | Age: 40
End: 2025-05-27

## 2025-05-27 ENCOUNTER — RESULTS FOLLOW-UP (OUTPATIENT)
Dept: GASTROENTEROLOGY | Facility: CLINIC | Age: 40
End: 2025-05-27

## 2025-05-27 ENCOUNTER — INFUSION THERAPY VISIT (OUTPATIENT)
Dept: INFUSION THERAPY | Facility: CLINIC | Age: 40
End: 2025-05-27
Attending: INTERNAL MEDICINE
Payer: COMMERCIAL

## 2025-05-27 VITALS
RESPIRATION RATE: 14 BRPM | HEART RATE: 78 BPM | OXYGEN SATURATION: 97 % | DIASTOLIC BLOOD PRESSURE: 64 MMHG | SYSTOLIC BLOOD PRESSURE: 95 MMHG | TEMPERATURE: 98.2 F

## 2025-05-27 DIAGNOSIS — K50.90 CROHN'S DISEASE (H): Primary | ICD-10-CM

## 2025-05-27 DIAGNOSIS — K50.018 CROHN'S DISEASE OF SMALL INTESTINE WITH OTHER COMPLICATION (H): Primary | ICD-10-CM

## 2025-05-27 DIAGNOSIS — Z72.51 HIGH RISK SEXUAL BEHAVIOR, UNSPECIFIED TYPE: ICD-10-CM

## 2025-05-27 DIAGNOSIS — R11.2 NAUSEA AND VOMITING, UNSPECIFIED VOMITING TYPE: ICD-10-CM

## 2025-05-27 DIAGNOSIS — Z20.6 EXPOSURE TO HIV: ICD-10-CM

## 2025-05-27 DIAGNOSIS — R10.84 ABDOMINAL PAIN, GENERALIZED: ICD-10-CM

## 2025-05-27 LAB
ALBUMIN SERPL BCG-MCNC: 3.6 G/DL (ref 3.5–5.2)
ALP SERPL-CCNC: 73 U/L (ref 40–150)
ALT SERPL W P-5'-P-CCNC: 17 U/L (ref 0–70)
ANION GAP SERPL CALCULATED.3IONS-SCNC: 11 MMOL/L (ref 7–15)
AST SERPL W P-5'-P-CCNC: 20 U/L (ref 0–45)
BASOPHILS # BLD AUTO: 0 10E3/UL (ref 0–0.2)
BASOPHILS NFR BLD AUTO: 1 %
BILIRUB DIRECT SERPL-MCNC: <0.08 MG/DL (ref 0–0.3)
BILIRUB SERPL-MCNC: 0.2 MG/DL
BUN SERPL-MCNC: 8.1 MG/DL (ref 6–20)
CALCIUM SERPL-MCNC: 8.7 MG/DL (ref 8.8–10.4)
CHLORIDE SERPL-SCNC: 106 MMOL/L (ref 98–107)
CREAT SERPL-MCNC: 0.76 MG/DL (ref 0.67–1.17)
CRP SERPL-MCNC: <3 MG/L
EGFRCR SERPLBLD CKD-EPI 2021: >90 ML/MIN/1.73M2
EOSINOPHIL # BLD AUTO: 0.6 10E3/UL (ref 0–0.7)
EOSINOPHIL NFR BLD AUTO: 7 %
ERYTHROCYTE [DISTWIDTH] IN BLOOD BY AUTOMATED COUNT: 17.1 % (ref 10–15)
FERRITIN SERPL-MCNC: 97 NG/ML (ref 31–409)
GLUCOSE SERPL-MCNC: 95 MG/DL (ref 70–99)
HCO3 SERPL-SCNC: 24 MMOL/L (ref 22–29)
HCT VFR BLD AUTO: 37.9 % (ref 40–53)
HGB BLD-MCNC: 12.6 G/DL (ref 13.3–17.7)
HIV 1+2 AB+HIV1 P24 AG SERPL QL IA: NONREACTIVE
IMM GRANULOCYTES # BLD: 0 10E3/UL
IMM GRANULOCYTES NFR BLD: 0 %
IRON BINDING CAPACITY (ROCHE): 275 UG/DL (ref 240–430)
IRON SATN MFR SERPL: 10 % (ref 15–46)
IRON SERPL-MCNC: 27 UG/DL (ref 61–157)
LYMPHOCYTES # BLD AUTO: 0.5 10E3/UL (ref 0.8–5.3)
LYMPHOCYTES NFR BLD AUTO: 6 %
MCH RBC QN AUTO: 30.1 PG (ref 26.5–33)
MCHC RBC AUTO-ENTMCNC: 33.2 G/DL (ref 31.5–36.5)
MCV RBC AUTO: 91 FL (ref 78–100)
MONOCYTES # BLD AUTO: 0.6 10E3/UL (ref 0–1.3)
MONOCYTES NFR BLD AUTO: 7 %
NEUTROPHILS # BLD AUTO: 7 10E3/UL (ref 1.6–8.3)
NEUTROPHILS NFR BLD AUTO: 80 %
NRBC # BLD AUTO: 0 10E3/UL
NRBC BLD AUTO-RTO: 0 /100
PLATELET # BLD AUTO: 345 10E3/UL (ref 150–450)
POTASSIUM SERPL-SCNC: 4 MMOL/L (ref 3.4–5.3)
PROT SERPL-MCNC: 6 G/DL (ref 6.4–8.3)
RBC # BLD AUTO: 4.18 10E6/UL (ref 4.4–5.9)
SODIUM SERPL-SCNC: 141 MMOL/L (ref 135–145)
T PALLIDUM AB SER QL: NONREACTIVE
WBC # BLD AUTO: 8.8 10E3/UL (ref 4–11)

## 2025-05-27 PROCEDURE — 82565 ASSAY OF CREATININE: CPT

## 2025-05-27 PROCEDURE — 36415 COLL VENOUS BLD VENIPUNCTURE: CPT

## 2025-05-27 PROCEDURE — 87389 HIV-1 AG W/HIV-1&-2 AB AG IA: CPT

## 2025-05-27 PROCEDURE — 96365 THER/PROPH/DIAG IV INF INIT: CPT

## 2025-05-27 PROCEDURE — 258N000003 HC RX IP 258 OP 636: Performed by: INTERNAL MEDICINE

## 2025-05-27 PROCEDURE — 82728 ASSAY OF FERRITIN: CPT | Performed by: INTERNAL MEDICINE

## 2025-05-27 PROCEDURE — 86140 C-REACTIVE PROTEIN: CPT

## 2025-05-27 PROCEDURE — 250N000011 HC RX IP 250 OP 636: Performed by: INTERNAL MEDICINE

## 2025-05-27 PROCEDURE — 86780 TREPONEMA PALLIDUM: CPT

## 2025-05-27 PROCEDURE — 83540 ASSAY OF IRON: CPT

## 2025-05-27 PROCEDURE — 82248 BILIRUBIN DIRECT: CPT | Performed by: INTERNAL MEDICINE

## 2025-05-27 PROCEDURE — 85014 HEMATOCRIT: CPT | Performed by: INTERNAL MEDICINE

## 2025-05-27 RX ORDER — DIPHENHYDRAMINE HYDROCHLORIDE 50 MG/ML
25 INJECTION, SOLUTION INTRAMUSCULAR; INTRAVENOUS
Start: 2025-05-27

## 2025-05-27 RX ORDER — ALBUTEROL SULFATE 90 UG/1
1-2 INHALANT RESPIRATORY (INHALATION)
Start: 2025-05-27

## 2025-05-27 RX ORDER — DIPHENHYDRAMINE HYDROCHLORIDE 50 MG/ML
50 INJECTION, SOLUTION INTRAMUSCULAR; INTRAVENOUS
Start: 2025-05-27

## 2025-05-27 RX ORDER — METHYLPREDNISOLONE SODIUM SUCCINATE 40 MG/ML
40 INJECTION INTRAMUSCULAR; INTRAVENOUS
Start: 2025-05-27

## 2025-05-27 RX ORDER — RISANKIZUMAB-RZAA 360 MG/2.4
360 WEARABLE INJECTOR SUBCUTANEOUS
Qty: 2.4 ML | Refills: 2 | Status: SHIPPED | OUTPATIENT
Start: 2025-05-27

## 2025-05-27 RX ORDER — MEPERIDINE HYDROCHLORIDE 25 MG/ML
25 INJECTION INTRAMUSCULAR; INTRAVENOUS; SUBCUTANEOUS
OUTPATIENT
Start: 2025-05-27

## 2025-05-27 RX ORDER — ALBUTEROL SULFATE 0.83 MG/ML
2.5 SOLUTION RESPIRATORY (INHALATION)
OUTPATIENT
Start: 2025-05-27

## 2025-05-27 RX ORDER — HEPARIN SODIUM (PORCINE) LOCK FLUSH IV SOLN 100 UNIT/ML 100 UNIT/ML
5 SOLUTION INTRAVENOUS
OUTPATIENT
Start: 2025-05-27

## 2025-05-27 RX ORDER — HEPARIN SODIUM,PORCINE 10 UNIT/ML
5-20 VIAL (ML) INTRAVENOUS DAILY PRN
OUTPATIENT
Start: 2025-05-27

## 2025-05-27 RX ORDER — EPINEPHRINE 1 MG/ML
0.3 INJECTION, SOLUTION INTRAMUSCULAR; SUBCUTANEOUS EVERY 5 MIN PRN
OUTPATIENT
Start: 2025-05-27

## 2025-05-27 RX ADMIN — DEXTROSE MONOHYDRATE 600 MG: 50 INJECTION, SOLUTION INTRAVENOUS at 08:55

## 2025-05-27 RX ADMIN — DEXTROSE MONOHYDRATE 250 ML: 50 INJECTION, SOLUTION INTRAVENOUS at 08:56

## 2025-05-27 NOTE — PROGRESS NOTES
Infusion Nursing Note:  Joseph R Barthel presents today for Week 8 Skyrizi.    Patient seen by provider today: No   present during visit today: Not Applicable.    Note: Truman is here feeling rather fatigued, he states he has not been sleeping. Objectively, mood is rather depressed. Denies any fevers/chills/night sweats. He does endorse recent tooth infection for which he completed antibiotics and is now seeming to recur. Per Dr. David, ok to proceed with Skyrizi today, pt should follow up with his dentist. Labs collected for Dr. David and PCP today, pt had already urinated this AM so unable to provide first morning urine for sample.     Truman endorses vomiting episodes generally 8-10 hours following skyrizi. Recommended prophylactically taking his prn Reglan approx 6hrs following infusion. Message sent to Dr. David re: Skyrizi home injections, as pt has now completed his Week 0,4,8 infusion induction.      Intravenous Access:  Peripheral IV placed.    Treatment Conditions:  Biological Infusion Checklist:  ~~~ NOTE: If the patient answers yes to any of the questions below, hold the infusion and contact ordering provider or on-call provider.    Have you recently had an elevated temperature, fever, chills, productive cough, coughing for 3 weeks or longer or hemoptysis,  abnormal vital signs, night sweats,  chest pain or have you noticed a decrease in your appetite, unexplained weight loss or fatigue? No  Do you have any open wounds or new incisions? No  Do you have any upcoming hospitalizations or surgeries? Does not include esophagogastroduodenoscopy, colonoscopy, endoscopic retrograde cholangiopancreatography (ERCP), endoscopic ultrasound (EUS), dental procedures or joint aspiration/steroid injections No  Do you currently have any signs of illness or infection or are you on any antibiotics? Yes, ongoing tooth infection, OK to proceed with Skyrizi per Dr. David.  Have you had any new, sudden or  worsening abdominal pain? No  Have you or anyone in your household received a live vaccination in the past 4 weeks? Please note: No live vaccines while on biologic/chemotherapy until 6 months after the last treatment. Patient can receive the flu vaccine (shot only), pneumovax and the Covid vaccine. It is optimal for the patient to get these vaccines mid cycle, but they can be given at any time as long as it is not on the day of the infusion. No  Have you recently been diagnosed with any new nervous system diseases (ie. Multiple sclerosis, Guillain [place], seizures, neurological changes) or cancer diagnosis? Are you on any form of radiation or chemotherapy? No  Are you pregnant or breast feeding or do you have plans of pregnancy in the future? No  Have you been having any signs of worsening depression or suicidal ideations?  (benlysta only) n/a  Have there been any other new onset medical symptoms? No  Have you had any new blood clots? (IVIG only) n/a      Post Infusion Assessment:  Patient tolerated infusion without incident.  Blood return noted pre and post infusion.  Site patent and intact, free from redness, edema or discomfort.  No evidence of extravasations.  Access discontinued per protocol.       Discharge Plan:   Discharge instructions reviewed with: Patient.  Patient and/or family verbalized understanding of discharge instructions and all questions answered.  AVS to patient via [employer].  Patient will return prn, will begin Skyrizi home injections, for next appointment.   Patient discharged in stable condition accompanied by: self.  Departure Mode: Ambulatory.      Verona Webster RN

## 2025-05-27 NOTE — PROGRESS NOTES
Last provider visit: 5/22/2025 - Jam David MD  Next provider visit: 7/15/2025 - Jam David MD  Last Quantiferon: 12/19/2024    Jake GTZ sent under CPA with Jam David MD

## 2025-06-02 DIAGNOSIS — F51.01 PRIMARY INSOMNIA: ICD-10-CM

## 2025-06-02 RX ORDER — TRAZODONE HYDROCHLORIDE 100 MG/1
200 TABLET ORAL
Qty: 60 TABLET | Refills: 9 | Status: SHIPPED | OUTPATIENT
Start: 2025-06-02

## 2025-06-26 NOTE — PROGRESS NOTES
Medication Therapy Management (MTM) Encounter    ASSESSMENT:                            Medication Adherence/Access: No issues identified.    Crohn's Disease:  Murtaza would benefit from continued treatment with Skyrizi 360 mg subcutaneous every 8 weeks. He is due to have his liver function checked. He is up to date on annual tuberculosis screening. No access issues for his advanced therapy are present.       PLAN:                            Continue on Skyrizi 360 mg subcutaneous every 8 weeks. You can do your first injection now.   You are due to have your liver labs monitored. These are standing labs ordered under Andrey Weinstein PA-C. Please complete them at your soonest convenience at any [facility] lab.    Follow-up: 8/29/2025 11:00 AM phone visit    SUBJECTIVE/OBJECTIVE:                          Joseph Barthel is a 39 year old male seen for a follow-up visit.       Reason for visit: Skyrizi check-in.    Allergies/ADRs: Reviewed in chart  Past Medical History: Reviewed in chart  Tobacco: He reports that he has been smoking cigarettes. He has been exposed to tobacco smoke. He has never used smokeless tobacco.Nicotine/Tobacco Cessation Plan  Information offered: Patient not interested at this time  Alcohol: occasional  THC/CBD: yes    Medication Adherence/Access: no issues reported.    Crohn's Disease:   Skyrizi 360 mg subcutaneous every 8 weeks   Metoclopramide 10 mg daily as needed    Murtaza was under the impression he was due for his first subcutaneous dose on 6/30. Based last infusion he was due 6/24. He reports he will do his dose now. He feels that things have definitely improved on the Skyrizi. He is still having some abdominal crmaping in the morning but it is not nearly as often and is more mild during the day. He did try taking Lyrica for about a month to help with this but he didn't notice a difference in his symptoms so he stopped this. He is taking metoclopramide to help with nausea before he eats and  he reports that this helps a lot.     Last provider visit: 2025 - Jam David MD  Next provider visit: 7/15/2025 - Jam David MD  Last labs completed: 2025   Lab frequency: every 3 months   - standing labs available until 4/15/2026  Next labs due: 2025  Last TB screenin2024    PRO-3 for Crohn's Disease    Please select the one best answer for the patient's ability at this time     Over the past week, how many liquid or soft stools have you had on average per day?   5 (When scoring, multiply number by 2= 10)   Over the past week, please rate your average abdominal pain  Severe: 15 points  3. Over the past week, please rate your general well-being    Generally Well: 0 points    Score: 20  <13: Remission  13-21: Mild Activity   22-52: Moderate Activity  >/= 53: Severe Activity     ----------------    I spent 16 minutes with this patient today. All changes were made via collaborative practice agreement with Jam David.     A summary of these recommendations was sent via [employer].    Elysia Hannah PharmD, BCPS  MTM Pharmacist   [facility]   Phone: [phone]    Telemedicine Visit Details  The patient's medications can be safely assessed via a telemedicine encounter.  Type of service:  Telephone visit  Originating Location (pt. Location): Home    Distant Location (provider location):  Off-site  Start Time: 10:31 AM  End Time: 10:47 AM     Medication Therapy Recommendations  No medication therapy recommendations to display

## 2025-06-27 ENCOUNTER — VIRTUAL VISIT (OUTPATIENT)
Dept: PHARMACY | Facility: CLINIC | Age: 40
End: 2025-06-27
Attending: INTERNAL MEDICINE
Payer: COMMERCIAL

## 2025-06-27 VITALS — BODY MASS INDEX: 16.79 KG/M2 | WEIGHT: 107 LBS | HEIGHT: 67 IN

## 2025-06-27 DIAGNOSIS — K50.00 CROHN'S DISEASE OF SMALL INTESTINE (H): Primary | ICD-10-CM

## 2025-06-27 ASSESSMENT — PAIN SCALES - GENERAL: PAINLEVEL_OUTOF10: NO PAIN (0)

## 2025-06-27 NOTE — PATIENT INSTRUCTIONS
"Recommendations from today's MTM visit:                                                      Continue on Skyrizi 360 mg subcutaneous every 8 weeks. You can do your first injection now.   You are due to have your liver labs monitored. These are standing labs ordered under Andrey Weinstein PA-C. Please complete them at your soonest convenience at any [facility] lab.    Follow-up: 8/29/2025 11:00 AM phone visit  It was great speaking with you today.  I value your experience and would be very thankful for your time in providing feedback in our clinic survey. In the next few days, you may receive an email or text message from [employer] with a link to a survey related to your  clinical pharmacist.\"     To schedule another MTM appointment, please call the clinic directly or you may call the MTM scheduling line at [phone].    My Clinical Pharmacist's contact information:                                                      Please feel free to contact me with any questions or concerns you have.      Elysia EwingD, BCPS  MTM Pharmacist   [facility]   Phone: [phone]     "

## 2025-06-27 NOTE — NURSING NOTE
Current patient location: [id number] [facility]   [facility] [address]    Is the patient currently in the state of [place]? YES    Visit mode: TELEPHONE    If the visit is dropped, the patient can be reconnected by:TELEPHONE VISIT: Phone number:   Telephone Information:   Mobile [phone]       Will anyone else be joining the visit? NO  (If patient encounters technical issues they should call [phone]    Are changes needed to the allergy or medication list? Pt confirms medications and allergies are correct, echeck-in completed.     Are refills needed on medications prescribed by this physician? NO    Rooming Documentation:  Questionnaire(s) not done per department protocol    Reason for visit: ERNIE PICKARD

## 2025-06-30 ENCOUNTER — MYC REFILL (OUTPATIENT)
Dept: GASTROENTEROLOGY | Facility: CLINIC | Age: 40
End: 2025-06-30
Payer: COMMERCIAL

## 2025-06-30 DIAGNOSIS — R11.2 NAUSEA AND VOMITING, UNSPECIFIED VOMITING TYPE: ICD-10-CM

## 2025-07-01 ENCOUNTER — PREP FOR PROCEDURE (OUTPATIENT)
Dept: GASTROENTEROLOGY | Facility: CLINIC | Age: 40
End: 2025-07-01
Payer: COMMERCIAL

## 2025-07-01 DIAGNOSIS — K56.699 COLON STRICTURE (H): Primary | ICD-10-CM

## 2025-07-01 DIAGNOSIS — K50.90 CROHN'S DISEASE (H): ICD-10-CM

## 2025-07-08 RX ORDER — METOCLOPRAMIDE 10 MG/1
10 TABLET ORAL 2 TIMES DAILY PRN
Qty: 30 TABLET | Refills: 3 | Status: SHIPPED | OUTPATIENT
Start: 2025-07-08 | End: 2025-07-09

## 2025-07-09 RX ORDER — METOCLOPRAMIDE 10 MG/1
10 TABLET ORAL 2 TIMES DAILY PRN
Qty: 60 TABLET | Refills: 3 | Status: SHIPPED | OUTPATIENT
Start: 2025-07-09

## 2025-08-16 DIAGNOSIS — R10.84 GENERALIZED ABDOMINAL PAIN: ICD-10-CM

## 2025-08-19 ENCOUNTER — INFUSION THERAPY VISIT (OUTPATIENT)
Dept: INFUSION THERAPY | Facility: CLINIC | Age: 40
End: 2025-08-19
Attending: INTERNAL MEDICINE
Payer: COMMERCIAL

## 2025-08-19 VITALS
DIASTOLIC BLOOD PRESSURE: 77 MMHG | SYSTOLIC BLOOD PRESSURE: 111 MMHG | HEART RATE: 73 BPM | OXYGEN SATURATION: 99 % | TEMPERATURE: 97.7 F

## 2025-08-19 DIAGNOSIS — D50.9 ANEMIA, IRON DEFICIENCY: Primary | ICD-10-CM

## 2025-08-19 DIAGNOSIS — R10.84 GENERALIZED ABDOMINAL PAIN: ICD-10-CM

## 2025-08-19 PROCEDURE — 258N000003 HC RX IP 258 OP 636: Performed by: INTERNAL MEDICINE

## 2025-08-19 PROCEDURE — 96374 THER/PROPH/DIAG INJ IV PUSH: CPT

## 2025-08-19 PROCEDURE — 250N000011 HC RX IP 250 OP 636: Mod: JZ | Performed by: INTERNAL MEDICINE

## 2025-08-19 PROCEDURE — 96375 TX/PRO/DX INJ NEW DRUG ADDON: CPT

## 2025-08-19 RX ORDER — DICYCLOMINE HYDROCHLORIDE 10 MG/1
10 CAPSULE ORAL 4 TIMES DAILY PRN
Qty: 240 CAPSULE | Refills: 0 | Status: SHIPPED | OUTPATIENT
Start: 2025-08-19

## 2025-08-19 RX ORDER — ALBUTEROL SULFATE 0.83 MG/ML
2.5 SOLUTION RESPIRATORY (INHALATION)
OUTPATIENT
Start: 2025-08-21

## 2025-08-19 RX ORDER — METHYLPREDNISOLONE SODIUM SUCCINATE 40 MG/ML
40 INJECTION INTRAMUSCULAR; INTRAVENOUS
Start: 2025-08-21

## 2025-08-19 RX ORDER — HEPARIN SODIUM,PORCINE 10 UNIT/ML
5-20 VIAL (ML) INTRAVENOUS DAILY PRN
OUTPATIENT
Start: 2025-08-21

## 2025-08-19 RX ORDER — METHYLPREDNISOLONE SODIUM SUCCINATE 125 MG/2ML
125 INJECTION INTRAMUSCULAR; INTRAVENOUS ONCE
Start: 2025-08-21 | End: 2025-08-21

## 2025-08-19 RX ORDER — DICYCLOMINE HYDROCHLORIDE 10 MG/1
10 CAPSULE ORAL 4 TIMES DAILY PRN
Qty: 240 CAPSULE | Refills: 0 | OUTPATIENT
Start: 2025-08-19

## 2025-08-19 RX ORDER — DIPHENHYDRAMINE HYDROCHLORIDE 50 MG/ML
50 INJECTION, SOLUTION INTRAMUSCULAR; INTRAVENOUS
Start: 2025-08-21

## 2025-08-19 RX ORDER — MEPERIDINE HYDROCHLORIDE 25 MG/ML
25 INJECTION INTRAMUSCULAR; INTRAVENOUS; SUBCUTANEOUS
OUTPATIENT
Start: 2025-08-21

## 2025-08-19 RX ORDER — HEPARIN SODIUM (PORCINE) LOCK FLUSH IV SOLN 100 UNIT/ML 100 UNIT/ML
5 SOLUTION INTRAVENOUS
OUTPATIENT
Start: 2025-08-21

## 2025-08-19 RX ORDER — ALBUTEROL SULFATE 90 UG/1
1-2 INHALANT RESPIRATORY (INHALATION)
Start: 2025-08-21

## 2025-08-19 RX ORDER — EPINEPHRINE 1 MG/ML
0.3 INJECTION, SOLUTION INTRAMUSCULAR; SUBCUTANEOUS EVERY 5 MIN PRN
OUTPATIENT
Start: 2025-08-21

## 2025-08-19 RX ORDER — METHYLPREDNISOLONE SODIUM SUCCINATE 125 MG/2ML
125 INJECTION INTRAMUSCULAR; INTRAVENOUS
Status: COMPLETED | OUTPATIENT
Start: 2025-08-19 | End: 2025-08-19

## 2025-08-19 RX ORDER — METHYLPREDNISOLONE SODIUM SUCCINATE 125 MG/2ML
125 INJECTION INTRAMUSCULAR; INTRAVENOUS ONCE
Status: CANCELLED
Start: 2025-08-19 | End: 2025-08-19

## 2025-08-19 RX ORDER — DIPHENHYDRAMINE HYDROCHLORIDE 50 MG/ML
25 INJECTION, SOLUTION INTRAMUSCULAR; INTRAVENOUS
Start: 2025-08-21

## 2025-08-19 RX ADMIN — SODIUM CHLORIDE 250 ML: 0.9 INJECTION, SOLUTION INTRAVENOUS at 15:45

## 2025-08-19 RX ADMIN — METHYLPREDNISOLONE SODIUM SUCCINATE 125 MG: 125 INJECTION, POWDER, FOR SOLUTION INTRAMUSCULAR; INTRAVENOUS at 15:48

## 2025-08-19 RX ADMIN — FAMOTIDINE 20 MG: 10 INJECTION INTRAVENOUS at 15:45

## 2025-08-19 RX ADMIN — IRON SUCROSE 200 MG: 20 INJECTION, SOLUTION INTRAVENOUS at 16:01

## 2025-08-25 ENCOUNTER — INFUSION THERAPY VISIT (OUTPATIENT)
Dept: INFUSION THERAPY | Facility: CLINIC | Age: 40
End: 2025-08-25
Attending: INTERNAL MEDICINE
Payer: COMMERCIAL

## 2025-08-25 VITALS
OXYGEN SATURATION: 97 % | RESPIRATION RATE: 16 BRPM | DIASTOLIC BLOOD PRESSURE: 58 MMHG | HEART RATE: 64 BPM | TEMPERATURE: 98.6 F | SYSTOLIC BLOOD PRESSURE: 97 MMHG

## 2025-08-25 DIAGNOSIS — D50.9 IRON DEFICIENCY ANEMIA, UNSPECIFIED IRON DEFICIENCY ANEMIA TYPE: Primary | ICD-10-CM

## 2025-08-25 PROCEDURE — 250N000011 HC RX IP 250 OP 636: Performed by: INTERNAL MEDICINE

## 2025-08-25 PROCEDURE — 258N000003 HC RX IP 258 OP 636: Performed by: INTERNAL MEDICINE

## 2025-08-25 PROCEDURE — 96375 TX/PRO/DX INJ NEW DRUG ADDON: CPT

## 2025-08-25 PROCEDURE — 96374 THER/PROPH/DIAG INJ IV PUSH: CPT

## 2025-08-25 RX ORDER — ALBUTEROL SULFATE 0.83 MG/ML
2.5 SOLUTION RESPIRATORY (INHALATION)
OUTPATIENT
Start: 2025-08-27

## 2025-08-25 RX ORDER — MEPERIDINE HYDROCHLORIDE 25 MG/ML
25 INJECTION INTRAMUSCULAR; INTRAVENOUS; SUBCUTANEOUS
OUTPATIENT
Start: 2025-08-27

## 2025-08-25 RX ORDER — HEPARIN SODIUM,PORCINE 10 UNIT/ML
5-20 VIAL (ML) INTRAVENOUS DAILY PRN
OUTPATIENT
Start: 2025-08-27

## 2025-08-25 RX ORDER — METHYLPREDNISOLONE SODIUM SUCCINATE 125 MG/2ML
125 INJECTION INTRAMUSCULAR; INTRAVENOUS ONCE
Status: CANCELLED
Start: 2025-08-27 | End: 2025-08-27

## 2025-08-25 RX ORDER — HEPARIN SODIUM (PORCINE) LOCK FLUSH IV SOLN 100 UNIT/ML 100 UNIT/ML
5 SOLUTION INTRAVENOUS
OUTPATIENT
Start: 2025-08-27

## 2025-08-25 RX ORDER — EPINEPHRINE 1 MG/ML
0.3 INJECTION, SOLUTION INTRAMUSCULAR; SUBCUTANEOUS EVERY 5 MIN PRN
OUTPATIENT
Start: 2025-08-27

## 2025-08-25 RX ORDER — DIPHENHYDRAMINE HYDROCHLORIDE 50 MG/ML
50 INJECTION, SOLUTION INTRAMUSCULAR; INTRAVENOUS
Start: 2025-08-27

## 2025-08-25 RX ORDER — METHYLPREDNISOLONE SODIUM SUCCINATE 40 MG/ML
40 INJECTION INTRAMUSCULAR; INTRAVENOUS
Start: 2025-08-27

## 2025-08-25 RX ORDER — METHYLPREDNISOLONE SODIUM SUCCINATE 125 MG/2ML
125 INJECTION INTRAMUSCULAR; INTRAVENOUS ONCE
Status: COMPLETED | OUTPATIENT
Start: 2025-08-25 | End: 2025-08-25

## 2025-08-25 RX ORDER — ALBUTEROL SULFATE 90 UG/1
1-2 INHALANT RESPIRATORY (INHALATION)
Start: 2025-08-27

## 2025-08-25 RX ORDER — DIPHENHYDRAMINE HYDROCHLORIDE 50 MG/ML
25 INJECTION, SOLUTION INTRAMUSCULAR; INTRAVENOUS
Start: 2025-08-27

## 2025-08-25 RX ADMIN — FAMOTIDINE 20 MG: 10 INJECTION INTRAVENOUS at 15:32

## 2025-08-25 RX ADMIN — SODIUM CHLORIDE 250 ML: 0.9 INJECTION, SOLUTION INTRAVENOUS at 15:30

## 2025-08-25 RX ADMIN — IRON SUCROSE 200 MG: 20 INJECTION, SOLUTION INTRAVENOUS at 15:55

## 2025-08-25 RX ADMIN — METHYLPREDNISOLONE SODIUM SUCCINATE 125 MG: 125 INJECTION, POWDER, FOR SOLUTION INTRAMUSCULAR; INTRAVENOUS at 15:35

## 2025-08-27 ENCOUNTER — INFUSION THERAPY VISIT (OUTPATIENT)
Dept: INFUSION THERAPY | Facility: CLINIC | Age: 40
End: 2025-08-27
Attending: INTERNAL MEDICINE
Payer: COMMERCIAL

## 2025-08-28 ENCOUNTER — ANESTHESIA EVENT (OUTPATIENT)
Dept: SURGERY | Facility: CLINIC | Age: 40
End: 2025-08-28
Payer: COMMERCIAL

## 2025-08-28 ENCOUNTER — HOSPITAL ENCOUNTER (OUTPATIENT)
Facility: CLINIC | Age: 40
Discharge: HOME | End: 2025-08-28
Attending: INTERNAL MEDICINE | Admitting: INTERNAL MEDICINE
Payer: COMMERCIAL

## 2025-08-28 ENCOUNTER — ANESTHESIA (OUTPATIENT)
Dept: SURGERY | Facility: CLINIC | Age: 40
End: 2025-08-28
Payer: COMMERCIAL

## 2025-08-28 VITALS
RESPIRATION RATE: 12 BRPM | WEIGHT: 109.4 LBS | BODY MASS INDEX: 17.17 KG/M2 | OXYGEN SATURATION: 100 % | HEART RATE: 60 BPM | SYSTOLIC BLOOD PRESSURE: 100 MMHG | DIASTOLIC BLOOD PRESSURE: 64 MMHG | TEMPERATURE: 97.2 F | HEIGHT: 67 IN

## 2025-08-28 LAB — POUCHOSCOPY: NORMAL

## 2025-08-28 PROCEDURE — 999N000141 HC STATISTIC PRE-PROCEDURE NURSING ASSESSMENT: Performed by: INTERNAL MEDICINE

## 2025-08-28 PROCEDURE — 370N000017 HC ANESTHESIA TECHNICAL FEE, PER MIN: Performed by: INTERNAL MEDICINE

## 2025-08-28 PROCEDURE — 250N000009 HC RX 250

## 2025-08-28 PROCEDURE — 250N000011 HC RX IP 250 OP 636

## 2025-08-28 PROCEDURE — 88305 TISSUE EXAM BY PATHOLOGIST: CPT | Mod: TC | Performed by: INTERNAL MEDICINE

## 2025-08-28 PROCEDURE — 360N000082 HC SURGERY LEVEL 2 W/ FLUORO, PER MIN: Performed by: INTERNAL MEDICINE

## 2025-08-28 PROCEDURE — 258N000003 HC RX IP 258 OP 636

## 2025-08-28 PROCEDURE — 272N000001 HC OR GENERAL SUPPLY STERILE: Performed by: INTERNAL MEDICINE

## 2025-08-28 PROCEDURE — 710N000009 HC RECOVERY PHASE 1, LEVEL 1, PER MIN: Performed by: INTERNAL MEDICINE

## 2025-08-28 PROCEDURE — 710N000012 HC RECOVERY PHASE 2, PER MINUTE: Performed by: INTERNAL MEDICINE

## 2025-08-28 DEVICE — STENT ESU AXIOS W/DEL SYS 20X10MM 10.8FR 138CM M00553660: Type: IMPLANTABLE DEVICE | Site: RECTUM | Status: FUNCTIONAL

## 2025-08-28 RX ORDER — LABETALOL HYDROCHLORIDE 5 MG/ML
10 INJECTION, SOLUTION INTRAVENOUS
Status: DISCONTINUED | OUTPATIENT
Start: 2025-08-28 | End: 2025-08-28 | Stop reason: HOSPADM

## 2025-08-28 RX ORDER — NALOXONE HYDROCHLORIDE 0.4 MG/ML
0.2 INJECTION, SOLUTION INTRAMUSCULAR; INTRAVENOUS; SUBCUTANEOUS
Status: DISCONTINUED | OUTPATIENT
Start: 2025-08-28 | End: 2025-08-28 | Stop reason: HOSPADM

## 2025-08-28 RX ORDER — ONDANSETRON 2 MG/ML
4 INJECTION INTRAMUSCULAR; INTRAVENOUS
Status: COMPLETED | OUTPATIENT
Start: 2025-08-28 | End: 2025-08-28

## 2025-08-28 RX ORDER — ONDANSETRON 4 MG/1
4 TABLET, ORALLY DISINTEGRATING ORAL EVERY 6 HOURS PRN
Status: DISCONTINUED | OUTPATIENT
Start: 2025-08-28 | End: 2025-08-28 | Stop reason: HOSPADM

## 2025-08-28 RX ORDER — SODIUM CHLORIDE, SODIUM LACTATE, POTASSIUM CHLORIDE, CALCIUM CHLORIDE 600; 310; 30; 20 MG/100ML; MG/100ML; MG/100ML; MG/100ML
INJECTION, SOLUTION INTRAVENOUS CONTINUOUS PRN
Status: DISCONTINUED | OUTPATIENT
Start: 2025-08-28 | End: 2025-08-28

## 2025-08-28 RX ORDER — HYDROXYZINE HYDROCHLORIDE 25 MG/1
25 TABLET, FILM COATED ORAL EVERY 6 HOURS PRN
Status: DISCONTINUED | OUTPATIENT
Start: 2025-08-28 | End: 2025-08-28 | Stop reason: HOSPADM

## 2025-08-28 RX ORDER — ACETAMINOPHEN 325 MG/1
975 TABLET ORAL ONCE
Status: DISCONTINUED | OUTPATIENT
Start: 2025-08-28 | End: 2025-08-28 | Stop reason: HOSPADM

## 2025-08-28 RX ORDER — HYDROMORPHONE HCL IN WATER/PF 6 MG/30 ML
0.4 PATIENT CONTROLLED ANALGESIA SYRINGE INTRAVENOUS EVERY 5 MIN PRN
Status: DISCONTINUED | OUTPATIENT
Start: 2025-08-28 | End: 2025-08-28 | Stop reason: HOSPADM

## 2025-08-28 RX ORDER — ONDANSETRON 2 MG/ML
4 INJECTION INTRAMUSCULAR; INTRAVENOUS EVERY 6 HOURS PRN
Status: DISCONTINUED | OUTPATIENT
Start: 2025-08-28 | End: 2025-08-28 | Stop reason: HOSPADM

## 2025-08-28 RX ORDER — NALOXONE HYDROCHLORIDE 0.4 MG/ML
0.4 INJECTION, SOLUTION INTRAMUSCULAR; INTRAVENOUS; SUBCUTANEOUS
Status: DISCONTINUED | OUTPATIENT
Start: 2025-08-28 | End: 2025-08-28 | Stop reason: HOSPADM

## 2025-08-28 RX ORDER — HYDROMORPHONE HCL IN WATER/PF 6 MG/30 ML
0.2 PATIENT CONTROLLED ANALGESIA SYRINGE INTRAVENOUS EVERY 5 MIN PRN
Status: DISCONTINUED | OUTPATIENT
Start: 2025-08-28 | End: 2025-08-28 | Stop reason: HOSPADM

## 2025-08-28 RX ORDER — NALOXONE HYDROCHLORIDE 0.4 MG/ML
0.1 INJECTION, SOLUTION INTRAMUSCULAR; INTRAVENOUS; SUBCUTANEOUS
Status: DISCONTINUED | OUTPATIENT
Start: 2025-08-28 | End: 2025-08-28 | Stop reason: HOSPADM

## 2025-08-28 RX ORDER — HYDRALAZINE HYDROCHLORIDE 20 MG/ML
2.5-5 INJECTION INTRAMUSCULAR; INTRAVENOUS EVERY 10 MIN PRN
Status: DISCONTINUED | OUTPATIENT
Start: 2025-08-28 | End: 2025-08-28 | Stop reason: HOSPADM

## 2025-08-28 RX ORDER — ONDANSETRON 4 MG/1
4 TABLET, ORALLY DISINTEGRATING ORAL EVERY 30 MIN PRN
Status: DISCONTINUED | OUTPATIENT
Start: 2025-08-28 | End: 2025-08-28 | Stop reason: HOSPADM

## 2025-08-28 RX ORDER — LIDOCAINE HYDROCHLORIDE 20 MG/ML
INJECTION, SOLUTION INFILTRATION; PERINEURAL PRN
Status: DISCONTINUED | OUTPATIENT
Start: 2025-08-28 | End: 2025-08-28

## 2025-08-28 RX ORDER — FENTANYL CITRATE 0.05 MG/ML
50 INJECTION, SOLUTION INTRAMUSCULAR; INTRAVENOUS EVERY 5 MIN PRN
Status: DISCONTINUED | OUTPATIENT
Start: 2025-08-28 | End: 2025-08-28 | Stop reason: HOSPADM

## 2025-08-28 RX ORDER — SODIUM CHLORIDE, SODIUM LACTATE, POTASSIUM CHLORIDE, CALCIUM CHLORIDE 600; 310; 30; 20 MG/100ML; MG/100ML; MG/100ML; MG/100ML
INJECTION, SOLUTION INTRAVENOUS CONTINUOUS
Status: DISCONTINUED | OUTPATIENT
Start: 2025-08-28 | End: 2025-08-28 | Stop reason: HOSPADM

## 2025-08-28 RX ORDER — PROPOFOL 10 MG/ML
INJECTION, EMULSION INTRAVENOUS CONTINUOUS PRN
Status: DISCONTINUED | OUTPATIENT
Start: 2025-08-28 | End: 2025-08-28

## 2025-08-28 RX ORDER — FLUMAZENIL 0.1 MG/ML
0.2 INJECTION, SOLUTION INTRAVENOUS
Status: DISCONTINUED | OUTPATIENT
Start: 2025-08-28 | End: 2025-08-28 | Stop reason: HOSPADM

## 2025-08-28 RX ORDER — FENTANYL CITRATE 0.05 MG/ML
25 INJECTION, SOLUTION INTRAMUSCULAR; INTRAVENOUS EVERY 5 MIN PRN
Status: DISCONTINUED | OUTPATIENT
Start: 2025-08-28 | End: 2025-08-28 | Stop reason: HOSPADM

## 2025-08-28 RX ORDER — LIDOCAINE 40 MG/G
CREAM TOPICAL
Status: DISCONTINUED | OUTPATIENT
Start: 2025-08-28 | End: 2025-08-28 | Stop reason: HOSPADM

## 2025-08-28 RX ORDER — ONDANSETRON 2 MG/ML
INJECTION INTRAMUSCULAR; INTRAVENOUS PRN
Status: DISCONTINUED | OUTPATIENT
Start: 2025-08-28 | End: 2025-08-28

## 2025-08-28 RX ORDER — PROCHLORPERAZINE MALEATE 10 MG
10 TABLET ORAL EVERY 6 HOURS PRN
Status: DISCONTINUED | OUTPATIENT
Start: 2025-08-28 | End: 2025-08-28 | Stop reason: HOSPADM

## 2025-08-28 RX ORDER — ONDANSETRON 2 MG/ML
4 INJECTION INTRAMUSCULAR; INTRAVENOUS EVERY 30 MIN PRN
Status: DISCONTINUED | OUTPATIENT
Start: 2025-08-28 | End: 2025-08-28 | Stop reason: HOSPADM

## 2025-08-28 RX ORDER — PROPOFOL 10 MG/ML
INJECTION, EMULSION INTRAVENOUS PRN
Status: DISCONTINUED | OUTPATIENT
Start: 2025-08-28 | End: 2025-08-28

## 2025-08-28 RX ORDER — DEXAMETHASONE SODIUM PHOSPHATE 4 MG/ML
4 INJECTION, SOLUTION INTRA-ARTICULAR; INTRALESIONAL; INTRAMUSCULAR; INTRAVENOUS; SOFT TISSUE
Status: DISCONTINUED | OUTPATIENT
Start: 2025-08-28 | End: 2025-08-28 | Stop reason: HOSPADM

## 2025-08-28 RX ADMIN — PROPOFOL 200 MCG/KG/MIN: 10 INJECTION, EMULSION INTRAVENOUS at 09:09

## 2025-08-28 RX ADMIN — PROPOFOL 110 MG: 10 INJECTION, EMULSION INTRAVENOUS at 09:09

## 2025-08-28 RX ADMIN — ONDANSETRON 4 MG: 2 INJECTION INTRAMUSCULAR; INTRAVENOUS at 09:37

## 2025-08-28 RX ADMIN — PHENYLEPHRINE HYDROCHLORIDE 50 MCG: 10 INJECTION INTRAVENOUS at 09:22

## 2025-08-28 RX ADMIN — SODIUM CHLORIDE, SODIUM LACTATE, POTASSIUM CHLORIDE, AND CALCIUM CHLORIDE: .6; .31; .03; .02 INJECTION, SOLUTION INTRAVENOUS at 09:03

## 2025-08-28 RX ADMIN — PHENYLEPHRINE HYDROCHLORIDE 50 MCG: 10 INJECTION INTRAVENOUS at 09:33

## 2025-08-28 RX ADMIN — LIDOCAINE HYDROCHLORIDE 100 MG: 20 INJECTION, SOLUTION INFILTRATION; PERINEURAL at 09:09

## 2025-08-28 RX ADMIN — PHENYLEPHRINE HYDROCHLORIDE 50 MCG: 10 INJECTION INTRAVENOUS at 09:31

## 2025-08-28 RX ADMIN — PROPOFOL 20 MG: 10 INJECTION, EMULSION INTRAVENOUS at 09:11

## 2025-08-28 RX ADMIN — ONDANSETRON 4 MG: 2 INJECTION, SOLUTION INTRAMUSCULAR; INTRAVENOUS at 07:37

## 2025-08-28 RX ADMIN — PHENYLEPHRINE HYDROCHLORIDE 50 MCG: 10 INJECTION INTRAVENOUS at 09:25

## 2025-08-28 ASSESSMENT — ACTIVITIES OF DAILY LIVING (ADL)
ADLS_ACUITY_SCORE: 41

## 2025-08-28 ASSESSMENT — LIFESTYLE VARIABLES: TOBACCO_USE: 1

## 2025-08-29 LAB
PATH REPORT.COMMENTS IMP SPEC: NORMAL
PATH REPORT.COMMENTS IMP SPEC: NORMAL
PATH REPORT.FINAL DX SPEC: NORMAL
PATH REPORT.GROSS SPEC: NORMAL
PATH REPORT.MICROSCOPIC SPEC OTHER STN: NORMAL
PATH REPORT.RELEVANT HX SPEC: NORMAL
PHOTO IMAGE: NORMAL

## 2025-09-02 ENCOUNTER — PREP FOR PROCEDURE (OUTPATIENT)
Dept: GASTROENTEROLOGY | Facility: CLINIC | Age: 40
End: 2025-09-02
Payer: COMMERCIAL

## 2025-09-02 ENCOUNTER — TELEPHONE (OUTPATIENT)
Dept: GASTROENTEROLOGY | Facility: CLINIC | Age: 40
End: 2025-09-02
Payer: COMMERCIAL

## 2025-09-02 ENCOUNTER — MYC MEDICAL ADVICE (OUTPATIENT)
Dept: GASTROENTEROLOGY | Facility: CLINIC | Age: 40
End: 2025-09-02
Payer: COMMERCIAL

## 2025-09-02 DIAGNOSIS — K56.699 COLON STRICTURE (H): Primary | ICD-10-CM

## 2025-09-02 DIAGNOSIS — K50.90 CROHN'S DISEASE (H): ICD-10-CM

## 2025-09-03 ENCOUNTER — INFUSION THERAPY VISIT (OUTPATIENT)
Dept: INFUSION THERAPY | Facility: CLINIC | Age: 40
End: 2025-09-03
Attending: INTERNAL MEDICINE
Payer: COMMERCIAL

## 2025-09-03 VITALS
HEART RATE: 61 BPM | RESPIRATION RATE: 16 BRPM | SYSTOLIC BLOOD PRESSURE: 102 MMHG | TEMPERATURE: 97 F | OXYGEN SATURATION: 99 % | DIASTOLIC BLOOD PRESSURE: 62 MMHG

## 2025-09-03 DIAGNOSIS — D50.9 IRON DEFICIENCY ANEMIA, UNSPECIFIED IRON DEFICIENCY ANEMIA TYPE: Primary | ICD-10-CM

## 2025-09-03 PROCEDURE — 96375 TX/PRO/DX INJ NEW DRUG ADDON: CPT

## 2025-09-03 PROCEDURE — 96374 THER/PROPH/DIAG INJ IV PUSH: CPT

## 2025-09-03 PROCEDURE — 250N000011 HC RX IP 250 OP 636: Mod: JZ | Performed by: INTERNAL MEDICINE

## 2025-09-03 PROCEDURE — 258N000003 HC RX IP 258 OP 636: Performed by: INTERNAL MEDICINE

## 2025-09-03 RX ORDER — METHYLPREDNISOLONE SODIUM SUCCINATE 125 MG/2ML
125 INJECTION INTRAMUSCULAR; INTRAVENOUS ONCE
Status: COMPLETED | OUTPATIENT
Start: 2025-09-03 | End: 2025-09-03

## 2025-09-03 RX ORDER — EPINEPHRINE 1 MG/ML
0.3 INJECTION, SOLUTION INTRAMUSCULAR; SUBCUTANEOUS EVERY 5 MIN PRN
OUTPATIENT
Start: 2025-09-04

## 2025-09-03 RX ORDER — DIPHENHYDRAMINE HYDROCHLORIDE 50 MG/ML
25 INJECTION INTRAMUSCULAR; INTRAVENOUS
Start: 2025-09-04

## 2025-09-03 RX ORDER — METHYLPREDNISOLONE SODIUM SUCCINATE 40 MG/ML
40 INJECTION INTRAMUSCULAR; INTRAVENOUS
Start: 2025-09-04

## 2025-09-03 RX ORDER — HEPARIN SODIUM,PORCINE 10 UNIT/ML
5-20 VIAL (ML) INTRAVENOUS DAILY PRN
OUTPATIENT
Start: 2025-09-04

## 2025-09-03 RX ORDER — DIPHENHYDRAMINE HYDROCHLORIDE 50 MG/ML
50 INJECTION INTRAMUSCULAR; INTRAVENOUS
Start: 2025-09-04

## 2025-09-03 RX ORDER — HEPARIN SODIUM (PORCINE) LOCK FLUSH IV SOLN 100 UNIT/ML 100 UNIT/ML
5 SOLUTION INTRAVENOUS
OUTPATIENT
Start: 2025-09-04

## 2025-09-03 RX ORDER — ALBUTEROL SULFATE 90 UG/1
1-2 INHALANT RESPIRATORY (INHALATION)
Start: 2025-09-04

## 2025-09-03 RX ORDER — MEPERIDINE HYDROCHLORIDE 25 MG/ML
25 INJECTION INTRAMUSCULAR; INTRAVENOUS; SUBCUTANEOUS
OUTPATIENT
Start: 2025-09-04

## 2025-09-03 RX ORDER — IRON SUCROSE 100 MG/5ML
200 INJECTION, SOLUTION INTRAVENOUS ONCE
Status: COMPLETED | OUTPATIENT
Start: 2025-09-03 | End: 2025-09-03

## 2025-09-03 RX ORDER — ALBUTEROL SULFATE 0.83 MG/ML
2.5 SOLUTION RESPIRATORY (INHALATION)
OUTPATIENT
Start: 2025-09-04

## 2025-09-03 RX ORDER — IRON SUCROSE 100 MG/5ML
200 INJECTION, SOLUTION INTRAVENOUS ONCE
OUTPATIENT
Start: 2025-09-04 | End: 2025-09-04

## 2025-09-03 RX ORDER — METHYLPREDNISOLONE SODIUM SUCCINATE 125 MG/2ML
125 INJECTION INTRAMUSCULAR; INTRAVENOUS ONCE
Start: 2025-09-04 | End: 2025-09-04

## 2025-09-03 RX ADMIN — FAMOTIDINE 20 MG: 10 INJECTION INTRAVENOUS at 15:24

## 2025-09-03 RX ADMIN — SODIUM CHLORIDE 250 ML: 9 INJECTION, SOLUTION INTRAVENOUS at 15:24

## 2025-09-03 RX ADMIN — IRON SUCROSE 200 MG: 20 INJECTION, SOLUTION INTRAVENOUS at 15:43

## 2025-09-03 RX ADMIN — METHYLPREDNISOLONE SODIUM SUCCINATE 125 MG: 125 INJECTION, POWDER, FOR SOLUTION INTRAMUSCULAR; INTRAVENOUS at 15:26

## (undated) DEVICE — ENDO FORCEP SPIKED SERRATED SHAFT JUMBO 239CM G56998

## (undated) RX ORDER — PROPOFOL 10 MG/ML
INJECTION, EMULSION INTRAVENOUS
Status: DISPENSED
Start: 2025-08-28

## (undated) RX ORDER — ONDANSETRON 2 MG/ML
INJECTION INTRAMUSCULAR; INTRAVENOUS
Status: DISPENSED
Start: 2025-08-28